# Patient Record
Sex: FEMALE | Race: WHITE
[De-identification: names, ages, dates, MRNs, and addresses within clinical notes are randomized per-mention and may not be internally consistent; named-entity substitution may affect disease eponyms.]

---

## 2017-02-14 ENCOUNTER — HOSPITAL ENCOUNTER (OUTPATIENT)
Dept: HOSPITAL 22 - LAB | Age: 72
End: 2017-02-14
Attending: INTERNAL MEDICINE
Payer: MEDICARE

## 2017-02-14 DIAGNOSIS — E78.5: ICD-10-CM

## 2017-02-14 DIAGNOSIS — I10: ICD-10-CM

## 2017-02-14 DIAGNOSIS — E87.1: Primary | ICD-10-CM

## 2017-02-14 LAB
BUN: 16 MG/DL (ref 7–18)
GFR SERPLBLD BASED ON 1.73 SQ M-ARVRAT: 71 ML/MIN (ref 59–?)

## 2017-10-21 ENCOUNTER — HOSPITAL ENCOUNTER (OUTPATIENT)
Dept: HOSPITAL 22 - LAB | Age: 72
End: 2017-10-21
Payer: MEDICARE

## 2017-10-21 DIAGNOSIS — E78.5: ICD-10-CM

## 2017-10-21 DIAGNOSIS — I10: Primary | ICD-10-CM

## 2017-10-21 DIAGNOSIS — E87.1: ICD-10-CM

## 2017-10-21 LAB
BUN: 20 MG/DL (ref 7–18)
GFR SERPLBLD BASED ON 1.73 SQ M-ARVRAT: 55 ML/MIN (ref 59–?)

## 2018-03-14 ENCOUNTER — HOSPITAL ENCOUNTER (OUTPATIENT)
Age: 73
End: 2018-03-14
Payer: MEDICARE

## 2018-03-14 DIAGNOSIS — R92.8: Primary | ICD-10-CM

## 2018-03-14 PROCEDURE — 77065 DX MAMMO INCL CAD UNI: CPT

## 2018-04-17 ENCOUNTER — HOSPITAL ENCOUNTER (OUTPATIENT)
Age: 73
End: 2018-04-17
Payer: MEDICARE

## 2018-04-17 DIAGNOSIS — M81.0: Primary | ICD-10-CM

## 2018-04-17 PROCEDURE — 77080 DXA BONE DENSITY AXIAL: CPT

## 2018-05-10 ENCOUNTER — HOSPITAL ENCOUNTER (OUTPATIENT)
Age: 73
End: 2018-05-10
Payer: MEDICARE

## 2018-05-10 DIAGNOSIS — K76.0: Primary | ICD-10-CM

## 2018-05-10 LAB
ANION GAP SERPL CALC-SCNC: 15.6 MEQ/L (ref 5–15)
BUN SERPL-MCNC: 29 MG/DL (ref 7–18)
CHLORIDE SPEC-SCNC: 103 MMOL/L (ref 98–107)
CHOLEST SPEC-SCNC: 165 MG/DL (ref 140–200)
CO2 SERPL-SCNC: 25 MMOL/L (ref 21–32)
CREAT BLD-SCNC: 1.42 MG/DL (ref 0.55–1.02)
ESTIMATED GLOMERULAR FILT RATE: 36 ML/MIN (ref 60–?)
GFR (AFRICAN AMERICAN): 44 ML/MIN (ref 60–?)
GLUCOSE: 96 MG/DL (ref 74–106)
HDLC SERPL-MCNC: 49 MG/DL (ref 29–89)
POTASSIUM: 4.6 MMOL/L (ref 3.5–5.1)
SODIUM SPEC-SCNC: 139 MMOL/L (ref 136–145)
TRIGLYCERIDES: 75 MG/DL (ref 30–200)

## 2018-05-10 PROCEDURE — 80048 BASIC METABOLIC PNL TOTAL CA: CPT

## 2018-05-10 PROCEDURE — 80061 LIPID PANEL: CPT

## 2018-05-10 PROCEDURE — 36415 COLL VENOUS BLD VENIPUNCTURE: CPT

## 2018-06-06 ENCOUNTER — HOSPITAL ENCOUNTER (OUTPATIENT)
Age: 73
End: 2018-06-06
Payer: MEDICARE

## 2018-06-06 DIAGNOSIS — R53.83: ICD-10-CM

## 2018-06-06 DIAGNOSIS — G47.33: Primary | ICD-10-CM

## 2018-06-06 DIAGNOSIS — R06.83: ICD-10-CM

## 2018-06-06 PROCEDURE — G0399 HOME SLEEP TEST/TYPE 3 PORTA: HCPCS

## 2018-07-21 ENCOUNTER — HOSPITAL ENCOUNTER (OUTPATIENT)
Dept: HOSPITAL 22 - LAB | Age: 73
End: 2018-07-21
Payer: MEDICARE

## 2018-07-21 DIAGNOSIS — R35.0: Primary | ICD-10-CM

## 2018-07-21 PROCEDURE — 87088 URINE BACTERIA CULTURE: CPT

## 2018-07-21 PROCEDURE — 87086 URINE CULTURE/COLONY COUNT: CPT

## 2018-07-21 PROCEDURE — 87186 SC STD MICRODIL/AGAR DIL: CPT

## 2018-08-24 ENCOUNTER — HOSPITAL ENCOUNTER (OUTPATIENT)
Age: 73
End: 2018-08-24
Payer: MEDICARE

## 2018-08-24 DIAGNOSIS — R92.8: Primary | ICD-10-CM

## 2018-08-24 PROCEDURE — 77066 DX MAMMO INCL CAD BI: CPT

## 2019-05-03 ENCOUNTER — HOSPITAL ENCOUNTER (OUTPATIENT)
Age: 74
End: 2019-05-03
Payer: MEDICARE

## 2019-05-03 DIAGNOSIS — Z13.820: Primary | ICD-10-CM

## 2019-05-03 DIAGNOSIS — M81.0: ICD-10-CM

## 2019-05-03 PROCEDURE — 77080 DXA BONE DENSITY AXIAL: CPT

## 2019-09-25 ENCOUNTER — HOSPITAL ENCOUNTER (OUTPATIENT)
Age: 74
End: 2019-09-25
Payer: MEDICARE

## 2019-09-25 DIAGNOSIS — Z12.31: Primary | ICD-10-CM

## 2019-09-25 PROCEDURE — 77067 SCR MAMMO BI INCL CAD: CPT

## 2019-11-08 ENCOUNTER — HOSPITAL ENCOUNTER (OUTPATIENT)
Age: 74
End: 2019-11-08
Payer: MEDICARE

## 2019-11-08 DIAGNOSIS — K74.3: Primary | ICD-10-CM

## 2019-11-08 PROCEDURE — 76705 ECHO EXAM OF ABDOMEN: CPT

## 2019-12-02 ENCOUNTER — HOSPITAL ENCOUNTER (OUTPATIENT)
Age: 74
End: 2019-12-02
Payer: MEDICARE

## 2019-12-02 DIAGNOSIS — D50.9: Primary | ICD-10-CM

## 2019-12-02 PROCEDURE — 74250 X-RAY XM SM INT 1CNTRST STD: CPT

## 2020-02-07 ENCOUNTER — HOSPITAL ENCOUNTER (OUTPATIENT)
Age: 75
End: 2020-02-07
Payer: MEDICARE

## 2020-02-07 DIAGNOSIS — D50.9: Primary | ICD-10-CM

## 2020-02-07 LAB
ALBUMIN LEVEL: 3.7 GM/DL (ref 3.4–5)
ALBUMIN/GLOB SERPL: 1 {RATIO} (ref 1.1–1.8)
ALP ISO SERPL-ACNC: 103 U/L (ref 46–116)
ALT SERPLBLD-CCNC: 31 U/L (ref 12–78)
ANION GAP SERPL CALC-SCNC: 11.1 MEQ/L (ref 5–15)
AST SERPL QL: 22 U/L (ref 15–37)
BILIRUBIN,TOTAL: 0.7 MG/DL (ref 0.2–1)
BUN SERPL-MCNC: 15 MG/DL (ref 7–18)
CALCIUM SPEC-MCNC: 9 MG/DL (ref 8.5–10.1)
CHLORIDE SPEC-SCNC: 99 MMOL/L (ref 98–107)
CO2 SERPL-SCNC: 29 MMOL/L (ref 21–32)
CREAT BLD-SCNC: 1.02 MG/DL (ref 0.55–1.02)
ESTIMATED GLOMERULAR FILT RATE: 53 ML/MIN (ref 60–?)
FERRITIN SERPL-MCNC: 16 NG/ML (ref 8–388)
GFR (AFRICAN AMERICAN): 64 ML/MIN (ref 60–?)
GLOBULIN SER CALC-MCNC: 3.8 GM/DL (ref 1.3–3.2)
GLUCOSE: 98 MG/DL (ref 74–106)
HCT VFR BLD CALC: 30.4 % (ref 37–47)
HGB BLD-MCNC: 9.4 G/DL (ref 12.2–16.2)
MCHC RBC-ENTMCNC: 30.9 G/DL (ref 31.8–35.4)
MCV RBC: 73.9 FL (ref 81–99)
MEAN CORPUSCULAR HEMOGLOBIN: 22.8 PG (ref 27–31.2)
PLATELET # BLD: 278 K/MM3 (ref 142–424)
POTASSIUM: 4.1 MMOL/L (ref 3.5–5.1)
PROT SERPL-MCNC: 7.5 GM/DL (ref 6.4–8.2)
RBC # BLD AUTO: 4.11 M/MM3 (ref 4.2–5.4)
RETICS/RBC NFR AUTO: 1.4 % (ref 0.9–3.2)
SODIUM SPEC-SCNC: 135 MMOL/L (ref 136–145)
WBC # BLD AUTO: 6.9 K/MM3 (ref 4.8–10.8)

## 2020-02-07 PROCEDURE — 80053 COMPREHEN METABOLIC PANEL: CPT

## 2020-02-07 PROCEDURE — 83540 ASSAY OF IRON: CPT

## 2020-02-07 PROCEDURE — 82272 OCCULT BLD FECES 1-3 TESTS: CPT

## 2020-02-07 PROCEDURE — 36415 COLL VENOUS BLD VENIPUNCTURE: CPT

## 2020-02-07 PROCEDURE — 82607 VITAMIN B-12: CPT

## 2020-02-07 PROCEDURE — 82746 ASSAY OF FOLIC ACID SERUM: CPT

## 2020-02-07 PROCEDURE — 82728 ASSAY OF FERRITIN: CPT

## 2020-02-07 PROCEDURE — 85044 MANUAL RETICULOCYTE COUNT: CPT

## 2020-02-07 PROCEDURE — G0328 FECAL BLOOD SCRN IMMUNOASSAY: HCPCS

## 2020-02-07 PROCEDURE — 85025 COMPLETE CBC W/AUTO DIFF WBC: CPT

## 2020-02-07 PROCEDURE — 83550 IRON BINDING TEST: CPT

## 2020-02-08 LAB
FOLATE: 15.5 NG/ML (ref 3–?)
IRON: 41 UG/DL (ref 27–139)
UIBC: 380 UG/DL (ref 118–369)
VITAMIN B12: 605 PG/ML (ref 232–1245)

## 2020-03-05 ENCOUNTER — HOSPITAL ENCOUNTER (OUTPATIENT)
Age: 75
End: 2020-03-05
Payer: MEDICARE

## 2020-03-05 DIAGNOSIS — D50.9: Primary | ICD-10-CM

## 2020-03-05 LAB
HCT VFR BLD CALC: 32.1 % (ref 37–47)
HGB BLD-MCNC: 10.6 G/DL (ref 12.2–16.2)
MCHC RBC-ENTMCNC: 33 G/DL (ref 31.8–35.4)
MCV RBC: 77.7 FL (ref 81–99)
MEAN CORPUSCULAR HEMOGLOBIN: 25.6 PG (ref 27–31.2)
PLATELET # BLD: 154 K/MM3 (ref 142–424)
RBC # BLD AUTO: 4.14 M/MM3 (ref 4.2–5.4)
WBC # BLD AUTO: 6 K/MM3 (ref 4.8–10.8)

## 2020-03-05 PROCEDURE — 83615 LACTATE (LD) (LDH) ENZYME: CPT

## 2020-03-05 PROCEDURE — 36415 COLL VENOUS BLD VENIPUNCTURE: CPT

## 2020-03-05 PROCEDURE — 85025 COMPLETE CBC W/AUTO DIFF WBC: CPT

## 2020-03-05 PROCEDURE — 83010 ASSAY OF HAPTOGLOBIN QUANT: CPT

## 2020-06-11 ENCOUNTER — HOSPITAL ENCOUNTER (OUTPATIENT)
Age: 75
End: 2020-06-11
Payer: MEDICARE

## 2020-06-11 DIAGNOSIS — D50.9: Primary | ICD-10-CM

## 2020-06-11 LAB
FERRITIN SERPL-MCNC: 50.4 NG/ML (ref 11.1–264)
HCT VFR BLD CALC: 35.7 % (ref 37–47)
HGB BLD-MCNC: 11.8 G/DL (ref 12.2–16.2)
IRON SERPL QL: 91 UG/DL (ref 37–170)
MCHC RBC-ENTMCNC: 33 G/DL (ref 31.8–35.4)
MCV RBC: 88 FL (ref 81–99)
MEAN CORPUSCULAR HEMOGLOBIN: 29.1 PG (ref 27–31.2)
PLATELET # BLD: 152 K/MM3 (ref 142–424)
RBC # BLD AUTO: 4.06 M/MM3 (ref 4.2–5.4)
TOTAL IRON BINDING CAPACITY: 339 UG/DL (ref 265–497)
WBC # BLD AUTO: 6.5 K/MM3 (ref 4.8–10.8)

## 2020-06-11 PROCEDURE — 83540 ASSAY OF IRON: CPT

## 2020-06-11 PROCEDURE — 82728 ASSAY OF FERRITIN: CPT

## 2020-06-11 PROCEDURE — 83550 IRON BINDING TEST: CPT

## 2020-06-11 PROCEDURE — 85025 COMPLETE CBC W/AUTO DIFF WBC: CPT

## 2020-06-11 PROCEDURE — 36415 COLL VENOUS BLD VENIPUNCTURE: CPT

## 2020-09-28 ENCOUNTER — HOSPITAL ENCOUNTER (OUTPATIENT)
Age: 75
End: 2020-09-28
Payer: MEDICARE

## 2020-09-28 DIAGNOSIS — Z12.31: Primary | ICD-10-CM

## 2020-09-28 PROCEDURE — 77067 SCR MAMMO BI INCL CAD: CPT

## 2020-09-28 PROCEDURE — 77063 BREAST TOMOSYNTHESIS BI: CPT

## 2021-01-15 ENCOUNTER — HOSPITAL ENCOUNTER (OUTPATIENT)
Age: 76
End: 2021-01-15
Payer: MEDICARE

## 2021-01-15 DIAGNOSIS — D50.9: Primary | ICD-10-CM

## 2021-01-15 LAB
FERRITIN SERPL-MCNC: 71.8 NG/ML (ref 11.1–264)
HCT VFR BLD CALC: 38.6 % (ref 37–47)
HGB BLD-MCNC: 13.2 G/DL (ref 12.2–16.2)
IRON SERPL QL: 107 UG/DL (ref 37–170)
MCHC RBC-ENTMCNC: 34.2 G/DL (ref 31.8–35.4)
MCV RBC: 90 FL (ref 81–99)
MEAN CORPUSCULAR HEMOGLOBIN: 30.8 PG (ref 27–31.2)
PLATELET # BLD: 153 K/MM3 (ref 142–424)
RBC # BLD AUTO: 4.28 M/MM3 (ref 4.2–5.4)
TOTAL IRON BINDING CAPACITY: 320 UG/DL (ref 265–497)
WBC # BLD AUTO: 7 K/MM3 (ref 4.8–10.8)

## 2021-01-15 PROCEDURE — 82728 ASSAY OF FERRITIN: CPT

## 2021-01-15 PROCEDURE — 83540 ASSAY OF IRON: CPT

## 2021-01-15 PROCEDURE — 85025 COMPLETE CBC W/AUTO DIFF WBC: CPT

## 2021-01-15 PROCEDURE — 83550 IRON BINDING TEST: CPT

## 2021-01-15 PROCEDURE — 36415 COLL VENOUS BLD VENIPUNCTURE: CPT

## 2021-03-02 ENCOUNTER — HOSPITAL ENCOUNTER (OUTPATIENT)
Age: 76
End: 2021-03-02
Payer: MEDICARE

## 2021-03-02 DIAGNOSIS — Z51.81: ICD-10-CM

## 2021-03-02 DIAGNOSIS — Z01.818: ICD-10-CM

## 2021-03-02 DIAGNOSIS — E78.5: ICD-10-CM

## 2021-03-02 DIAGNOSIS — M25.561: Primary | ICD-10-CM

## 2021-03-02 LAB
ALBUMIN LEVEL: 4.2 G/DL (ref 3.5–5)
ALBUMIN/GLOB SERPL: 1.2 {RATIO} (ref 1.1–1.8)
ALP ISO SERPL-ACNC: 64 U/L (ref 38–126)
ALT SERPLBLD-CCNC: 16 U/L (ref 12–78)
ANION GAP SERPL CALC-SCNC: 11.3 MEQ/L (ref 5–15)
AST SERPL QL: 27 U/L (ref 14–36)
BILIRUBIN,TOTAL: 0.8 MG/DL (ref 0.2–1.3)
BUN SERPL-MCNC: 19 MG/DL (ref 7–17)
CALCIUM SPEC-MCNC: 9.7 MG/DL (ref 8.4–10.2)
CHLORIDE SPEC-SCNC: 100 MMOL/L (ref 98–107)
CHOLEST SPEC-SCNC: 135 MG/DL (ref 140–200)
CO2 SERPL-SCNC: 28 MMOL/L (ref 22–30)
CREAT BLD-SCNC: 1 MG/DL (ref 0.52–1.04)
ESTIMATED GLOMERULAR FILT RATE: 54 ML/MIN (ref 60–?)
GFR (AFRICAN AMERICAN): 66 ML/MIN (ref 60–?)
GLOBULIN SER CALC-MCNC: 3.6 G/DL (ref 1.3–3.2)
GLUCOSE: 143 MG/DL (ref 74–100)
HDLC SERPL-MCNC: 49 MG/DL (ref 40–60)
INR PPP: 0.95 (ref 0.9–1.1)
POTASSIUM: 4.3 MMOL/L (ref 3.5–5.1)
PROT SERPL-MCNC: 7.8 G/DL (ref 6.3–8.2)
PT BLD: 11.3 SECONDS (ref 9.4–11.8)
SODIUM SPEC-SCNC: 135 MMOL/L (ref 136–145)
TRIGLYCERIDES: 90 MG/DL (ref 30–150)

## 2021-03-02 PROCEDURE — 80061 LIPID PANEL: CPT

## 2021-03-02 PROCEDURE — 36415 COLL VENOUS BLD VENIPUNCTURE: CPT

## 2021-03-02 PROCEDURE — 85610 PROTHROMBIN TIME: CPT

## 2021-03-02 PROCEDURE — 80053 COMPREHEN METABOLIC PANEL: CPT

## 2021-03-02 PROCEDURE — 73564 X-RAY EXAM KNEE 4 OR MORE: CPT

## 2021-07-08 ENCOUNTER — HOSPITAL ENCOUNTER (OUTPATIENT)
Age: 76
End: 2021-07-08
Payer: MEDICARE

## 2021-07-08 DIAGNOSIS — D50.9: Primary | ICD-10-CM

## 2021-07-08 LAB
FERRITIN SERPL-MCNC: 134 NG/ML (ref 11.1–264)
HCT VFR BLD CALC: 35.9 % (ref 37–47)
HGB BLD-MCNC: 12.4 G/DL (ref 12.2–16.2)
IRON SERPL QL: 68 UG/DL (ref 37–170)
MCHC RBC-ENTMCNC: 34.5 G/DL (ref 31.8–35.4)
MCV RBC: 86.2 FL (ref 81–99)
MEAN CORPUSCULAR HEMOGLOBIN: 29.8 PG (ref 27–31.2)
PLATELET # BLD: 199 K/MM3 (ref 142–424)
RBC # BLD AUTO: 4.17 M/MM3 (ref 4.2–5.4)
TOTAL IRON BINDING CAPACITY: 289 UG/DL (ref 265–497)
WBC # BLD AUTO: 7.6 K/MM3 (ref 4.8–10.8)

## 2021-07-08 PROCEDURE — 83550 IRON BINDING TEST: CPT

## 2021-07-08 PROCEDURE — 36415 COLL VENOUS BLD VENIPUNCTURE: CPT

## 2021-07-08 PROCEDURE — 82728 ASSAY OF FERRITIN: CPT

## 2021-07-08 PROCEDURE — 85025 COMPLETE CBC W/AUTO DIFF WBC: CPT

## 2021-07-08 PROCEDURE — 83540 ASSAY OF IRON: CPT

## 2021-09-29 ENCOUNTER — HOSPITAL ENCOUNTER (OUTPATIENT)
Age: 76
End: 2021-09-29
Payer: MEDICARE

## 2021-09-29 DIAGNOSIS — Z12.31: Primary | ICD-10-CM

## 2021-09-29 PROCEDURE — 77067 SCR MAMMO BI INCL CAD: CPT

## 2021-09-29 PROCEDURE — 77063 BREAST TOMOSYNTHESIS BI: CPT

## 2021-10-20 ENCOUNTER — HOSPITAL ENCOUNTER (OUTPATIENT)
Age: 76
End: 2021-10-20
Payer: MEDICARE

## 2021-10-20 DIAGNOSIS — R92.8: Primary | ICD-10-CM

## 2021-10-20 PROCEDURE — 77061 BREAST TOMOSYNTHESIS UNI: CPT

## 2021-10-20 PROCEDURE — 77065 DX MAMMO INCL CAD UNI: CPT

## 2021-10-20 PROCEDURE — 76641 ULTRASOUND BREAST COMPLETE: CPT

## 2021-10-20 PROCEDURE — G0279 TOMOSYNTHESIS, MAMMO: HCPCS

## 2021-10-28 ENCOUNTER — HOSPITAL ENCOUNTER (OUTPATIENT)
Age: 76
End: 2021-10-28
Payer: MEDICARE

## 2021-10-28 DIAGNOSIS — K74.3: Primary | ICD-10-CM

## 2021-10-28 LAB
ALBUMIN LEVEL: 4.2 G/DL (ref 3.5–5)
ALBUMIN/GLOB SERPL: 1.3 {RATIO} (ref 1.1–1.8)
ALP ISO SERPL-ACNC: 62 U/L (ref 38–126)
ALT SERPLBLD-CCNC: 21 U/L (ref 12–78)
ANION GAP SERPL CALC-SCNC: 13.4 MEQ/L (ref 5–15)
AST SERPL QL: 27 U/L (ref 14–36)
BILIRUBIN,TOTAL: 0.8 MG/DL (ref 0.2–1.3)
BUN SERPL-MCNC: 13 MG/DL (ref 7–17)
CALCIUM SPEC-MCNC: 9.2 MG/DL (ref 8.4–10.2)
CHLORIDE SPEC-SCNC: 97 MMOL/L (ref 98–107)
CO2 SERPL-SCNC: 27 MMOL/L (ref 22–30)
CREAT BLD-SCNC: 0.6 MG/DL (ref 0.52–1.04)
ESTIMATED GLOMERULAR FILT RATE: 97 ML/MIN (ref 60–?)
GFR (AFRICAN AMERICAN): 118 ML/MIN (ref 60–?)
GLOBULIN SER CALC-MCNC: 3.3 G/DL (ref 1.3–3.2)
GLUCOSE: 109 MG/DL (ref 74–100)
HCT VFR BLD CALC: 41 % (ref 37–47)
HGB BLD-MCNC: 13.8 G/DL (ref 12.2–16.2)
INR PPP: 0.95 (ref 0.9–1.1)
MCHC RBC-ENTMCNC: 33.6 G/DL (ref 31.8–35.4)
MCV RBC: 92.3 FL (ref 81–99)
MEAN CORPUSCULAR HEMOGLOBIN: 31 PG (ref 27–31.2)
PLATELET # BLD: 164 K/MM3 (ref 142–424)
POTASSIUM: 4.4 MMOL/L (ref 3.5–5.1)
PROT SERPL-MCNC: 7.5 G/DL (ref 6.3–8.2)
PT BLD: 10.8 SECONDS (ref 10.1–12.5)
RBC # BLD AUTO: 4.45 M/MM3 (ref 4.2–5.4)
SODIUM SPEC-SCNC: 133 MMOL/L (ref 136–145)
WBC # BLD AUTO: 6.6 K/MM3 (ref 4.8–10.8)

## 2021-10-28 PROCEDURE — 85025 COMPLETE CBC W/AUTO DIFF WBC: CPT

## 2021-10-28 PROCEDURE — 80053 COMPREHEN METABOLIC PANEL: CPT

## 2021-10-28 PROCEDURE — 36415 COLL VENOUS BLD VENIPUNCTURE: CPT

## 2021-10-28 PROCEDURE — 85610 PROTHROMBIN TIME: CPT

## 2021-11-18 ENCOUNTER — HOSPITAL ENCOUNTER (OUTPATIENT)
Age: 76
End: 2021-11-18
Payer: MEDICARE

## 2021-11-18 DIAGNOSIS — R92.8: Primary | ICD-10-CM

## 2021-11-18 PROCEDURE — 88305 TISSUE EXAM BY PATHOLOGIST: CPT

## 2021-11-18 PROCEDURE — 19083 BX BREAST 1ST LESION US IMAG: CPT

## 2021-11-18 PROCEDURE — C2618 PROBE/NEEDLE, CRYO: HCPCS

## 2021-11-18 PROCEDURE — 88360 TUMOR IMMUNOHISTOCHEM/MANUAL: CPT

## 2021-11-18 PROCEDURE — 77065 DX MAMMO INCL CAD UNI: CPT

## 2021-11-18 PROCEDURE — 10006 FNA BX W/US GDN EA ADDL: CPT

## 2021-12-20 ENCOUNTER — NURSE NAVIGATOR (OUTPATIENT)
Dept: OTHER | Facility: HOSPITAL | Age: 76
End: 2021-12-20

## 2021-12-20 DIAGNOSIS — C50.412 MALIGNANT NEOPLASM OF UPPER-OUTER QUADRANT OF LEFT BREAST IN FEMALE, ESTROGEN RECEPTOR POSITIVE (HCC): Primary | ICD-10-CM

## 2021-12-20 DIAGNOSIS — Z17.0 MALIGNANT NEOPLASM OF UPPER-OUTER QUADRANT OF LEFT BREAST IN FEMALE, ESTROGEN RECEPTOR POSITIVE (HCC): Primary | ICD-10-CM

## 2021-12-20 NOTE — PROGRESS NOTES
I saw patient with Dr Reed and patients . Dr Reed reviewed the pathology report and surgical/treatment options. The patient has personal history of ovarian cancer- a genetic referral has been made- Dr Reed will order MRI - the patient prefers BCT - Radiation may not be needed - A SLN will not be done- Dr Reed discussed with patient and she agreed- Educational and Supportive materials were given and reviewed - notes taken for patient

## 2021-12-23 ENCOUNTER — LAB REQUISITION (OUTPATIENT)
Dept: LAB | Facility: HOSPITAL | Age: 76
End: 2021-12-23

## 2021-12-23 DIAGNOSIS — C50.412 MALIGNANT NEOPLASM OF UPPER-OUTER QUADRANT OF LEFT FEMALE BREAST: ICD-10-CM

## 2021-12-23 LAB
CYTO UR: NORMAL
LAB AP CASE REPORT: NORMAL
LAB AP CLINICAL INFORMATION: NORMAL
LAB AP DIAGNOSIS COMMENT: NORMAL
PATH REPORT.FINAL DX SPEC: NORMAL
PATH REPORT.GROSS SPEC: NORMAL

## 2021-12-27 ENCOUNTER — TELEPHONE (OUTPATIENT)
Dept: GENETICS | Facility: HOSPITAL | Age: 76
End: 2021-12-27

## 2021-12-30 ENCOUNTER — HOSPITAL ENCOUNTER (OUTPATIENT)
Dept: MRI IMAGING | Facility: HOSPITAL | Age: 76
Discharge: HOME | End: 2021-12-30

## 2021-12-30 ENCOUNTER — CLINICAL SUPPORT (OUTPATIENT)
Dept: GENETICS | Facility: HOSPITAL | Age: 76
End: 2021-12-30

## 2021-12-30 ENCOUNTER — LAB (OUTPATIENT)
Dept: LAB | Facility: HOSPITAL | Age: 76
End: 2021-12-30

## 2021-12-30 DIAGNOSIS — Z17.0 MALIGNANT NEOPLASM OF BREAST IN FEMALE, ESTROGEN RECEPTOR POSITIVE, UNSPECIFIED LATERALITY, UNSPECIFIED SITE OF BREAST (HCC): Primary | ICD-10-CM

## 2021-12-30 DIAGNOSIS — C50.212 MALIGNANT NEOPLASM OF UPPER-INNER QUADRANT OF LEFT FEMALE BREAST (HCC): ICD-10-CM

## 2021-12-30 DIAGNOSIS — C50.919 MALIGNANT NEOPLASM OF BREAST IN FEMALE, ESTROGEN RECEPTOR POSITIVE, UNSPECIFIED LATERALITY, UNSPECIFIED SITE OF BREAST (HCC): Primary | ICD-10-CM

## 2021-12-30 LAB — CREAT BLDA-MCNC: 0.8 MG/DL (ref 0.6–1.3)

## 2021-12-30 PROCEDURE — 0 GADOBENATE DIMEGLUMINE 529 MG/ML SOLUTION: Performed by: SURGERY

## 2021-12-30 PROCEDURE — C8908 MRI W/O FOL W/CONT, BREAST,: HCPCS

## 2021-12-30 PROCEDURE — A9577 INJ MULTIHANCE: HCPCS | Performed by: SURGERY

## 2021-12-30 PROCEDURE — C8937 CAD BREAST MRI: HCPCS

## 2021-12-30 PROCEDURE — 77049 MRI BREAST C-+ W/CAD BI: CPT | Performed by: RADIOLOGY

## 2021-12-30 PROCEDURE — 82565 ASSAY OF CREATININE: CPT

## 2021-12-30 RX ADMIN — GADOBENATE DIMEGLUMINE 14 ML: 529 INJECTION, SOLUTION INTRAVENOUS at 10:13

## 2021-12-30 NOTE — PROGRESS NOTES
Kenisha Jama is a 76-year-old female who was seen for genetic counseling due to a personal and family history of breast cancer.   Ms. 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  Ms. Jama reports a personal history of ovarian cancer at age 66, which she reports was treated surgically with no further treatment.  Ms. Jama was interested in discussing her risk for a hereditary cancer syndrome, and decided to pursue genetic testing.   Ms. Jama opted to pursue comprehensive testing via the CancerNext panel ordered through [facility] which includes BRCA1/2 and 34 additional genes associated with an increased risk of breast cancer or other cancers (APC, JOBY, AXIN2, BARD1, BMPR1A, BRCA1, BRCA2, BRIP1, CDH1, CDK4, CDKN2A, CHEK2, DICER1, EPCAM, GREM1, HOXB13, MLH1, MRE11A, MSH2, MSH3, MSH6, MUTYH, NBN, NF1, NTHL1, PALB2, PMS2, POLD1, POLE, PTEN, RAD51C, RAD51D, RECQL, SMAD4, SMARCA4, STK11, and TP53).  Results from the high/moderate risk breast cancer genes are expected in 7-10 days, and results from the remainder of the panel are expected in 2-3 weeks.     PERTINENT FAMILY HISTORY:  Father:  Colon cancer, 56    RISK ASSESSMENT:  Ms. Jama’s personal history of breast cancer in combination with her reported personal history ovarian cancer raises the question of a hereditary cancer syndrome.   NCCN guidelines recommend BRCA1/2 testing for individuals diagnosed with ovarian cancer at any age.  Therefore, testing is appropriate for Ms. Jama.  We discussed that standard approach to BRCA1/2 testing is via a multigene panel that evaluates BRCA1/2 and multiple other genes known to impact cancer risk.  This risk assessment is based on the family history information provided at the time of the appointment.  The assessment could change in the future should new information be obtained.     GENETIC COUNSELING:  We reviewed the family history information  in detail.  Cases of breast cancer follow three general patterns: sporadic, familial, and hereditary.  While most cancer is sporadic, some cases appear to occur in family clusters.  These cases are said to be familial and account for 10-20% of breast cancer cases.  Familial cases may be due to a combination of shared genes and environmental factors among family members.  In even fewer cases (5-10%), the risk for cancer is inherited, and the genes responsible for the increased cancer risk are known.       Family histories typical of hereditary cancer syndromes usually include multiple first- and second-degree relatives diagnosed with cancer types that define a syndrome.  These cases tend to be diagnosed at younger-than-expected ages and can be bilateral or multifocal.  The cancer in these families follows an autosomal dominant inheritance pattern, which indicates the likely presence of a mutation in a cancer susceptibility gene.  Children and siblings of an individual believed to carry this mutation have a 50% chance of inheriting that mutation, thereby inheriting the increased risk to develop cancer.  These mutations can be passed down from the maternal or the paternal lineage.     Hereditary breast cancer accounts for 5-10% of all cases of breast cancer.  A significant proportion of hereditary breast cancer can be attributed to mutations in the BRCA1 and BRCA2 genes.  Mutations in these genes confer an increased risk for breast cancer, ovarian cancer, male breast cancer, prostate cancer and pancreatic cancer.  There are other genes that are known to be associated with an increased risk for breast cancer and other cancers. In order to get as much information as possible regarding Ms. Jama’s personal risks and potential risks for her family, testing was pursued through a multigene panel that would look at several other genes known to increase the risk for breast cancer and other cancers.     GENETIC TESTING:  The  risks, benefits and limitations of genetic testing and implications for clinical management following testing were reviewed.  DNA test results can influence decisions regarding screening, prevention and surgical management.  Genetic testing can have significant psychological implications for both individuals and families.  Also discussed was the possibility of insurance discrimination based on genetic test results and the laws ([place]) in place to prevent this.     We discussed panel testing, which would involve testing for BRCA1/2 as well as several other cancer susceptibility genes at the same time.  The benefits and limitations of genetic testing were discussed and Ms. Jama decided to pursue testing. The implications of a positive or negative test result were discussed. We discussed the possibility that, in some cases, genetic test results may be uninformative due to the identification of a genetic variant of uncertain significance. These variants may or may not be associated with an increased cancer risk.  VUSs are frequently reported through multigene panel testing, given the presence of genetic variation in the population and the number of genes being analyzed. Given her personal history, a negative test result would not eliminate all breast cancer risk to her relatives, although the risk would not be as high as it would with positive genetic testing.          PLAN:  The patient will be contacted by telephone once results are available.  Genetic counseling remains available to Ms. Jama, and she is welcome to contact us at [phone] with any questions she may have.        Cassandra Reyes MS, [facility] [place], [facility]  Licensed Certified Genetic Counselor

## 2022-01-06 ENCOUNTER — TRANSCRIBE ORDERS (OUTPATIENT)
Dept: ADMINISTRATIVE | Facility: HOSPITAL | Age: 77
End: 2022-01-06

## 2022-01-06 DIAGNOSIS — M89.8X8 MASS OF STERNUM: Primary | ICD-10-CM

## 2022-01-10 ENCOUNTER — HOSPITAL ENCOUNTER (OUTPATIENT)
Dept: CT IMAGING | Facility: HOSPITAL | Age: 77
Discharge: HOME | End: 2022-01-10
Admitting: SURGERY

## 2022-01-10 DIAGNOSIS — M89.8X8 MASS OF STERNUM: ICD-10-CM

## 2022-01-10 LAB — CREAT BLDA-MCNC: 1 MG/DL (ref 0.6–1.3)

## 2022-01-10 PROCEDURE — 82565 ASSAY OF CREATININE: CPT

## 2022-01-10 PROCEDURE — 25010000002 IOPAMIDOL 61 % SOLUTION: Performed by: SURGERY

## 2022-01-10 PROCEDURE — 71260 CT THORAX DX C+: CPT

## 2022-01-10 RX ADMIN — IOPAMIDOL 85 ML: 612 INJECTION, SOLUTION INTRAVENOUS at 15:11

## 2022-01-11 ENCOUNTER — DOCUMENTATION (OUTPATIENT)
Dept: GENETICS | Facility: HOSPITAL | Age: 77
End: 2022-01-11

## 2022-01-11 NOTE — PROGRESS NOTES
Kenisha Jama is a 76-year-old female who was seen for genetic counseling due to a personal and family history of breast cancer.   Ms. 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  Ms. Jama originally reported personal history of ovarian cancer at age 66, which she reports was treated surgically with no further treatment.  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  Ms. Jama was interested in discussing her risk for a hereditary cancer syndrome, and decided to pursue genetic testing.   Ms. Jama opted to pursue comprehensive testing via the CancerNext panel ordered through [facility] which includes BRCA1/2 and 34 additional genes associated with an increased risk of breast cancer or other cancers (APC, JOBY, AXIN2, BARD1, BMPR1A, BRCA1, BRCA2, BRIP1, CDH1, CDK4, CDKN2A, CHEK2, DICER1, EPCAM, GREM1, HOXB13, MLH1, MRE11A, MSH2, MSH3, MSH6, MUTYH, NBN, NF1, NTHL1, PALB2, PMS2, POLD1, POLE, PTEN, RAD51C, RAD51D, RECQL, SMAD4, SMARCA4, STK11, and TP53).  Genetic testing was negative for known deleterious/pathogenic mutations in the 36 genes included on this panel.  While no known deleterious mutations were identified, a variant of uncertain significance (VUS) was identified in the JOBY gene. VUSs are differences in DNA that may or may not affect the function of the gene.  VUSs are frequently reported through multigene panel testing, given the number of genes being evaluated and the presence of genetic variation in the population.  The majority (estimated to be >90%) of VUS’s are eventually reclassified as benign gene variation. It is not recommended that any unaffected relatives be tested for a VUS since this is not a clinically actionable finding, and this does not impact management for Ms. Jama in any way.  These results were discussed with Ms.  Wiley by telephone on 1/11/22.     PERTINENT FAMILY HISTORY:  Father:  Colon cancer, 56    RISK ASSESSMENT:  Ms. Jama’s personal history of breast cancer in combination with the originally reported personal history ovarian cancer (now clarified to be an endometrial cancer) raised the question of a hereditary cancer syndrome.    We discussed that standard approach to BRCA1/2 testing is via a multigene panel that evaluates BRCA1/2 and multiple other genes known to impact cancer risk.  This risk assessment is based on the family history information provided at the time of the appointment.  The assessment could change in the future should new information be obtained.     GENETIC COUNSELING:  We reviewed the family history information in detail.  Cases of breast cancer follow three general patterns: sporadic, familial, and hereditary.  While most cancer is sporadic, some cases appear to occur in family clusters.  These cases are said to be familial and account for 10-20% of breast cancer cases.  Familial cases may be due to a combination of shared genes and environmental factors among family members.  In even fewer cases (5-10%), the risk for cancer is inherited, and the genes responsible for the increased cancer risk are known.       Family histories typical of hereditary cancer syndromes usually include multiple first- and second-degree relatives diagnosed with cancer types that define a syndrome.  These cases tend to be diagnosed at younger-than-expected ages and can be bilateral or multifocal.  The cancer in these families follows an autosomal dominant inheritance pattern, which indicates the likely presence of a mutation in a cancer susceptibility gene.  Children and siblings of an individual believed to carry this mutation have a 50% chance of inheriting that mutation, thereby inheriting the increased risk to develop cancer.  These mutations can be passed down from the maternal or the paternal lineage.      Hereditary breast cancer accounts for 5-10% of all cases of breast cancer.  A significant proportion of hereditary breast cancer can be attributed to mutations in the BRCA1 and BRCA2 genes.  Mutations in these genes confer an increased risk for breast cancer, ovarian cancer, male breast cancer, prostate cancer and pancreatic cancer.  There are other genes that are known to be associated with an increased risk for breast cancer and other cancers. In order to get as much information as possible regarding Ms. Jama’s personal risks and potential risks for her family, testing was pursued through a multigene panel that would look at several other genes known to increase the risk for breast cancer and other cancers.     GENETIC TESTING:  The risks, benefits and limitations of genetic testing and implications for clinical management following testing were reviewed.  DNA test results can influence decisions regarding screening, prevention and surgical management.  Genetic testing can have significant psychological implications for both individuals and families.  Also discussed was the possibility of insurance discrimination based on genetic test results and the laws ([place]) in place to prevent this.     We discussed panel testing, which would involve testing for BRCA1/2 as well as several other cancer susceptibility genes at the same time.  The benefits and limitations of genetic testing were discussed and Ms. Jama decided to pursue testing. The implications of a positive or negative test result were discussed. We discussed the possibility that, in some cases, genetic test results may be uninformative due to the identification of a genetic variant of uncertain significance. These variants may or may not be associated with an increased cancer risk.  VUSs are frequently reported through multigene panel testing, given the presence of genetic variation in the population and the number of genes being analyzed. Given her  personal history, a negative test result would not eliminate all breast cancer risk to her relatives, although the risk would not be as high as it would with positive genetic testing.       TEST RESULTS:  Genetic testing was negative for known deleterious mutations by sequencing and rearrangement testing for the 36 genes on the CancerNext panel.    One VUS was identified in the JOBY gene. VUSs are not clinically actionable findings, and therefore this result does not impact management in any way.  The vast majority of VUSs are ultimately reclassified to benign variation.  The identification of a VUS is common in multigene panel testing, given the number of genes being evaluated and the presence of genetic variation in the population.  If this variant is ever reclassified, a new report will be issued by the laboratory and released directly to the ordering physician, and our office.  This assessment is based on the information provided at the time of the consultation.     PLAN: Genetic counseling remains available to Ms. Jama in the future. We encourage Ms. Jama to contact us at [phone] with any questions she may have.       Cassandra Reyes MS, [facility] [place], [facility]  Licensed Certified Genetic Counselor    Cc: Kenisha Reed MD

## 2022-01-18 ENCOUNTER — NURSE NAVIGATOR (OUTPATIENT)
Dept: OTHER | Facility: HOSPITAL | Age: 77
End: 2022-01-18

## 2022-01-18 NOTE — PROGRESS NOTES
Patient called to say that she was ready to schedule surgery - She had genetic testing, which was negative, MRI and CT of the chest - the patient wants to talk to Dr. Reed - I have notified Ammy Cuevas @ [facility].

## 2022-01-19 ENCOUNTER — TELEPHONE (OUTPATIENT)
Dept: MRI IMAGING | Facility: HOSPITAL | Age: 77
End: 2022-01-19

## 2022-01-19 NOTE — TELEPHONE ENCOUNTER
Called patient with date/time of recommended further workup from her MRI. Procedures are scheduled for Monday Jan. 24th at 8:15 at the [address]. Pt not on BT. Pt expressed understanding and was encouraged to call with any further questions or concerns.

## 2022-01-24 ENCOUNTER — HOSPITAL ENCOUNTER (OUTPATIENT)
Dept: MAMMOGRAPHY | Facility: HOSPITAL | Age: 77
Discharge: HOME | End: 2022-01-24

## 2022-01-24 ENCOUNTER — HOSPITAL ENCOUNTER (OUTPATIENT)
Dept: ULTRASOUND IMAGING | Facility: HOSPITAL | Age: 77
Discharge: HOME | End: 2022-01-24

## 2022-01-24 ENCOUNTER — TRANSCRIBE ORDERS (OUTPATIENT)
Dept: ADMINISTRATIVE | Facility: HOSPITAL | Age: 77
End: 2022-01-24

## 2022-01-24 DIAGNOSIS — C50.212 MALIGNANT NEOPLASM OF UPPER-INNER QUADRANT OF LEFT FEMALE BREAST: ICD-10-CM

## 2022-01-24 DIAGNOSIS — C50.412 MALIGNANT NEOPLASM OF UPPER-OUTER QUADRANT OF LEFT FEMALE BREAST, UNSPECIFIED ESTROGEN RECEPTOR STATUS: Primary | ICD-10-CM

## 2022-01-24 PROCEDURE — 77065 DX MAMMO INCL CAD UNI: CPT

## 2022-01-24 PROCEDURE — 77065 DX MAMMO INCL CAD UNI: CPT | Performed by: RADIOLOGY

## 2022-01-24 PROCEDURE — 0 LIDOCAINE 1 % SOLUTION: Performed by: RADIOLOGY

## 2022-01-24 PROCEDURE — A4648 IMPLANTABLE TISSUE MARKER: HCPCS

## 2022-01-24 PROCEDURE — 88305 TISSUE EXAM BY PATHOLOGIST: CPT | Performed by: SURGERY

## 2022-01-24 PROCEDURE — G0279 TOMOSYNTHESIS, MAMMO: HCPCS

## 2022-01-24 PROCEDURE — 88341 IMHCHEM/IMCYTCHM EA ADD ANTB: CPT | Performed by: SURGERY

## 2022-01-24 PROCEDURE — 88360 TUMOR IMMUNOHISTOCHEM/MANUAL: CPT | Performed by: SURGERY

## 2022-01-24 PROCEDURE — G0279 TOMOSYNTHESIS, MAMMO: HCPCS | Performed by: RADIOLOGY

## 2022-01-24 PROCEDURE — 19083 BX BREAST 1ST LESION US IMAG: CPT | Performed by: RADIOLOGY

## 2022-01-24 PROCEDURE — 88342 IMHCHEM/IMCYTCHM 1ST ANTB: CPT | Performed by: SURGERY

## 2022-01-24 RX ORDER — LIDOCAINE HYDROCHLORIDE AND EPINEPHRINE 10; 10 MG/ML; UG/ML
10 INJECTION, SOLUTION INFILTRATION; PERINEURAL ONCE
Status: COMPLETED | OUTPATIENT
Start: 2022-01-24 | End: 2022-01-24

## 2022-01-24 RX ORDER — LIDOCAINE HYDROCHLORIDE 10 MG/ML
5 INJECTION, SOLUTION INFILTRATION; PERINEURAL ONCE
Status: COMPLETED | OUTPATIENT
Start: 2022-01-24 | End: 2022-01-24

## 2022-01-24 RX ADMIN — Medication 5 ML: at 11:29

## 2022-01-24 RX ADMIN — LIDOCAINE HYDROCHLORIDE,EPINEPHRINE BITARTRATE 4 ML: 10; .01 INJECTION, SOLUTION INFILTRATION; PERINEURAL at 11:29

## 2022-01-24 NOTE — PROGRESS NOTES
Alert and orientated. Denies discomfort. No active bleeding. Steri-strips not visualized, gauze dressing intact. Ice packs given. Verbalizes and demonstrates understanding of post-care instructions, written copy given.

## 2022-01-26 ENCOUNTER — HOSPITAL ENCOUNTER (OUTPATIENT)
Dept: PET IMAGING | Facility: HOSPITAL | Age: 77
Discharge: HOME | End: 2022-01-26

## 2022-01-26 DIAGNOSIS — C50.412 MALIGNANT NEOPLASM OF UPPER-OUTER QUADRANT OF LEFT FEMALE BREAST, UNSPECIFIED ESTROGEN RECEPTOR STATUS: ICD-10-CM

## 2022-01-26 LAB — GLUCOSE BLDC GLUCOMTR-MCNC: 105 MG/DL (ref 70–130)

## 2022-01-26 PROCEDURE — 0 FLUDEOXYGLUCOSE F18 SOLUTION: Performed by: SURGERY

## 2022-01-26 PROCEDURE — 78815 PET IMAGE W/CT SKULL-THIGH: CPT

## 2022-01-26 PROCEDURE — 82962 GLUCOSE BLOOD TEST: CPT

## 2022-01-26 PROCEDURE — A9552 F18 FDG: HCPCS | Performed by: SURGERY

## 2022-01-26 RX ADMIN — FLUDEOXYGLUCOSE F18 1 DOSE: 300 INJECTION INTRAVENOUS at 13:27

## 2022-01-27 ENCOUNTER — NURSE NAVIGATOR (OUTPATIENT)
Dept: OTHER | Facility: HOSPITAL | Age: 77
End: 2022-01-27

## 2022-01-27 ENCOUNTER — CONSULT (OUTPATIENT)
Dept: ONCOLOGY | Facility: CLINIC | Age: 77
End: 2022-01-27

## 2022-01-27 ENCOUNTER — TELEPHONE (OUTPATIENT)
Dept: MAMMOGRAPHY | Facility: HOSPITAL | Age: 77
End: 2022-01-27

## 2022-01-27 VITALS
HEIGHT: 63 IN | DIASTOLIC BLOOD PRESSURE: 82 MMHG | WEIGHT: 159.7 LBS | HEART RATE: 60 BPM | TEMPERATURE: 97.7 F | BODY MASS INDEX: 28.3 KG/M2 | OXYGEN SATURATION: 96 % | RESPIRATION RATE: 16 BRPM | SYSTOLIC BLOOD PRESSURE: 139 MMHG

## 2022-01-27 DIAGNOSIS — Z17.0 MALIGNANT NEOPLASM OF UPPER-OUTER QUADRANT OF BOTH BREASTS IN FEMALE, ESTROGEN RECEPTOR POSITIVE: Primary | ICD-10-CM

## 2022-01-27 DIAGNOSIS — R91.8 MASS OF LEFT LUNG: Primary | ICD-10-CM

## 2022-01-27 DIAGNOSIS — C50.411 MALIGNANT NEOPLASM OF UPPER-OUTER QUADRANT OF BOTH BREASTS IN FEMALE, ESTROGEN RECEPTOR POSITIVE: Primary | ICD-10-CM

## 2022-01-27 DIAGNOSIS — C50.412 MALIGNANT NEOPLASM OF UPPER-OUTER QUADRANT OF BOTH BREASTS IN FEMALE, ESTROGEN RECEPTOR POSITIVE: Primary | ICD-10-CM

## 2022-01-27 PROCEDURE — 99205 OFFICE O/P NEW HI 60 MIN: CPT | Performed by: INTERNAL MEDICINE

## 2022-01-27 RX ORDER — LETROZOLE 2.5 MG/1
2.5 TABLET, FILM COATED ORAL DAILY
Qty: 90 TABLET | Refills: 3 | Status: SHIPPED | OUTPATIENT
Start: 2022-01-27 | End: 2022-04-27

## 2022-01-27 NOTE — PROGRESS NOTES
ID: 76 y.o. year old female from [place] [address]    PCP: Pepito Zayas MD    REFERRING PHYSICIAN: Saumya Reed MD    Reason for Consultation: Bilateral breast cancer    Dear Dr. Reed    It is a pleasure to meet Ms. Jama today.  She is a very pleasant 76-year-old lady who presents today for consultation due to bilateral breast cancer.  She has a history of endometrioid adenocarcinoma which was diagnosed in 2011 and underwent a hysterectomy with Dr. Melendrez.  She now presents with the left-sided breast mass which was biopsied showed it to be an ER positive WV negative HER-2 negative grade 2 ductal carcinoma.  Subsequently she underwent an MRI which revealed a mass on the right side also which was ER negative WV negative and HER-2 equivocal and FISH is pending on that.  She subsequently underwent a PET scan that showed a left lung mass concerning for metastatic disease.  She is fairly asymptomatic does not have any symptoms to suggest significant disease volume.  She otherwise seems to be in reasonable health for her age.  She does have some underlying coronary disease.  But otherwise no significant chronic issues.      Past Medical History:   Diagnosis Date   • Dyslipidemia    • Heart disease    • Hemorrhoids    • Hypertension    • Kidney disease    • Seasonal allergies        Past Surgical History:   Procedure Laterality Date   • BREAST BIOPSY Left 11/2021    o/s u/s   • BREAST CYST EXCISION      x3. Benign   • CORONARY ANGIOPLASTY WITH STENT PLACEMENT     • URETERAL STENT INSERTION         Social History     Socioeconomic History   • Marital status:    Tobacco Use   • Smoking status: Never Smoker   • Smokeless tobacco: Never Used   Substance and Sexual Activity   • Alcohol use: Yes     Comment: Rare   • Drug use: No   • Sexual activity: Yes     Partners: Male     Comment:         Family History   Problem Relation Age of Onset   • Heart disease Mother    • Diabetes Mother    • Skin  cancer Father    • Colon cancer Father    • Heart disease Father    • Breast cancer Neg Hx    • Ovarian cancer Neg Hx        Review of Systems:    16 point review of systems was performed and reviewed and scanned into the EMR    Review of Systems - Oncology      Current Outpatient Medications:   •  alendronate (FOSAMAX) 70 MG tablet, , Disp: , Rfl:   •  amLODIPine (NORVASC) 10 MG tablet, , Disp: , Rfl: 0  •  aspirin 81 MG EC tablet, Take 81 mg by mouth Daily., Disp: , Rfl:   •  clopidogrel (PLAVIX) 75 MG tablet, , Disp: , Rfl: 0  •  lisinopril-hydrochlorothiazide (PRINZIDE,ZESTORETIC) 20-12.5 MG per tablet, take 2 tablets by mouth twice a day, Disp: , Rfl: 0  •  Multiple Minerals-Vitamins (CALCIUM & VIT D3 BONE HEALTH PO), Take  by mouth., Disp: , Rfl:   •  pantoprazole (PROTONIX) 40 MG EC tablet, , Disp: , Rfl:   •  pravastatin (PRAVACHOL) 80 MG tablet, , Disp: , Rfl: 1  •  ursodiol (ACTIGALL) 500 MG tablet, , Disp: , Rfl:   •  vitamin A 91056 UNIT capsule, Take 10,000 Units by mouth Daily., Disp: , Rfl:   •  vitamin C (ASCORBIC ACID) 500 MG tablet, Take 500 mg by mouth Daily., Disp: , Rfl:   •  letrozole (Femara) 2.5 MG tablet, Take 1 tablet by mouth Daily for 90 days., Disp: 90 tablet, Rfl: 3    Pain Medications             aspirin 81 MG EC tablet Take 81 mg by mouth Daily.           No Known Allergies      ECOG score: 0           Objective     Vitals:    01/27/22 1308   BP: 139/82   Pulse: 60   Resp: 16   Temp: 97.7 °F (36.5 °C)   SpO2: 96%     Body mass index is 28.29 kg/m².  Body surface area is 1.76 meters squared.        01/27/22  1308   Weight: 72.4 kg (159 lb 11.2 oz)     Pain Score    01/27/22 1308   PainSc: 0-No pain          Physical Exam    General: well appearing, in no acute distress  HEENT: sclera anicteric, oropharynx clear, neck is supple  Lymphatics: no cervical, supraclavicular, or axillary adenopathy  Cardiovascular: regular rate and rhythm, no murmurs, rubs or gallops  Lungs: clear to  "auscultation bilaterally  Abdomen: soft, nontender, nondistended.  No palpable organomegaly  Extremities: no lower extremity edema  Skin: no rashes, lesions, bruising, or petechiae  Msk:  Shows no weakness of the large muscle groups  Psych: Mood is stable        Lab Results   Component Value Date    CREATININE 1.00 01/10/2022       No results found for: HGB, HCT, MCV, PLT, WBC, NEUTROABS, LYMPHSABS, MONOSABS, EOSABS, BASOSABS    CT Chest With Contrast Diagnostic    Result Date: 1/10/2022  CT demonstrates no specific osseous correlate to the sternal lesion noted on recent MRI. No corresponding lytic or sclerotic osseous lesion is present. There is no evidence of cortical breakthrough.  An irregular lobulated homogeneous 3.7 x 2.1 cm nodule is noted along the fissure the left lung base. Findings remain suspicious for metastatic involvement, however granulomatous process or possibly pulmonary AVM could have similar appearance. PET/CT would be useful to characterize but this finding and further evaluate for possible CT occult sternal osseous metastasis.  Redemonstration of bilateral soft tissue breast masses concerning for malignancy, better characterized on recent MRI.  This report was finalized on 1/10/2022 3:34 PM by Rodríguez Bacon.      Mammo Post Clip Placement Right    Result Date: 1/27/2022  Findings highly suggestive of malignancy at the 11:00 position of the right breast both mammographically and sonographically.   BI-RADS CATEGORY:  5, HIGHLY SUGGESTIVE OF MALIGNANCY   RECOMMENDATION:  Ultrasound-guided biopsy  CAD was utilized.     10G ELEVATION VACUUM-ASSISTED ULTRASOUND GUIDED CORE BIOPSY    History: Findings highly suggestive of malignancy at the right 11-11 30 position  Procedure: Written and verbal consent was obtained for ultrasound guided core biopsy of a 2 to 3 cm centimeter ill-defined spiculated area corresponding to MRI nonmass enhancement at the right 11-11 30 position \" Time out\" was observed " to verify the patient's identity and correct location of the breast abnormality. The presence of the lesion was confirmed ultrasound and a lateral approach was chosen. The breast was prepped and draped in the usual sterile fashion and 10 mL 1% lidocaine with and without epinephrine was utilized for local anesthesia. A small skin incision was made with a scalpel and a 10-gauge needle with an overlying sheath attached to the core biopsy device was introduced into the breast under direct sonographic guidance. The needle was placed within to the mass. A total of 6 core samples were obtained. The specimens were placed in formalin and forwarded to the pathology department. A post biopsy marking clip was placed. Post biopsy mammographic images were obtained. The clip was noted to be in excellent position in the anterior aspect of the 2 adjacent areas of nonmass enhancement seen by MRI  Upon completion of the procedure, compression was applied to the biopsy site until all appreciable bleeding subsided and a sterile dressing was applied. Post biopsy instructions were reviewed with the patient by our clinical breast imaging staff. A written copy of these instructions was also given to the patient. The patient tolerated the procedure well and no immediate complications occurred.   SUMMARY: 10-gauge ultrasound guided and vacuum assisted core biopsy of a 2-3 cm right breast mass located at the 11-11 30 position.  A post biopsy marking clip was placed.  PATHOLOGY: Invasive ductal carcinoma ER negative, ME negative, HER-2/mary equivocal. Findings are concordant. The patient will be referred to back to [facility]  This report was finalized on 1/27/2022 11:40 AM by Dr. Svetlana Juárez MD.      MRI Breast Bilateral Diagnostic With & Without Contrast    Result Date: 12/30/2021  1. Known biopsy-proven malignancy on the left 2. Findings highly suggestive of malignancy on the right as described 3. Indeterminate sternal lesion  "measuring almost 2 cm with questionable expansion/erosion of the anterior sternal cortex. Dedicated bone imaging is recommended.  RECOMMENDATIONS: 1. Diagnostic right mammography and sonography for evaluation for biopsy of the adjacent areas of concern in the right upper outer quadrant. 2. Dedicated bone evaluation of the sternal lesion.  BI-RADS CATEGORY: 5, HIGHLY SUGGESTIVE OF MALIGNANCY  FINAL MRI RESULTS AND RECOMMENDATIONS WILL BE CALLED TO THE PATIENT BY A BREAST CARE NURSE.  This report was finalized on 12/30/2021 11:48 AM by Dr. Svetlana Juárez MD.      NM PET/CT Skull Base to Mid Thigh    Result Date: 1/26/2022   Hypermetabolic soft tissue nodule in the left breast compatible with biopsy-proven invasive ductal carcinoma. There is diffuse low level FDG uptake in the region of recent right breast biopsy site. A hypermetabolic mass in the left lower lobe is suspicious for pulmonary metastasis. No additional sites of metastases are identified. There is no evidence of discrete/focal hypermetabolic lesion of the sternum to correspond with the indeterminant sternal lesion described on breast MRI exam.   This report was finalized on 1/26/2022 3:33 PM by Anil Saha MD.      Mammo Diagnostic Digital Tomosynthesis Right With CAD    Result Date: 1/27/2022  Findings highly suggestive of malignancy at the 11:00 position of the right breast both mammographically and sonographically.   BI-RADS CATEGORY:  5, HIGHLY SUGGESTIVE OF MALIGNANCY   RECOMMENDATION:  Ultrasound-guided biopsy  CAD was utilized.     10G ELEVATION VACUUM-ASSISTED ULTRASOUND GUIDED CORE BIOPSY    History: Findings highly suggestive of malignancy at the right 11-11 30 position  Procedure: Written and verbal consent was obtained for ultrasound guided core biopsy of a 2 to 3 cm centimeter ill-defined spiculated area corresponding to MRI nonmass enhancement at the right 11-11 30 position \" Time out\" was observed to verify the patient's identity and " correct location of the breast abnormality. The presence of the lesion was confirmed ultrasound and a lateral approach was chosen. The breast was prepped and draped in the usual sterile fashion and 10 mL 1% lidocaine with and without epinephrine was utilized for local anesthesia. A small skin incision was made with a scalpel and a 10-gauge needle with an overlying sheath attached to the core biopsy device was introduced into the breast under direct sonographic guidance. The needle was placed within to the mass. A total of 6 core samples were obtained. The specimens were placed in formalin and forwarded to the pathology department. A post biopsy marking clip was placed. Post biopsy mammographic images were obtained. The clip was noted to be in excellent position in the anterior aspect of the 2 adjacent areas of nonmass enhancement seen by MRI  Upon completion of the procedure, compression was applied to the biopsy site until all appreciable bleeding subsided and a sterile dressing was applied. Post biopsy instructions were reviewed with the patient by our clinical breast imaging staff. A written copy of these instructions was also given to the patient. The patient tolerated the procedure well and no immediate complications occurred.   SUMMARY: 10-gauge ultrasound guided and vacuum assisted core biopsy of a 2-3 cm right breast mass located at the 11-11 30 position.  A post biopsy marking clip was placed.  PATHOLOGY: Invasive ductal carcinoma ER negative, VA negative, HER-2/mary equivocal. Findings are concordant. The patient will be referred to back to [facility]  This report was finalized on 1/27/2022 11:40 AM by Dr. Svetlana Juárez MD.      US Guided Breast Biopsy With & Without Device initial    Result Date: 1/27/2022  Findings highly suggestive of malignancy at the 11:00 position of the right breast both mammographically and sonographically.   BI-RADS CATEGORY:  5, HIGHLY SUGGESTIVE OF MALIGNANCY    "RECOMMENDATION:  Ultrasound-guided biopsy  CAD was utilized.     10G ELEVATION VACUUM-ASSISTED ULTRASOUND GUIDED CORE BIOPSY    History: Findings highly suggestive of malignancy at the right 11-11 30 position  Procedure: Written and verbal consent was obtained for ultrasound guided core biopsy of a 2 to 3 cm centimeter ill-defined spiculated area corresponding to MRI nonmass enhancement at the right 11-11 30 position \" Time out\" was observed to verify the patient's identity and correct location of the breast abnormality. The presence of the lesion was confirmed ultrasound and a lateral approach was chosen. The breast was prepped and draped in the usual sterile fashion and 10 mL 1% lidocaine with and without epinephrine was utilized for local anesthesia. A small skin incision was made with a scalpel and a 10-gauge needle with an overlying sheath attached to the core biopsy device was introduced into the breast under direct sonographic guidance. The needle was placed within to the mass. A total of 6 core samples were obtained. The specimens were placed in formalin and forwarded to the pathology department. A post biopsy marking clip was placed. Post biopsy mammographic images were obtained. The clip was noted to be in excellent position in the anterior aspect of the 2 adjacent areas of nonmass enhancement seen by MRI  Upon completion of the procedure, compression was applied to the biopsy site until all appreciable bleeding subsided and a sterile dressing was applied. Post biopsy instructions were reviewed with the patient by our clinical breast imaging staff. A written copy of these instructions was also given to the patient. The patient tolerated the procedure well and no immediate complications occurred.   SUMMARY: 10-gauge ultrasound guided and vacuum assisted core biopsy of a 2-3 cm right breast mass located at the 11-11 30 position.  A post biopsy marking clip was placed.  PATHOLOGY: Invasive ductal carcinoma ER " negative, RI negative, HER-2/mary equivocal. Findings are concordant. The patient will be referred to back to [facility]  This report was finalized on 1/27/2022 11:40 AM by Dr. Svetlana Juárez MD.            Assessment/Plan      1.  Bilateral breast cancer with a triple negative breast cancer on the right and an ER positive disease on the left.  It is unclear what her lung lesion is.  This will affect how we approach her treatment.  So I have recommended a biopsy of the lung lesion to determine the type of malignancy that she has.  If it happens to be an ER positive disease then I plan to place her on hormone blockade with a CDK 4 6 inhibitor and consider resection of her triple negative breast cancer.  If it is triple negative breast cancer then my plan is to send it off for NexGen sequencing and also have surgery on the ER positive breast.  This is allow us to manage 1 disease and not have to worry about 2 diseases at the same time.  I discussed the concern that she has stage IV disease and the disease is not curable.  We discussed expectations of her disease and the plan going forward.  I am going to see her after she undergoes her biopsy.  In the meantime I do not think we are not hurting her by giving her hormone blockade at this time.  I am going to place her on Femara for now.  She is comfortable with this decision and I will see her in 3 weeks and hopefully the biopsies are completed by then.    Total time of patient care on day of service including time prior to, face to face with patient, and following visit spent in reviewing records, lab results, imaging studies, discussion with patient, and documentation/charting was > 65 minutes.        Thank you for allowing me to participate in the care of this patient.    Yours sincerely,    Ruby Trevino MD  [facility]  Hematology and Oncology    Return in (Approximately): 3 weeks    No orders of the defined types were placed in this  encounter.

## 2022-01-27 NOTE — PROGRESS NOTES
I saw patient with Dr Reed - Dr Reed discussed the MRI biopsy result from right breast and result from PET scan which showed a positive lung nodule that is suspicious for metastatic disease - the patient will see Dr Lieberman today at 12:45 - Dr Reed and I discussed port placement with the patient.

## 2022-01-31 LAB
CYTO UR: NORMAL
LAB AP CASE REPORT: NORMAL
LAB AP CLINICAL INFORMATION: NORMAL
LAB AP DIAGNOSIS COMMENT: NORMAL
LAB AP INTEGRATED ONCOLOGY, ADDENDUM: NORMAL
LAB AP SPECIAL STAINS: NORMAL
PATH REPORT.FINAL DX SPEC: NORMAL
PATH REPORT.GROSS SPEC: NORMAL

## 2022-01-31 RX ORDER — FERROUS SULFATE 325(65) MG
325 TABLET ORAL
COMMUNITY

## 2022-01-31 RX ORDER — CARVEDILOL 12.5 MG/1
12.5 TABLET ORAL 2 TIMES DAILY WITH MEALS
COMMUNITY

## 2022-01-31 RX ORDER — EZETIMIBE 10 MG/1
10 TABLET ORAL NIGHTLY
COMMUNITY

## 2022-01-31 RX ORDER — LISINOPRIL 40 MG/1
40 TABLET ORAL DAILY
COMMUNITY

## 2022-01-31 RX ORDER — NIFEDIPINE 30 MG/1
30 TABLET, EXTENDED RELEASE ORAL DAILY
COMMUNITY

## 2022-02-01 ENCOUNTER — HOSPITAL ENCOUNTER (OUTPATIENT)
Age: 77
End: 2022-02-01
Payer: MEDICARE

## 2022-02-01 DIAGNOSIS — Z20.822: Primary | ICD-10-CM

## 2022-02-01 PROCEDURE — U0004 COV-19 TEST NON-CDC HGH THRU: HCPCS

## 2022-02-01 PROCEDURE — U0005 INFEC AGEN DETEC AMPLI PROBE: HCPCS

## 2022-02-01 PROCEDURE — C9803 HOPD COVID-19 SPEC COLLECT: HCPCS

## 2022-02-02 LAB — COVID-19 NASAL PCR SENDOUT LEX: NOT DETECTED

## 2022-02-02 RX ORDER — SODIUM CHLORIDE 0.9 % (FLUSH) 0.9 %
10 SYRINGE (ML) INJECTION AS NEEDED
Status: CANCELLED | OUTPATIENT
Start: 2022-02-02

## 2022-02-02 RX ORDER — SODIUM CHLORIDE 0.9 % (FLUSH) 0.9 %
3 SYRINGE (ML) INJECTION EVERY 12 HOURS SCHEDULED
Status: CANCELLED | OUTPATIENT
Start: 2022-02-02

## 2022-02-03 ENCOUNTER — HOSPITAL ENCOUNTER (OUTPATIENT)
Dept: CT IMAGING | Facility: HOSPITAL | Age: 77
Discharge: HOME | End: 2022-02-03

## 2022-02-11 ENCOUNTER — HOSPITAL ENCOUNTER (OUTPATIENT)
Age: 77
End: 2022-02-11
Payer: MEDICARE

## 2022-02-11 DIAGNOSIS — Z20.822: Primary | ICD-10-CM

## 2022-02-11 PROCEDURE — U0004 COV-19 TEST NON-CDC HGH THRU: HCPCS

## 2022-02-11 PROCEDURE — C9803 HOPD COVID-19 SPEC COLLECT: HCPCS

## 2022-02-11 PROCEDURE — U0005 INFEC AGEN DETEC AMPLI PROBE: HCPCS

## 2022-02-12 LAB — COVID-19 NASAL PCR SENDOUT LEX: NOT DETECTED

## 2022-02-14 ENCOUNTER — HOSPITAL ENCOUNTER (OUTPATIENT)
Dept: GENERAL RADIOLOGY | Facility: HOSPITAL | Age: 77
Discharge: HOME | End: 2022-02-14

## 2022-02-14 ENCOUNTER — HOSPITAL ENCOUNTER (OUTPATIENT)
Dept: CT IMAGING | Facility: HOSPITAL | Age: 77
Discharge: HOME | End: 2022-02-14

## 2022-02-14 VITALS
SYSTOLIC BLOOD PRESSURE: 119 MMHG | HEART RATE: 52 BPM | OXYGEN SATURATION: 99 % | RESPIRATION RATE: 18 BRPM | DIASTOLIC BLOOD PRESSURE: 66 MMHG

## 2022-02-14 DIAGNOSIS — R91.8 MASS OF LEFT LUNG: ICD-10-CM

## 2022-02-14 LAB
ANION GAP SERPL CALCULATED.3IONS-SCNC: 11 MMOL/L (ref 5–15)
BASOPHILS # BLD AUTO: 0.04 10*3/MM3 (ref 0–0.2)
BASOPHILS NFR BLD AUTO: 0.5 % (ref 0–1.5)
BUN SERPL-MCNC: 17 MG/DL (ref 8–23)
BUN/CREAT SERPL: 20.7 (ref 7–25)
CALCIUM SPEC-SCNC: 9.6 MG/DL (ref 8.6–10.5)
CHLORIDE SERPL-SCNC: 99 MMOL/L (ref 98–107)
CO2 SERPL-SCNC: 23 MMOL/L (ref 22–29)
CREAT SERPL-MCNC: 0.82 MG/DL (ref 0.57–1)
DEPRECATED RDW RBC AUTO: 41.2 FL (ref 37–54)
EOSINOPHIL # BLD AUTO: 0.09 10*3/MM3 (ref 0–0.4)
EOSINOPHIL NFR BLD AUTO: 1.2 % (ref 0.3–6.2)
ERYTHROCYTE [DISTWIDTH] IN BLOOD BY AUTOMATED COUNT: 12.1 % (ref 12.3–15.4)
GFR SERPL CREATININE-BSD FRML MDRD: 68 ML/MIN/1.73
GLUCOSE SERPL-MCNC: 128 MG/DL (ref 65–99)
HCT VFR BLD AUTO: 38.5 % (ref 34–46.6)
HGB BLD-MCNC: 12.8 G/DL (ref 12–15.9)
IMM GRANULOCYTES # BLD AUTO: 0.03 10*3/MM3 (ref 0–0.05)
IMM GRANULOCYTES NFR BLD AUTO: 0.4 % (ref 0–0.5)
INR PPP: 0.94 (ref 0.85–1.16)
LYMPHOCYTES # BLD AUTO: 1.37 10*3/MM3 (ref 0.7–3.1)
LYMPHOCYTES NFR BLD AUTO: 17.8 % (ref 19.6–45.3)
MCH RBC QN AUTO: 30.7 PG (ref 26.6–33)
MCHC RBC AUTO-ENTMCNC: 33.2 G/DL (ref 31.5–35.7)
MCV RBC AUTO: 92.3 FL (ref 79–97)
MONOCYTES # BLD AUTO: 0.79 10*3/MM3 (ref 0.1–0.9)
MONOCYTES NFR BLD AUTO: 10.3 % (ref 5–12)
NEUTROPHILS NFR BLD AUTO: 5.38 10*3/MM3 (ref 1.7–7)
NEUTROPHILS NFR BLD AUTO: 69.8 % (ref 42.7–76)
NRBC BLD AUTO-RTO: 0 /100 WBC (ref 0–0.2)
PLATELET # BLD AUTO: 142 10*3/MM3 (ref 140–450)
PMV BLD AUTO: 8.8 FL (ref 6–12)
POTASSIUM SERPL-SCNC: 4.6 MMOL/L (ref 3.5–5.2)
PROTHROMBIN TIME: 12.3 SECONDS (ref 11.4–14.4)
RBC # BLD AUTO: 4.17 10*6/MM3 (ref 3.77–5.28)
SODIUM SERPL-SCNC: 133 MMOL/L (ref 136–145)
WBC NRBC COR # BLD: 7.7 10*3/MM3 (ref 3.4–10.8)

## 2022-02-14 PROCEDURE — 25010000002 FENTANYL CITRATE (PF) 50 MCG/ML SOLUTION: Performed by: RADIOLOGY

## 2022-02-14 PROCEDURE — 99152 MOD SED SAME PHYS/QHP 5/>YRS: CPT

## 2022-02-14 PROCEDURE — 85610 PROTHROMBIN TIME: CPT | Performed by: RADIOLOGY

## 2022-02-14 PROCEDURE — 71045 X-RAY EXAM CHEST 1 VIEW: CPT

## 2022-02-14 PROCEDURE — 88341 IMHCHEM/IMCYTCHM EA ADD ANTB: CPT | Performed by: INTERNAL MEDICINE

## 2022-02-14 PROCEDURE — 80048 BASIC METABOLIC PNL TOTAL CA: CPT | Performed by: RADIOLOGY

## 2022-02-14 PROCEDURE — 0 LIDOCAINE 1 % SOLUTION: Performed by: RADIOLOGY

## 2022-02-14 PROCEDURE — 25010000002 MIDAZOLAM PER 1 MG: Performed by: RADIOLOGY

## 2022-02-14 PROCEDURE — 88307 TISSUE EXAM BY PATHOLOGIST: CPT | Performed by: INTERNAL MEDICINE

## 2022-02-14 PROCEDURE — 85025 COMPLETE CBC W/AUTO DIFF WBC: CPT | Performed by: RADIOLOGY

## 2022-02-14 PROCEDURE — 88342 IMHCHEM/IMCYTCHM 1ST ANTB: CPT | Performed by: INTERNAL MEDICINE

## 2022-02-14 RX ORDER — MIDAZOLAM HYDROCHLORIDE 1 MG/ML
INJECTION INTRAMUSCULAR; INTRAVENOUS
Status: COMPLETED | OUTPATIENT
Start: 2022-02-14 | End: 2022-02-14

## 2022-02-14 RX ORDER — HYDROCODONE BITARTRATE AND ACETAMINOPHEN 5; 325 MG/1; MG/1
1 TABLET ORAL EVERY 4 HOURS PRN
Status: DISCONTINUED | OUTPATIENT
Start: 2022-02-14 | End: 2022-02-15 | Stop reason: HOSPADM

## 2022-02-14 RX ORDER — FENTANYL CITRATE 50 UG/ML
INJECTION, SOLUTION INTRAMUSCULAR; INTRAVENOUS
Status: COMPLETED | OUTPATIENT
Start: 2022-02-14 | End: 2022-02-14

## 2022-02-14 RX ORDER — SODIUM CHLORIDE 0.9 % (FLUSH) 0.9 %
10 SYRINGE (ML) INJECTION AS NEEDED
Status: DISCONTINUED | OUTPATIENT
Start: 2022-02-14 | End: 2022-02-15 | Stop reason: HOSPADM

## 2022-02-14 RX ORDER — SODIUM BICARBONATE 42 MG/ML
INJECTION, SOLUTION INTRAVENOUS
Status: DISPENSED
Start: 2022-02-14 | End: 2022-02-14

## 2022-02-14 RX ORDER — SODIUM CHLORIDE 0.9 % (FLUSH) 0.9 %
3 SYRINGE (ML) INJECTION EVERY 12 HOURS SCHEDULED
Status: DISCONTINUED | OUTPATIENT
Start: 2022-02-14 | End: 2022-02-15 | Stop reason: HOSPADM

## 2022-02-14 RX ORDER — MIDAZOLAM HYDROCHLORIDE 1 MG/ML
INJECTION INTRAMUSCULAR; INTRAVENOUS
Status: DISPENSED
Start: 2022-02-14 | End: 2022-02-14

## 2022-02-14 RX ORDER — FENTANYL CITRATE 50 UG/ML
INJECTION, SOLUTION INTRAMUSCULAR; INTRAVENOUS
Status: DISPENSED
Start: 2022-02-14 | End: 2022-02-14

## 2022-02-14 RX ORDER — MORPHINE SULFATE 2 MG/ML
2 INJECTION, SOLUTION INTRAMUSCULAR; INTRAVENOUS
Status: DISCONTINUED | OUTPATIENT
Start: 2022-02-14 | End: 2022-02-15 | Stop reason: HOSPADM

## 2022-02-14 RX ORDER — LIDOCAINE HYDROCHLORIDE 10 MG/ML
10 INJECTION, SOLUTION INFILTRATION; PERINEURAL ONCE
Status: COMPLETED | OUTPATIENT
Start: 2022-02-14 | End: 2022-02-14

## 2022-02-14 RX ADMIN — FENTANYL CITRATE 50 MCG: 50 INJECTION, SOLUTION INTRAMUSCULAR; INTRAVENOUS at 09:58

## 2022-02-14 RX ADMIN — MIDAZOLAM HYDROCHLORIDE 1 MG: 1 INJECTION, SOLUTION INTRAMUSCULAR; INTRAVENOUS at 09:59

## 2022-02-14 RX ADMIN — LIDOCAINE HYDROCHLORIDE 10 ML: 10 INJECTION, SOLUTION INFILTRATION; PERINEURAL at 10:34

## 2022-02-14 RX ADMIN — FENTANYL CITRATE 50 MCG: 50 INJECTION, SOLUTION INTRAMUSCULAR; INTRAVENOUS at 10:08

## 2022-02-14 RX ADMIN — MIDAZOLAM HYDROCHLORIDE 1 MG: 1 INJECTION, SOLUTION INTRAMUSCULAR; INTRAVENOUS at 10:08

## 2022-02-14 NOTE — NURSING NOTE
Spoke with Dr Wilkins and updated him on patient condition. Stable at this time. Denies pain and SOB

## 2022-02-14 NOTE — DISCHARGE INSTRUCTIONS
If you develop shortness of breath  or chest pain, Go to ER and let them know you have had a needle biopsy of lung.  Light activity x 48hrs  Results will be discussed with you by Dr Lieberman

## 2022-02-14 NOTE — PRE-PROCEDURE NOTE
[facility]   Vascular Interventional Radiology  History & Physicial    Patient Name:Kenisha Jama    : 1945    MRN: [id number]    Primary Care Physician: Pepito Zayas MD    Referring Physician: Ruby Trevino MD     Date of admission: 2022    Subjective     Reason for Consult: L lung biopsy    History of Present Illness     Kenisha Jama is a 76 y.o. female referred to IR as noted above.      Active Hospital Problems:  There are no active hospital problems to display for this patient.      Personal History     Past Medical History:   Diagnosis Date   • Dyslipidemia    • Heart disease    • Hemorrhoids    • Hypertension    • Kidney disease    • Seasonal allergies        Past Surgical History:   Procedure Laterality Date   • BREAST BIOPSY Left 2021    o/s u/s   • BREAST CYST EXCISION      x3. Benign   • CORONARY ANGIOPLASTY WITH STENT PLACEMENT     • URETERAL STENT INSERTION         Family History: Her family history includes Colon cancer in her father; Diabetes in her mother; Heart disease in her father and mother; Skin cancer in her father.     Social History: She  reports that she has never smoked. She has never used smokeless tobacco. She reports current alcohol use. She reports that she does not use drugs.    Home Medications:  NIFEdipine XL, carvedilol, ezetimibe, ferrous sulfate, letrozole, lisinopril, pantoprazole, pravastatin, and ursodiol    Current Medications:  •  fentaNYL citrate (PF)  •  midazolam  •  sodium chloride  •  sodium chloride     Allergies:  She has No Known Allergies.    Review of Systems    Objective     There were no vitals taken for this visit.     Physical Exam    A&Ox3. Able to communicate  No Apparent Distress  Average physique      Result Review      I have personally reviewed the results from the time of this admission to 2022 09:01 EST and agree with these findings.  [x]  Laboratory  []  Microbiology  [x]  Radiology  []  EKG/Telemetry    []  Cardiology/Vascular   []  Pathology  []  Old records  []  Other:    Most notable findings include: As noted:    Results from last 7 days   Lab Units 02/14/22  0758 02/14/22  0747   INR  0.94  --    HEMOGLOBIN g/dL  --  12.8   HEMATOCRIT %  --  38.5   PLATELETS 10*3/mm3  --  142       Estimated Creatinine Clearance: 56.5 mL/min (by C-G formula based on SCr of 0.82 mg/dL).   Creatinine   Date Value Ref Range Status   02/14/2022 0.82 0.57 - 1.00 mg/dL Final       No results found for: COVID19       Assessment / Plan     Kenisha Jama is a 76 y.o. female referred to the IR service with above problem.    Plan:   As above.    Geovani Wilkins MD   Vascular Interventional Radiology  02/14/22   9:01 AM EST

## 2022-02-14 NOTE — NURSING NOTE
CXR completed and reviewed by Dr Wilkins. Dr Wilkins at bedside and discussing procedure with patient and her .

## 2022-02-14 NOTE — NURSING NOTE
CT guided left lung biopsy performed by Dr Wilkins. Several samples obtained, and taken to lab by CT tech. Patient positioned on left side with bedrest orders. Patient was sedated with 2 mg Versed and 100 mcg Fentanyl; with a sedation time of 19 minutes. Patient tolerated well. Report called to nurse.  .6

## 2022-02-15 ENCOUNTER — TELEPHONE (OUTPATIENT)
Dept: INFUSION THERAPY | Facility: HOSPITAL | Age: 77
End: 2022-02-15

## 2022-02-17 ENCOUNTER — OFFICE VISIT (OUTPATIENT)
Dept: ONCOLOGY | Facility: CLINIC | Age: 77
End: 2022-02-17

## 2022-02-17 VITALS
WEIGHT: 161 LBS | HEART RATE: 67 BPM | BODY MASS INDEX: 28.53 KG/M2 | TEMPERATURE: 96.9 F | SYSTOLIC BLOOD PRESSURE: 138 MMHG | OXYGEN SATURATION: 96 % | HEIGHT: 63 IN | RESPIRATION RATE: 16 BRPM | DIASTOLIC BLOOD PRESSURE: 74 MMHG

## 2022-02-17 DIAGNOSIS — C50.412 MALIGNANT NEOPLASM OF UPPER-OUTER QUADRANT OF BOTH BREASTS IN FEMALE, ESTROGEN RECEPTOR POSITIVE: Primary | ICD-10-CM

## 2022-02-17 DIAGNOSIS — Z17.0 MALIGNANT NEOPLASM OF UPPER-OUTER QUADRANT OF BOTH BREASTS IN FEMALE, ESTROGEN RECEPTOR POSITIVE: Primary | ICD-10-CM

## 2022-02-17 DIAGNOSIS — C50.411 MALIGNANT NEOPLASM OF UPPER-OUTER QUADRANT OF BOTH BREASTS IN FEMALE, ESTROGEN RECEPTOR POSITIVE: Primary | ICD-10-CM

## 2022-02-17 PROCEDURE — 99214 OFFICE O/P EST MOD 30 MIN: CPT | Performed by: INTERNAL MEDICINE

## 2022-02-17 NOTE — PROGRESS NOTES
PROBLEM LIST:  Oncology/Hematology History   Endometrial cancer (HCC)   9/30/2011 Imaging    TVUS and CT scan for palpable left pelvic mass upon annual exam and new abdominal symptoms.      10/6/2011 Surgery    ADY/BSO with pelvic lymph node dissection. 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  Right breast cancer-invasive ductal carcinoma grade 2-ER negative GA negative HER-2 negative    2.  Left breast cancer-invasive ductal carcinoma grade 2-ER positive GA negative HER-2 negative     1/26/2022 Imaging    EXAMINATION: NM PET/CT SKULL BASE TO MID THIGH      FINDINGS:      Today's 3-D images demonstrate focal hypermetabolism in the soft tissues  of the left breast as well as focal hypermetabolism within the left  midlung.     Multiplanar images of the head and neck demonstrate symmetric FDG uptake  within the brain. There is no evidence of hypermetabolic neck mass or  lymph node.     In the upper outer quadrant of the left breast there is redemonstration  of a irregular 2.1 cm soft tissue nodule with FDG hypermetabolism of SUV  max 4.2, compatible with biopsy-proven invasive ductal carcinoma. There  is an ill-defined diffuse region of low-level hypermetabolism in the  right breast with adjacent biopsy clip and single locule of soft tissue  air, compatible with recent right breast biopsy. A discrete  hypermetabolic nodule or mass in the right breast is not confidently  identified. There is no hypermetabolic or enlarged axillary lymph nodes.     Within the chest there is redemonstration of a lobulated soft tissue  mass in the superior aspect of the left lower lobe which appears  hypermetabolic with SUV max 6.5. There is no evidence  of hypermetabolic  mediastinal or hilar adenopathy.     Below the diaphragm there is expected physiologic uptake within the   and GI tracts. No evidence of abdominal pelvic malignancy or metastasis.  No hypermetabolic abdominopelvic lymph nodes. Photopenic left renal cyst  is noted.     There is no hypermetabolic pathologic marrow process. Specifically,  there is no evidence of hypermetabolic lesion of the sternum to  correspond with the indeterminant sternal lesion detailed on prior  breast MRI exam. FDG uptake at the left antecubital fossa reflects site  of IV administration.     IMPRESSION:     Hypermetabolic soft tissue nodule in the left breast compatible with  biopsy-proven invasive ductal carcinoma. There is diffuse low level FDG  uptake in the region of recent right breast biopsy site. A  hypermetabolic mass in the left lower lobe is suspicious for pulmonary  metastasis. No additional sites of metastases are identified. There is  no evidence of discrete/focal hypermetabolic lesion of the sternum to  correspond with the indeterminant sternal lesion described on breast MRI  exam.               REASON FOR VISIT: Bilateral breast cancer    HISTORY OF PRESENT ILLNESS:   76 y.o.  female presents today for follow-up of her bilateral breast cancer after undergoing biopsy of the lung mass.  The biopsy of the lung mass is malignant.  Unfortunately we are still working on additional staining to compare them to the 2 breast cancers.  She is currently on letrozole.  Seems to be tolerating it reasonably well.  No major issues with pain.  No cough.    Past medical history, social history and family history was reviewed 02/17/22 and unchanged from prior visit.    Review of Systems:    Review of Systems - Oncology         Medications:        Current Outpatient Medications:   •  carvedilol (COREG) 12.5 MG tablet, Take 12.5 mg by mouth 2 (Two) Times a Day With Meals., Disp: , Rfl:   •  ezetimibe (ZETIA) 10 MG tablet, Take 10  "mg by mouth Every Night., Disp: , Rfl:   •  ferrous sulfate 325 (65 FE) MG tablet, Take 325 mg by mouth Daily With Breakfast., Disp: , Rfl:   •  letrozole (Femara) 2.5 MG tablet, Take 1 tablet by mouth Daily for 90 days., Disp: 90 tablet, Rfl: 3  •  lisinopril (PRINIVIL,ZESTRIL) 40 MG tablet, Take 40 mg by mouth Daily., Disp: , Rfl:   •  NIFEdipine XL (PROCARDIA XL) 30 MG 24 hr tablet, Take 30 mg by mouth Daily., Disp: , Rfl:   •  pantoprazole (PROTONIX) 40 MG EC tablet, , Disp: , Rfl:   •  pravastatin (PRAVACHOL) 80 MG tablet, , Disp: , Rfl: 1  •  ursodiol (ACTIGALL) 500 MG tablet, , Disp: , Rfl:            ALLERGIES:  No Known Allergies      Physical Exam    VITAL SIGNS:  /74   Pulse 67   Temp 96.9 °F (36.1 °C) (Temporal)   Resp 16   Ht 160 cm (63\")   Wt 73 kg (161 lb)   SpO2 96%   BMI 28.52 kg/m²                 Wt Readings from Last 3 Encounters:   02/17/22 73 kg (161 lb)   01/27/22 72.4 kg (159 lb 11.2 oz)   12/30/21 71.7 kg (158 lb)       Body mass index is 28.52 kg/m². Body surface area is 1.76 meters squared.    Pain Score    02/17/22 0905   PainSc: 0-No pain           Performance Status: 0    General: well appearing, in no acute distress  HEENT: sclera anicteric, neck is supple  Lymphatics: no cervical, supraclavicular, or axillary adenopathy  Cardiovascular: regular rate and rhythm, no murmurs, rubs or gallops  Lungs: clear to auscultation bilaterally  Abdomen: soft, nontender, nondistended.  No palpable organomegaly  Extremities: no lower extremity edema  Skin: no rashes, lesions, bruising, or petechiae  Msk:  Shows no weakness of the large muscle groups  Psych: Mood is stable        RECENT LABS:    Lab Results   Component Value Date    HGB 12.8 02/14/2022    HCT 38.5 02/14/2022    MCV 92.3 02/14/2022     02/14/2022    WBC 7.70 02/14/2022    NEUTROABS 5.38 02/14/2022    LYMPHSABS 1.37 02/14/2022    MONOSABS 0.79 02/14/2022    EOSABS 0.09 02/14/2022    BASOSABS 0.04 02/14/2022       Lab " Results   Component Value Date    GLUCOSE 128 (H) 02/14/2022    BUN 17 02/14/2022    CREATININE 0.82 02/14/2022     (L) 02/14/2022    K 4.6 02/14/2022    CL 99 02/14/2022    CO2 23.0 02/14/2022    CALCIUM 9.6 02/14/2022             Assessment/Plan    1.  Bilateral breast cancer still awaiting pathology to confirm which malignancy it is.  If it happens to be a triple negative breast cancer I have recommended removal of the mass on the left which is ER positive.  Subsequently I will place her on Xeloda for her triple negative breast cancer.  If it happens to be an ER positive disease then I would suggest surgery on the right which is the triple negative disease and place her on letrozole with a CDK 4/6 inhibitor.  This will also help us with prognosis going forward.  I will likely send next generation sequencing on the metastatic focus.  I spoke with pathology today who are still doing additional staining since the preliminary data did not suggest a primary malignancy as of yet.    2.  History of endometrioid cancer          Ruby Trevino MD  [facility]ematology [facility]         Orders Placed This Encounter   Procedures   • SCANNED - LABS       2/17/2022

## 2022-02-23 ENCOUNTER — HOSPITAL ENCOUNTER (OUTPATIENT)
Age: 77
End: 2022-02-23
Payer: MEDICARE

## 2022-02-23 DIAGNOSIS — C50.412 MALIGNANT NEOPLASM OF UPPER-OUTER QUADRANT OF BOTH BREASTS IN FEMALE, ESTROGEN RECEPTOR POSITIVE: Primary | ICD-10-CM

## 2022-02-23 DIAGNOSIS — Z17.0 MALIGNANT NEOPLASM OF UPPER-OUTER QUADRANT OF BOTH BREASTS IN FEMALE, ESTROGEN RECEPTOR POSITIVE: Primary | ICD-10-CM

## 2022-02-23 DIAGNOSIS — D50.9: Primary | ICD-10-CM

## 2022-02-23 DIAGNOSIS — C54.1 ENDOMETRIAL CANCER: ICD-10-CM

## 2022-02-23 DIAGNOSIS — C50.411 MALIGNANT NEOPLASM OF UPPER-OUTER QUADRANT OF BOTH BREASTS IN FEMALE, ESTROGEN RECEPTOR POSITIVE: Primary | ICD-10-CM

## 2022-02-23 LAB
FERRITIN SERPL-MCNC: 95.8 NG/ML (ref 11.1–264)
HCT VFR BLD CALC: 40.3 % (ref 37–47)
HGB BLD-MCNC: 13.2 G/DL (ref 12.2–16.2)
IRON SERPL QL: 94 UG/DL (ref 37–170)
MCHC RBC-ENTMCNC: 32.8 G/DL (ref 31.8–35.4)
MCV RBC: 93.3 FL (ref 81–99)
MEAN CORPUSCULAR HEMOGLOBIN: 30.6 PG (ref 27–31.2)
PLATELET # BLD: 171 K/MM3 (ref 142–424)
RBC # BLD AUTO: 4.33 M/MM3 (ref 4.2–5.4)
TOTAL IRON BINDING CAPACITY: 306 UG/DL (ref 265–497)
WBC # BLD AUTO: 6.8 K/MM3 (ref 4.8–10.8)

## 2022-02-23 PROCEDURE — 83550 IRON BINDING TEST: CPT

## 2022-02-23 PROCEDURE — 83540 ASSAY OF IRON: CPT

## 2022-02-23 PROCEDURE — 85025 COMPLETE CBC W/AUTO DIFF WBC: CPT

## 2022-02-23 PROCEDURE — 36415 COLL VENOUS BLD VENIPUNCTURE: CPT

## 2022-02-23 PROCEDURE — 82728 ASSAY OF FERRITIN: CPT

## 2022-02-24 ENCOUNTER — TELEPHONE (OUTPATIENT)
Dept: GYNECOLOGIC ONCOLOGY | Facility: CLINIC | Age: 77
End: 2022-02-24

## 2022-02-24 ENCOUNTER — OFFICE VISIT (OUTPATIENT)
Dept: GYNECOLOGIC ONCOLOGY | Facility: CLINIC | Age: 77
End: 2022-02-24

## 2022-02-24 VITALS
DIASTOLIC BLOOD PRESSURE: 81 MMHG | BODY MASS INDEX: 28.86 KG/M2 | HEART RATE: 60 BPM | WEIGHT: 162.9 LBS | SYSTOLIC BLOOD PRESSURE: 172 MMHG | OXYGEN SATURATION: 98 % | HEIGHT: 63 IN | RESPIRATION RATE: 17 BRPM

## 2022-02-24 DIAGNOSIS — C50.412 MALIGNANT NEOPLASM OF UPPER-OUTER QUADRANT OF BOTH BREASTS IN FEMALE, ESTROGEN RECEPTOR POSITIVE: ICD-10-CM

## 2022-02-24 DIAGNOSIS — C54.1 ENDOMETRIAL CANCER: Primary | ICD-10-CM

## 2022-02-24 DIAGNOSIS — R91.8 MASS OF LEFT LUNG: ICD-10-CM

## 2022-02-24 DIAGNOSIS — Z17.0 MALIGNANT NEOPLASM OF UPPER-OUTER QUADRANT OF BOTH BREASTS IN FEMALE, ESTROGEN RECEPTOR POSITIVE: ICD-10-CM

## 2022-02-24 DIAGNOSIS — C50.411 MALIGNANT NEOPLASM OF UPPER-OUTER QUADRANT OF BOTH BREASTS IN FEMALE, ESTROGEN RECEPTOR POSITIVE: ICD-10-CM

## 2022-02-24 PROCEDURE — 99204 OFFICE O/P NEW MOD 45 MIN: CPT | Performed by: OBSTETRICS & GYNECOLOGY

## 2022-02-24 RX ORDER — CELECOXIB 100 MG/1
200 CAPSULE ORAL ONCE
Status: CANCELLED | OUTPATIENT
Start: 2022-02-24 | End: 2022-02-24

## 2022-02-24 RX ORDER — HEPARIN SODIUM 5000 [USP'U]/ML
5000 INJECTION, SOLUTION INTRAVENOUS; SUBCUTANEOUS ONCE
Status: CANCELLED | OUTPATIENT
Start: 2022-02-24 | End: 2022-02-24

## 2022-02-24 RX ORDER — ACETAMINOPHEN 325 MG/1
650 TABLET ORAL ONCE
Status: CANCELLED | OUTPATIENT
Start: 2022-02-24 | End: 2022-02-24

## 2022-02-24 NOTE — PROGRESS NOTES
Kenisha aJma  [id number]  1945      Reason for visit:  Endometrial cancer, lung recurrence.    Consultation:  Patient is being seen at the request of Dr. Trevino    History of present illness:  The patient is a 76 y.o. year old female who presents today for treatment and evaluation of the above issues.    Patient's case was discussed with Dr. Trevino.  She was presented at breast cancer tumor board the day before being evaluated with me.  At the completion of the discussion, Dr. Nickolas Ortega, Dr. Trevino, Dr. Reed, and I all agreed that the PET/CT findings in the abdominal cavity were suspicious for recurrence and that further intra-abdominal evaluation was indicated.  She is asymptomatic from a pulmonary standpoint.    Oncologic History:  Oncology/Hematology History   Endometrial cancer (HCC)   9/30/2011 Imaging    TVUS and CT scan for palpable left pelvic mass upon annual exam and new abdominal symptoms.      10/6/2011 Surgery    ADY/BSO with pelvic lymph node dissection. Stage 1A grade 2 endometrial adenocarcinoma by final pathology     1/26/2022 Imaging    IMPRESSION:     Hypermetabolic soft tissue nodule in the left breast compatible with  biopsy-proven invasive ductal carcinoma. There is diffuse low level FDG  uptake in the region of recent right breast biopsy site. A  hypermetabolic mass in the left lower lobe is suspicious for pulmonary  metastasis. No additional sites of metastases are identified. There is  no evidence of discrete/focal hypermetabolic lesion of the sternum to  correspond with the indeterminant sternal lesion described on breast MRI  exam.     2/14/2022 Biopsy    Final Diagnosis   LEFT LUNG, BIOPSY:  Metastatic adenocarcinoma.  See comment.  [facility]   Electronically signed by Wolfgang Hair MD on 2/22/2022 at 1154   Comment    Sections show small fragments of tumor that are morphologically distinct from the patient's known breast cancers.  Immunoprofile suggests  mullerian origin.  Clinical correlation required.  This case was shown for intradepartmental consultation and the other pathologist concurs with the findings interpretation.  This case was discussed with Dr. Trevino on 02/21/22.  This case was discussed with Dr. Reed on 02/22/22.        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  Right breast cancer-invasive ductal carcinoma grade 2-ER negative DC negative HER-2 negative    2.  Left breast cancer-invasive ductal carcinoma grade 2-ER positive DC negative HER-2 negative     1/26/2022 Imaging    EXAMINATION: NM PET/CT SKULL BASE TO MID THIGH      FINDINGS:      Today's 3-D images demonstrate focal hypermetabolism in the soft tissues  of the left breast as well as focal hypermetabolism within the left  midlung.     Multiplanar images of the head and neck demonstrate symmetric FDG uptake  within the brain. There is no evidence of hypermetabolic neck mass or  lymph node.     In the upper outer quadrant of the left breast there is redemonstration  of a irregular 2.1 cm soft tissue nodule with FDG hypermetabolism of SUV  max 4.2, compatible with biopsy-proven invasive ductal carcinoma. There  is an ill-defined diffuse region of low-level hypermetabolism in the  right breast with adjacent biopsy clip and single locule of soft tissue  air, compatible with recent right breast biopsy. A discrete  hypermetabolic nodule or mass in the right breast is not confidently  identified. There is no hypermetabolic or enlarged axillary lymph nodes.     Within the chest there is redemonstration of a lobulated soft tissue  mass in the superior aspect of the left lower lobe which appears  hypermetabolic with SUV max 6.5. There is no evidence of  hypermetabolic  mediastinal or hilar adenopathy.     Below the diaphragm there is expected physiologic uptake within the   and GI tracts. No evidence of abdominal pelvic malignancy or metastasis.  No hypermetabolic abdominopelvic lymph nodes. Photopenic left renal cyst  is noted.     There is no hypermetabolic pathologic marrow process. Specifically,  there is no evidence of hypermetabolic lesion of the sternum to  correspond with the indeterminant sternal lesion detailed on prior  breast MRI exam. FDG uptake at the left antecubital fossa reflects site  of IV administration.     IMPRESSION:     Hypermetabolic soft tissue nodule in the left breast compatible with  biopsy-proven invasive ductal carcinoma. There is diffuse low level FDG  uptake in the region of recent right breast biopsy site. A  hypermetabolic mass in the left lower lobe is suspicious for pulmonary  metastasis. No additional sites of metastases are identified. There is  no evidence of discrete/focal hypermetabolic lesion of the sternum to  correspond with the indeterminant sternal lesion described on breast MRI  exam.                 Past Medical History:   Diagnosis Date   • Dyslipidemia    • Heart disease    • Hemorrhoids    • Hypertension    • Kidney disease    • Seasonal allergies        Past Surgical History:   Procedure Laterality Date   • BREAST BIOPSY Left 11/2021    o/s u/s   • BREAST CYST EXCISION      x3. Benign   • CORONARY ANGIOPLASTY WITH STENT PLACEMENT     • URETERAL STENT INSERTION         MEDICATIONS: The current medication list was reviewed with the patient and updated in the EMR this date per the Medical Assistant. Medication dosages and frequencies were confirmed to be accurate.      Allergies:  has No Known Allergies.    Social History:   Social History     Socioeconomic History   • Marital status:    Tobacco Use   • Smoking status: Never Smoker   • Smokeless tobacco: Never Used   Substance and Sexual Activity   •  "Alcohol use: Yes     Comment: Rare   • Drug use: No   • Sexual activity: Yes     Partners: Male     Comment:         Family History:    Family History   Problem Relation Age of Onset   • Heart disease Mother    • Diabetes Mother    • Skin cancer Father    • Colon cancer Father    • Heart disease Father    • Breast cancer Neg Hx    • Ovarian cancer Neg Hx        Health Maintenance:    Health Maintenance   Topic Date Due   • COVID-19 Vaccine (1) Never done   • Pneumococcal Vaccine 65+ (1 of 2 - PPSV23) Never done   • Hepatitis B (1 of 3 - Risk 3-dose series) Never done   • TDAP/TD VACCINES (2 - Tdap) 03/27/2007   • DXA SCAN  11/09/2017   • COLORECTAL CANCER SCREENING  11/09/2020   • HEPATITIS C SCREENING  Never done   • ANNUAL WELLNESS VISIT  Never done   • MAMMOGRAM  01/24/2023   • INFLUENZA VACCINE  Completed   • ZOSTER VACCINE  Completed     Review of Systems  Please refer to the above history of present illness  Physical Exam    Vitals:    02/24/22 1256   BP: 172/81   Pulse: 60   Resp: 17   SpO2: 98%   Weight: 73.9 kg (162 lb 14.4 oz)   Height: 160 cm (62.99\")   PainSc: 0-No pain       Body mass index is 28.86 kg/m².    Wt Readings from Last 3 Encounters:   02/24/22 73.9 kg (162 lb 14.4 oz)   02/17/22 73 kg (161 lb)   01/27/22 72.4 kg (159 lb 11.2 oz)     GENERAL: Alert, well-appearing female appearing her stated age who is in no apparent distress.   HEENT: Sclera anicteric. Head normocephalic, atraumatic. Mucus membranes moist.   NECK: Trachea midline, supple, without masses.  No thyromegaly.   BREASTS: Deferred  CARDIOVASCULAR: Normal rate, regular rhythm, no murmurs, rubs, or gallops.  No peripheral edema.  RESPIRATORY: Clear to auscultation bilaterally, normal respiratory effort  BACK:  No CVA tenderness, no vertebral tenderness on palpation  GASTROINTESTINAL:  Abdomen is soft, non-tender, non-distended, no rebound or guarding, no masses, or hernias. No HSM.    SKIN:  Warm, dry, well-perfused.  All " visible areas intact.  No rashes, lesions, ulcers.  PSYCHIATRIC: AO x3, with appropriate affect, normal thought processes.  NEUROLOGIC: No focal deficits.  Moves extremities well.  MUSCULOSKELETAL: Normal gait and station.   EXTREMITIES:   No cyanosis, clubbing, symmetric.  LYMPHATICS:  No cervical or inguinal adenopathy noted.     PELVIC exam:    Deferred    ECOG PS 0    PROCEDURES:  none    Diagnostic Data:    See my review of PET/CT images above.     CT Chest With Contrast Diagnostic    Result Date: 1/10/2022  CT demonstrates no specific osseous correlate to the sternal lesion noted on recent MRI. No corresponding lytic or sclerotic osseous lesion is present. There is no evidence of cortical breakthrough.  An irregular lobulated homogeneous 3.7 x 2.1 cm nodule is noted along the fissure the left lung base. Findings remain suspicious for metastatic involvement, however granulomatous process or possibly pulmonary AVM could have similar appearance. PET/CT would be useful to characterize but this finding and further evaluate for possible CT occult sternal osseous metastasis.  Redemonstration of bilateral soft tissue breast masses concerning for malignancy, better characterized on recent MRI.  This report was finalized on 1/10/2022 3:34 PM by Rodríguez Bacon.      XR Chest 1 View    Result Date: 2/14/2022  No pneumothorax.  This report was finalized on 2/14/2022 2:29 PM by Rodríguez Bacon.      XR Chest 1 View    Result Date: 2/14/2022  Left lower lobe pulmonary nodule faintly seen. There is no pneumothorax or other evident immediate complication. Unchanged heart and mediastinal contours.  This report was finalized on 2/14/2022 1:06 PM by Rodríguez Bacon.      CT Needle Biopsy Lung    Result Date: 2/16/2022  Impression:                                                              Successful CT guided core biopsy of a mass in the left lobe of the lung as described above.  Thank you for the opportunity to assist in  "the care of your patient.  This report was finalized on 2/16/2022 8:39 AM by Geovani Wilkins MD.      Mammo Post Clip Placement Right    Result Date: 1/27/2022  Findings highly suggestive of malignancy at the 11:00 position of the right breast both mammographically and sonographically.   BI-RADS CATEGORY:  5, HIGHLY SUGGESTIVE OF MALIGNANCY   RECOMMENDATION:  Ultrasound-guided biopsy  CAD was utilized.     10G ELEVATION VACUUM-ASSISTED ULTRASOUND GUIDED CORE BIOPSY    History: Findings highly suggestive of malignancy at the right 11-11 30 position  Procedure: Written and verbal consent was obtained for ultrasound guided core biopsy of a 2 to 3 cm centimeter ill-defined spiculated area corresponding to MRI nonmass enhancement at the right 11-11 30 position \" Time out\" was observed to verify the patient's identity and correct location of the breast abnormality. The presence of the lesion was confirmed ultrasound and a lateral approach was chosen. The breast was prepped and draped in the usual sterile fashion and 10 mL 1% lidocaine with and without epinephrine was utilized for local anesthesia. A small skin incision was made with a scalpel and a 10-gauge needle with an overlying sheath attached to the core biopsy device was introduced into the breast under direct sonographic guidance. The needle was placed within to the mass. A total of 6 core samples were obtained. The specimens were placed in formalin and forwarded to the pathology department. A post biopsy marking clip was placed. Post biopsy mammographic images were obtained. The clip was noted to be in excellent position in the anterior aspect of the 2 adjacent areas of nonmass enhancement seen by MRI  Upon completion of the procedure, compression was applied to the biopsy site until all appreciable bleeding subsided and a sterile dressing was applied. Post biopsy instructions were reviewed with the patient by our clinical breast imaging staff. A written copy of " these instructions was also given to the patient. The patient tolerated the procedure well and no immediate complications occurred.   SUMMARY: 10-gauge ultrasound guided and vacuum assisted core biopsy of a 2-3 cm right breast mass located at the 11-11 30 position.  A post biopsy marking clip was placed.  PATHOLOGY: Invasive ductal carcinoma ER negative, [place] negative, HER-2/mary equivocal. Findings are concordant. The patient will be referred to back to [facility]  This report was finalized on 1/27/2022 11:40 AM by Dr. Svetlana Juárez MD.      MRI Breast Bilateral Diagnostic With & Without Contrast    Result Date: 12/30/2021  1. Known biopsy-proven malignancy on the left 2. Findings highly suggestive of malignancy on the right as described 3. Indeterminate sternal lesion measuring almost 2 cm with questionable expansion/erosion of the anterior sternal cortex. Dedicated bone imaging is recommended.  RECOMMENDATIONS: 1. Diagnostic right mammography and sonography for evaluation for biopsy of the adjacent areas of concern in the right upper outer quadrant. 2. Dedicated bone evaluation of the sternal lesion.  BI-RADS CATEGORY: 5, HIGHLY SUGGESTIVE OF MALIGNANCY  FINAL MRI RESULTS AND RECOMMENDATIONS WILL BE CALLED TO THE PATIENT BY A BREAST CARE NURSE.  This report was finalized on 12/30/2021 11:48 AM by Dr. Svetlana Juárez MD.      NM PET/CT Skull Base to Mid Thigh    Result Date: 1/26/2022   Hypermetabolic soft tissue nodule in the left breast compatible with biopsy-proven invasive ductal carcinoma. There is diffuse low level FDG uptake in the region of recent right breast biopsy site. A hypermetabolic mass in the left lower lobe is suspicious for pulmonary metastasis. No additional sites of metastases are identified. There is no evidence of discrete/focal hypermetabolic lesion of the sternum to correspond with the indeterminant sternal lesion described on breast MRI exam.   This report was finalized on 1/26/2022  "3:33 PM by Anil Saha MD.      Mammo Diagnostic Digital Tomosynthesis Right With CAD    Result Date: 1/27/2022  Findings highly suggestive of malignancy at the 11:00 position of the right breast both mammographically and sonographically.   BI-RADS CATEGORY:  5, HIGHLY SUGGESTIVE OF MALIGNANCY   RECOMMENDATION:  Ultrasound-guided biopsy  CAD was utilized.     10G ELEVATION VACUUM-ASSISTED ULTRASOUND GUIDED CORE BIOPSY    History: Findings highly suggestive of malignancy at the right 11-11 30 position  Procedure: Written and verbal consent was obtained for ultrasound guided core biopsy of a 2 to 3 cm centimeter ill-defined spiculated area corresponding to MRI nonmass enhancement at the right 11-11 30 position \" Time out\" was observed to verify the patient's identity and correct location of the breast abnormality. The presence of the lesion was confirmed ultrasound and a lateral approach was chosen. The breast was prepped and draped in the usual sterile fashion and 10 mL 1% lidocaine with and without epinephrine was utilized for local anesthesia. A small skin incision was made with a scalpel and a 10-gauge needle with an overlying sheath attached to the core biopsy device was introduced into the breast under direct sonographic guidance. The needle was placed within to the mass. A total of 6 core samples were obtained. The specimens were placed in formalin and forwarded to the pathology department. A post biopsy marking clip was placed. Post biopsy mammographic images were obtained. The clip was noted to be in excellent position in the anterior aspect of the 2 adjacent areas of nonmass enhancement seen by MRI  Upon completion of the procedure, compression was applied to the biopsy site until all appreciable bleeding subsided and a sterile dressing was applied. Post biopsy instructions were reviewed with the patient by our clinical breast imaging staff. A written copy of these instructions was also given to the " "patient. The patient tolerated the procedure well and no immediate complications occurred.   SUMMARY: 10-gauge ultrasound guided and vacuum assisted core biopsy of a 2-3 cm right breast mass located at the 11-11 30 position.  A post biopsy marking clip was placed.  PATHOLOGY: Invasive ductal carcinoma ER negative, OR negative, HER-2/mary equivocal. Findings are concordant. The patient will be referred to back to [facility]  This report was finalized on 1/27/2022 11:40 AM by Dr. Svetlana Juárez MD.      US Guided Breast Biopsy With & Without Device initial    Result Date: 1/27/2022  Findings highly suggestive of malignancy at the 11:00 position of the right breast both mammographically and sonographically.   BI-RADS CATEGORY:  5, HIGHLY SUGGESTIVE OF MALIGNANCY   RECOMMENDATION:  Ultrasound-guided biopsy  CAD was utilized.     10G ELEVATION VACUUM-ASSISTED ULTRASOUND GUIDED CORE BIOPSY    History: Findings highly suggestive of malignancy at the right 11-11 30 position  Procedure: Written and verbal consent was obtained for ultrasound guided core biopsy of a 2 to 3 cm centimeter ill-defined spiculated area corresponding to MRI nonmass enhancement at the right 11-11 30 position \" Time out\" was observed to verify the patient's identity and correct location of the breast abnormality. The presence of the lesion was confirmed ultrasound and a lateral approach was chosen. The breast was prepped and draped in the usual sterile fashion and 10 mL 1% lidocaine with and without epinephrine was utilized for local anesthesia. A small skin incision was made with a scalpel and a 10-gauge needle with an overlying sheath attached to the core biopsy device was introduced into the breast under direct sonographic guidance. The needle was placed within to the mass. A total of 6 core samples were obtained. The specimens were placed in formalin and forwarded to the pathology department. A post biopsy marking clip was placed. Post " biopsy mammographic images were obtained. The clip was noted to be in excellent position in the anterior aspect of the 2 adjacent areas of nonmass enhancement seen by MRI  Upon completion of the procedure, compression was applied to the biopsy site until all appreciable bleeding subsided and a sterile dressing was applied. Post biopsy instructions were reviewed with the patient by our clinical breast imaging staff. A written copy of these instructions was also given to the patient. The patient tolerated the procedure well and no immediate complications occurred.   SUMMARY: 10-gauge ultrasound guided and vacuum assisted core biopsy of a 2-3 cm right breast mass located at the 11-11 30 position.  A post biopsy marking clip was placed.  PATHOLOGY: Invasive ductal carcinoma ER negative, [place] negative, HER-2/mary equivocal. Findings are concordant. The patient will be referred to back to [facility]  This report was finalized on 1/27/2022 11:40 AM by Dr. Svetlana Juárez MD.        Lab Results   Component Value Date    WBC 7.70 02/14/2022    HGB 12.8 02/14/2022    HCT 38.5 02/14/2022    MCV 92.3 02/14/2022     02/14/2022    NEUTROABS 5.38 02/14/2022    GLUCOSE 128 (H) 02/14/2022    BUN 17 02/14/2022    CREATININE 0.82 02/14/2022    EGFRIFNONA 68 02/14/2022     (L) 02/14/2022    K 4.6 02/14/2022    CL 99 02/14/2022    CO2 23.0 02/14/2022    CALCIUM 9.6 02/14/2022     No results found for:         Assessment/Plan   This is a 76 y.o. woman with recurrent endometrial cancer and what appears to be an isolated lung metastasis, findings concerning on imaging for intra-abdominal disease as well  Encounter Diagnoses   Name Primary?   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   If patient has negative evaluation, she may be a candidate for [facility].  Otherwise, we would consider management of the breast cancer along with systemic chemotherapy for recurrent endometrial cancer.  Patient understands the need for further evaluation which would impact her care.  She is anxious to pursue next steps.  Patient was consented for diagnostic laparoscopy, possible biopsies.      Risks and benefits of surgery were discussed.  This included, but was not limited to, infection and bleeding like when the skin is cut; damage to surrounding structures; and incisional complications.  Risk of DVT was addressed for major surgeries.  Standard of care efforts to minimize these risks were reviewed.  Typical hospital stay and recovery were discussed as well as post-procedure precautions.  Surgical implications of chronic illnesses on recovery and surgical outcome were reviewed.     Pain medication regimen for postoperative care was discussed.  Typical regimen and avoidance of narcotics was discussed.  Patient was educated that other factors, such as existing narcotic use, can impact postoperative pain management.      Patient verbalized understanding of the plan including the risks and benefits.  Appropriate perioperative testing including laboratory evaluation, EKG as clinically indicated, chest x-ray as clinically indicated, and preadmission evaluation were all ordered as a part of this patient's care.  Pain assessment was performed today as a part of patient’s care.  For patients with pain related to surgery, gynecologic malignancy or cancer treatment, the plan is as noted in the assessment/plan.  For patients with pain not related to these issues, they are to seek any further needed care from a more appropriate provider, such as PCP.      Orders Placed This Encounter   Procedures   • COVID PRE-OP / PRE-PROCEDURE SCREENING ORDER (NO ISOLATION) - Swab, Nasopharynx     Standing Status:   Future     Standing  Expiration Date:   2/24/2023     Order Specific Question:   Please select your location:     Answer:   [facility]     Order Specific Question:   COVID Screening Order:     Answer:   In-House: PRE-OP: SHANQIUA APTIMA PANTHER 24HR TAT [[id number]]     Order Specific Question:   Previously tested for COVID-19?     Answer:   Unknown     Order Specific Question:   Employed in healthcare setting?     Answer:   No     Order Specific Question:   Symptomatic for COVID-19 as defined by CDC?     Answer:   No     Order Specific Question:   Hospitalized for COVID-19?     Answer:   No     Order Specific Question:   Admitted to ICU for COVID-19?     Answer:   No     Order Specific Question:   Resident in a congregate (group) care setting?     Answer:   No     Order Specific Question:   Pregnant?     Answer:   No     Order Specific Question:   Release to patient     Answer:   Immediate   • Comprehensive Metabolic Panel     Standing Status:   Future     Standing Expiration Date:   2/24/2023     Order Specific Question:   Release to patient     Answer:   Immediate   • Verify NPO Status     Standing Status:   Future   • Obtain Informed Consent     Standing Status:   Future     Order Specific Question:   Informed Consent Given For     Answer:   DIAGNOSTIC LAPAROSCOPY, WITH POSSIBLE BIOPSIES   • Provide Instructions to Patient on NPO Status     Standing Status:   Future   • Chlorhexidine Skin Prep     Chlorhexidine Skin Prep and Instructions For All Patients Having A Procedure Requiring an Outward Incision if Not Allergic. If Allergic, Give Antibacterial Skin Wipes and Instructions. Do Not Use For Facial Cases or on Any Mucus Membranes.     Standing Status:   Future   • Instruct Patient on Coughing, Deep Breathing & Incentive Spirometry     Standing Status:   Future   • ECG 12 Lead     Standing Status:   Future     Standing Expiration Date:   2/24/2023     Order Specific Question:   Reason for Exam:     Answer:   PRE OP     Order Specific  Question:   Release to patient     Answer:   Immediate   • CBC & Differential     Standing Status:   Future     Standing Expiration Date:   2/24/2023     Order Specific Question:   Manual Differential     Answer:   No     Order Specific Question:   Release to patient     Answer:   Immediate       FOLLOW UP: Surgery    Electronically Signed by: Elizabeth Pedroza MD  Date: 2/25/2022

## 2022-02-24 NOTE — TELEPHONE ENCOUNTER
Call Dr. Marquez office. They will see patient on Monday 2/28/22 100 for cardiac clearance Fax #[phone]

## 2022-02-25 PROBLEM — R91.8 MASS OF LEFT LUNG: Status: ACTIVE | Noted: 2022-02-25

## 2022-03-02 ENCOUNTER — PATIENT EDUCATION (SURGERY INSTRUCTIONS) (OUTPATIENT)
Dept: GYNECOLOGIC ONCOLOGY | Facility: CLINIC | Age: 77
End: 2022-03-02

## 2022-03-02 ENCOUNTER — ANESTHESIA EVENT (OUTPATIENT)
Dept: PERIOP | Facility: HOSPITAL | Age: 77
End: 2022-03-02

## 2022-03-02 ENCOUNTER — OFFICE VISIT (OUTPATIENT)
Dept: ONCOLOGY | Facility: CLINIC | Age: 77
End: 2022-03-02

## 2022-03-02 ENCOUNTER — CLINICAL SUPPORT NO REQUIREMENTS (OUTPATIENT)
Dept: PREADMISSION TESTING | Facility: HOSPITAL | Age: 77
End: 2022-03-02

## 2022-03-02 VITALS
SYSTOLIC BLOOD PRESSURE: 149 MMHG | HEART RATE: 67 BPM | DIASTOLIC BLOOD PRESSURE: 80 MMHG | BODY MASS INDEX: 28.37 KG/M2 | WEIGHT: 160.1 LBS | TEMPERATURE: 97.1 F | OXYGEN SATURATION: 97 % | HEIGHT: 63 IN | RESPIRATION RATE: 16 BRPM

## 2022-03-02 DIAGNOSIS — C50.411 MALIGNANT NEOPLASM OF UPPER-OUTER QUADRANT OF BOTH BREASTS IN FEMALE, ESTROGEN RECEPTOR POSITIVE: ICD-10-CM

## 2022-03-02 DIAGNOSIS — Z01.812 ENCOUNTER FOR PRE-OPERATIVE LABORATORY TESTING: Primary | ICD-10-CM

## 2022-03-02 DIAGNOSIS — Z17.0 MALIGNANT NEOPLASM OF UPPER-OUTER QUADRANT OF BOTH BREASTS IN FEMALE, ESTROGEN RECEPTOR POSITIVE: Primary | ICD-10-CM

## 2022-03-02 DIAGNOSIS — C50.411 MALIGNANT NEOPLASM OF UPPER-OUTER QUADRANT OF BOTH BREASTS IN FEMALE, ESTROGEN RECEPTOR POSITIVE: Primary | ICD-10-CM

## 2022-03-02 DIAGNOSIS — Z17.0 MALIGNANT NEOPLASM OF UPPER-OUTER QUADRANT OF BOTH BREASTS IN FEMALE, ESTROGEN RECEPTOR POSITIVE: ICD-10-CM

## 2022-03-02 DIAGNOSIS — C54.1 ENDOMETRIAL CANCER: ICD-10-CM

## 2022-03-02 DIAGNOSIS — C50.412 MALIGNANT NEOPLASM OF UPPER-OUTER QUADRANT OF BOTH BREASTS IN FEMALE, ESTROGEN RECEPTOR POSITIVE: Primary | ICD-10-CM

## 2022-03-02 DIAGNOSIS — C50.412 MALIGNANT NEOPLASM OF UPPER-OUTER QUADRANT OF BOTH BREASTS IN FEMALE, ESTROGEN RECEPTOR POSITIVE: ICD-10-CM

## 2022-03-02 LAB — SARS-COV-2 RNA PNL SPEC NAA+PROBE: NOT DETECTED

## 2022-03-02 PROCEDURE — 99214 OFFICE O/P EST MOD 30 MIN: CPT | Performed by: INTERNAL MEDICINE

## 2022-03-02 PROCEDURE — U0004 COV-19 TEST NON-CDC HGH THRU: HCPCS

## 2022-03-02 PROCEDURE — C9803 HOPD COVID-19 SPEC COLLECT: HCPCS

## 2022-03-02 RX ORDER — FAMOTIDINE 10 MG/ML
20 INJECTION, SOLUTION INTRAVENOUS ONCE
Status: CANCELLED | OUTPATIENT
Start: 2022-03-02 | End: 2022-03-02

## 2022-03-02 NOTE — PATIENT INSTRUCTIONS
"  Laparoscopic Surgery Instructions            Kenisha Jama  [id number]  1945      SURGEON:  Elizabeth Pedroza MD    Surgery Coordinator: Kelsea   If you have any questions before or after surgery please call.  [phone]        Appointment      2. You have pre-admission testing (PAT) appointment on 03/02/2022  at 10:30 am   You will need to be at hospital registration 10 minutes before that time. The registration department is located in the long hallway between the [facility] and [address] [place] [address]. This is on the first floor of the [facility] hospital you can enter through the [address] [place] [address].      3.  Your surgery has been scheduled on 03/03/2022.  You will need to be at the [address] [place] [address] second floor surgery registration on that day at 08:00 am    The Day(s) Before Surgery     ·  Nothing by mouth after midnight on 03/02/2022    · Plan to have someone take you home from the hospital.    · Do not use any products that contain nicotine or tobacco, such as cigarettes and e-cigarettes. These can delay healing after surgery. If you need help quitting, ask your health care provider.    ·  Do not take vitamins or aspirin one week before surgery ( if applicable). Do not take Ibuprofen or NSAIDs 5 days prior to Surgery. Do not take ACE or ARB medications for blood pressure the morning of surgery. If you are taking insulin, take 1/2 dose insulin the night prior to surgery.   Bring them with you to the hospital (Diabetic patient should bring insulin if instructed by the managing physician). If you are taking a blood thinner ( Eliquis, Coumadin, Xarelto, Lovenox, Asprin, Heparin, etc.) please have the provider that manages this instruct you on when to stop taking prior to surgery.     · Continue antidepressants, Beta Blockers \"olol\", anti-seizure medication, GERD medication (heartburn), Opioids and Parkinson's medication.     · Suboxone and Phentermine needs to stopped 7 days prior to surgery.      · If you are feeling sick, have a " fever or cough and have seen your PCP let our office know 48 hours prior to surgery. It may be subject to rescheduling.         The Day of Surgery:    Do not chew gum or tobacco, smoke, or eat mints or hard candy. Shower and wash your hair. You may brush your teeth but  do not swallow water. Use any wipes that Pre-admission testing has given you.     Please arrive for surgery as instructed by the pre-op nurse, often one to two hours before your surgery.    • Remove all jewelry, including rings and piercings. Do not bring valuables to the hospital.    • Wear loose-fitting clothing.    • Avoid wearing eye makeup or contact lenses    • Please remember to bring:  ? Photo ID and medical insurance card  ? Advanced directives, living will or power of  (if applicable)  ? Current list of all medications, including over-the- counter and herbal supplements  ? List of allergies  ? CPAP device if you have sleep apnea  ? Any assistive devices or equipment needed after surgery  ? Books/magazines to pass the time    • When you arrive, check in at the surgery registration desk on the second floor of the [address] [place] [address].     • Once you are called to go to your pre-op room, no one will be allowed in the pre op room.     Please note no one under age 12 is permitted to stay in the waiting area without supervision.    We make every effort to begin surgery at your scheduled start time but delays do occur. We will keep you and your family  updated about any delays.    While You are In the Pre-Op Room:  • The nurse will review your health history and will place an IV (into the vein) in your hand or arm for fluids and  medicines.  • An anesthesia provider will talk with you about anesthesia and pain control during and after surgery.  • A member of the surgical team can answer your questions.             What can I expect after the procedure?    After Surgery and/or Discharge:    After surgery you will be moved to the recovery area.  Recovery and discharge times will vary depending on the procedure.    The recovery room nurse will provide your family/visitors with updates. Visitors are not permitted in the immediate postoperative recovery area, but your visitors will be notified when they can come to see you after surgery.    If you will not be admitted to the hospital, your nurse will assess your readiness to go home. This includes your:    • Ability to walk, use crutches, etc.  • Ability to urinate adequate amounts  • Nausea and pain control    Before discharge, you will receive written instructions about how to care for yourself at home along with any prescriptions  for medications.     For your safety, you will need a responsible adult age 18 or older to accompany you home.     Please have a ride arranged and available when you are ready to leave. You cannot leave alone and it is recommended someone stay with you for the first 24 hours after anesthesia.       After the procedure, it is common to have:    · Pain.  · Soreness and numbness in your incision areas.  · Constipation.  · Temporary change in bladder function.  · Feelings of sadness or other emotions.  · Small amounts of clear drainage from incisions  · If you are discharged with an abdominal binder, this is to be used as needed for incisional comfort. You may choose to discontinue use at any time.           Follow these instructions at home:  Medicines    · Please take any medications that have been prescribed after your surgery, you may take over the counter Tylenol and Ibuprofen, unless told otherwise by your provider.   · Please take your stool softener as prescribed. If you do not have a bowel movement within 24 hours following surgery try a laxative (milk of magnesia) or you may take Inez-Lax.    Activity    · Return to your normal activities as told by your health care provider.   · You may be told to take short walks every day and go farther each time.  · Do not lift anything  that is heavier than 20 lbs.      General instructions    · Do not have sex until your health care provider says you can.  · Do not take baths, swim, or use a hot tub until your health care provider approves.  · Drink enough fluid to keep your urine clear or pale yellow.  · Do not drive for 24 hours if you were given a sedative.  · Do not drive while taking Narcotic Pain medication ( oxycodone, hydrocodone, etc.).   · Keep all follow-up visits as told by your health care provider. This is important. You will have a post op appointment typically 3 weeks after surgery.  · Make sure you are urinating on a scheduled basis, for example every 2 hours. This will help retrain your bladder after surgery and prevent urinary tract infections.     Contact a health care provider if:    · Your pain medicine is not helping.  · You have a fever over 101.0  · You have redness, swelling, or pain at your incision site.  · You continue to have difficulty urinating.  · You have not had a bowel movement 2-3 days after surgery, experience nausea and vomiting, are unable to pass gas.   · Large volumes of drainage or blood from incisions, requiring multiple guaze changes.     Get help right away if:    · You have severe abdominal or back pain that is not controlled with medication.      If you experience a medical emergency call 911 or have someone drive you to your nearest emergency department.         Parking Information:    Free parking is available for patients and guests. There is  parking at the [address] in front of the hospital. Parking is also available in the [facility] and [facility].             Financial Assistance:    If you have any questions or need assistance, contact your [facility] financial counseling office from 8:30 a.m.-4:30  p.m. Monday through Friday. Closed weekends.    •  [place]: [phone] or, or visit at [address] [facility], near the entrance.        Patient Payments and  Correspondence    Customer service representatives are available to assist you from 8:00 a.m. to 6:00 p.m. Eastern Standard Time by calling [phone] Monday through Friday. You can also contact us through [employer].    [facility]  [address]  [place] [address] [phone]

## 2022-03-02 NOTE — PATIENT INSTRUCTIONS
"              Laparoscopic Surgery Instructions            Kenisha Jama  [id number]  1945      SURGEON:  Elizabeth Pedroza MD    Surgery Coordinator: Kelsea   If you have any questions before or after surgery please call.  [phone]        Appointment      2. You have pre-admission testing (PAT) appointment on 03/02/2022  at 10:30 am   You will need to be at hospital registration 10 minutes before that time. The registration department is located in the long hallway between the [facility] and [address] [place] [address]. This is on the first floor of the [facility] hospital you can enter through the [address] [place] [address].      3.  Your surgery has been scheduled on 03/03/2022.  You will need to be at the [address] [place] [address] second floor surgery registration on that day at 08:00 am    The Day(s) Before Surgery     ·  Nothing by mouth after midnight on 03/02/2022    · Plan to have someone take you home from the hospital.    · Do not use any products that contain nicotine or tobacco, such as cigarettes and e-cigarettes. These can delay healing after surgery. If you need help quitting, ask your health care provider.    ·  Do not take vitamins or aspirin one week before surgery ( if applicable). Do not take Ibuprofen or NSAIDs 5 days prior to Surgery. Do not take ACE or ARB medications for blood pressure the morning of surgery. If you are taking insulin, take 1/2 dose insulin the night prior to surgery.   Bring them with you to the hospital (Diabetic patient should bring insulin if instructed by the managing physician). If you are taking a blood thinner ( Eliquis, Coumadin, Xarelto, Lovenox, Asprin, Heparin, etc.) please have the provider that manages this instruct you on when to stop taking prior to surgery.     · Continue antidepressants, Beta Blockers \"olol\", anti-seizure medication, GERD medication (heartburn), Opioids and Parkinson's medication.     · Suboxone and Phentermine needs to stopped 7 days prior to surgery.      · If you are feeling " sick, have a fever or cough and have seen your PCP let our office know 48 hours prior to surgery. It may be subject to rescheduling.         The Day of Surgery:    Do not chew gum or tobacco, smoke, or eat mints or hard candy. Shower and wash your hair. You may brush your teeth but  do not swallow water. Use any wipes that Pre-admission testing has given you.     Please arrive for surgery as instructed by the pre-op nurse, often one to two hours before your surgery.    • Remove all jewelry, including rings and piercings. Do not bring valuables to the hospital.    • Wear loose-fitting clothing.    • Avoid wearing eye makeup or contact lenses    • Please remember to bring:  ? Photo ID and medical insurance card  ? Advanced directives, living will or power of  (if applicable)  ? Current list of all medications, including over-the- counter and herbal supplements  ? List of allergies  ? CPAP device if you have sleep apnea  ? Any assistive devices or equipment needed after surgery  ? Books/magazines to pass the time    • When you arrive, check in at the surgery registration desk on the second floor of the [address] [place] [address].     • Once you are called to go to your pre-op room, no one will be allowed in the pre op room.     Please note no one under age 12 is permitted to stay in the waiting area without supervision.    We make every effort to begin surgery at your scheduled start time but delays do occur. We will keep you and your family  updated about any delays.    While You are In the Pre-Op Room:  • The nurse will review your health history and will place an IV (into the vein) in your hand or arm for fluids and  medicines.  • An anesthesia provider will talk with you about anesthesia and pain control during and after surgery.  • A member of the surgical team can answer your questions.             What can I expect after the procedure?    After Surgery and/or Discharge:    After surgery you will be moved to the  recovery area. Recovery and discharge times will vary depending on the procedure.    The recovery room nurse will provide your family/visitors with updates. Visitors are not permitted in the immediate postoperative recovery area, but your visitors will be notified when they can come to see you after surgery.    If you will not be admitted to the hospital, your nurse will assess your readiness to go home. This includes your:    • Ability to walk, use crutches, etc.  • Ability to urinate adequate amounts  • Nausea and pain control    Before discharge, you will receive written instructions about how to care for yourself at home along with any prescriptions  for medications.     For your safety, you will need a responsible adult age 18 or older to accompany you home.     Please have a ride arranged and available when you are ready to leave. You cannot leave alone and it is recommended someone stay with you for the first 24 hours after anesthesia.       After the procedure, it is common to have:    · Pain.  · Soreness and numbness in your incision areas.  · Constipation.  · Temporary change in bladder function.  · Feelings of sadness or other emotions.  · Small amounts of clear drainage from incisions  · If you are discharged with an abdominal binder, this is to be used as needed for incisional comfort. You may choose to discontinue use at any time.           Follow these instructions at home:  Medicines    · Please take any medications that have been prescribed after your surgery, you may take over the counter Tylenol and Ibuprofen, unless told otherwise by your provider.   · Please take your stool softener as prescribed. If you do not have a bowel movement within 24 hours following surgery try a laxative (milk of magnesia) or you may take Inez-Lax.    Activity    · Return to your normal activities as told by your health care provider.   · You may be told to take short walks every day and go farther each time.  · Do not  lift anything that is heavier than 20 lbs.      General instructions    · Do not have sex until your health care provider says you can.  · Do not take baths, swim, or use a hot tub until your health care provider approves.  · Drink enough fluid to keep your urine clear or pale yellow.  · Do not drive for 24 hours if you were given a sedative.  · Do not drive while taking Narcotic Pain medication ( oxycodone, hydrocodone, etc.).   · Keep all follow-up visits as told by your health care provider. This is important. You will have a post op appointment typically 3 weeks after surgery.  · Make sure you are urinating on a scheduled basis, for example every 2 hours. This will help retrain your bladder after surgery and prevent urinary tract infections.     Contact a health care provider if:    · Your pain medicine is not helping.  · You have a fever over 101.0  · You have redness, swelling, or pain at your incision site.  · You continue to have difficulty urinating.  · You have not had a bowel movement 2-3 days after surgery, experience nausea and vomiting, are unable to pass gas.   · Large volumes of drainage or blood from incisions, requiring multiple guaze changes.     Get help right away if:    · You have severe abdominal or back pain that is not controlled with medication.      If you experience a medical emergency call 911 or have someone drive you to your nearest emergency department.         Parking Information:    Free parking is available for patients and guests. There is  parking at the [address] in front of the hospital. Parking [facility] also available [facility] the [facility] and [facility].             Financial Assistance:    If you have any questions or need assistance, contact your [facility] financial counseling office from 8:30 a.m.-4:30  p.m. Monday through Friday. Closed weekends.    •  [place]: [phone] or, or visit at [address] [facility], near the entrance.        Patient  Payments and Correspondence    Customer service representatives are available to assist you from 8:00 a.m. to 6:00 p.m. Eastern Standard Time by calling [phone] Monday through Friday. You can also contact us through [employer].    [facility]  [id number]  [place] [address] [phone]

## 2022-03-02 NOTE — PROGRESS NOTES
PROBLEM LIST:  Oncology/Hematology History   Endometrial cancer (HCC)   9/30/2011 Imaging    TVUS and CT scan for palpable left pelvic mass upon annual exam and new abdominal symptoms.      10/6/2011 Surgery    ADY/BSO with pelvic lymph node dissection. Stage 1A grade 2 endometrial adenocarcinoma by final pathology     1/26/2022 Imaging    IMPRESSION:     Hypermetabolic soft tissue nodule in the left breast compatible with  biopsy-proven invasive ductal carcinoma. There is diffuse low level FDG  uptake in the region of recent right breast biopsy site. A  hypermetabolic mass in the left lower lobe is suspicious for pulmonary  metastasis. No additional sites of metastases are identified. There is  no evidence of discrete/focal hypermetabolic lesion of the sternum to  correspond with the indeterminant sternal lesion described on breast MRI  exam.     2/14/2022 Biopsy    Final Diagnosis   LEFT LUNG, BIOPSY:  Metastatic adenocarcinoma.  See comment.  GJK   Electronically signed by Wolfgang Hair MD on 2/22/2022 at 1154   Comment    Sections show small fragments of tumor that are morphologically distinct from the patient's known breast cancers.  Immunoprofile suggests mullerian origin.  Clinical correlation required.  This case was shown for intradepartmental consultation and the other pathologist concurs with the findings interpretation.  This case was discussed with Dr. Trevino on 02/21/22.  This case was discussed with Dr. Reed on 02/22/22.        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  Right breast cancer-invasive ductal carcinoma grade 2-ER negative KS negative HER-2 negative    2.  Left breast cancer-invasive ductal carcinoma grade 2-ER positive KS negative HER-2 negative     1/26/2022  Imaging    EXAMINATION: NM PET/CT SKULL BASE TO MID THIGH      FINDINGS:      Today's 3-D images demonstrate focal hypermetabolism in the soft tissues  of the left breast as well as focal hypermetabolism within the left  midlung.     Multiplanar images of the head and neck demonstrate symmetric FDG uptake  within the brain. There is no evidence of hypermetabolic neck mass or  lymph node.     In the upper outer quadrant of the left breast there is redemonstration  of a irregular 2.1 cm soft tissue nodule with FDG hypermetabolism of SUV  max 4.2, compatible with biopsy-proven invasive ductal carcinoma. There  is an ill-defined diffuse region of low-level hypermetabolism in the  right breast with adjacent biopsy clip and single locule of soft tissue  air, compatible with recent right breast biopsy. A discrete  hypermetabolic nodule or mass in the right breast is not confidently  identified. There is no hypermetabolic or enlarged axillary lymph nodes.     Within the chest there is redemonstration of a lobulated soft tissue  mass in the superior aspect of the left lower lobe which appears  hypermetabolic with SUV max 6.5. There is no evidence of hypermetabolic  mediastinal or hilar adenopathy.     Below the diaphragm there is expected physiologic uptake within the   and GI tracts. No evidence of abdominal pelvic malignancy or metastasis.  No hypermetabolic abdominopelvic lymph nodes. Photopenic left renal cyst  is noted.     There is no hypermetabolic pathologic marrow process. Specifically,  there is no evidence of hypermetabolic lesion of the sternum to  correspond with the indeterminant sternal lesion detailed on prior  breast MRI exam. FDG uptake at the left antecubital fossa reflects site  of IV administration.     IMPRESSION:     Hypermetabolic soft tissue nodule in the left breast compatible with  biopsy-proven invasive ductal carcinoma. There is diffuse low level FDG  uptake in the region of recent right breast  biopsy site. A  hypermetabolic mass in the left lower lobe is suspicious for pulmonary  metastasis. No additional sites of metastases are identified. There is  no evidence of discrete/focal hypermetabolic lesion of the sternum to  correspond with the indeterminant sternal lesion described on breast MRI  exam.               REASON FOR VISIT: Management of my malignancies    HISTORY OF PRESENT ILLNESS:   76 y.o.  female presents today for follow-up.  She underwent a biopsy of the lung mass which showed an endometrial cancer.  She is currently scheduled to undergo a laparoscopy tomorrow with Dr. Pedroza.  If that is negative then plan for bilateral lumpectomies to take care of the breast cancer and subsequent CyberKnife to the lesion in her lung.  I will also treat her with chemotherapy after surgery with likely 6 cycles of carboplatin and Taxol.  Due to her age I would likely have to dose reduce her treatment.  I discussed this with her today.    Past medical history, social history and family history was reviewed 03/02/22 and unchanged from prior visit.    Review of Systems:    Review of Systems - Oncology         Medications:        Current Outpatient Medications:   •  carvedilol (COREG) 12.5 MG tablet, Take 12.5 mg by mouth 2 (Two) Times a Day With Meals., Disp: , Rfl:   •  ezetimibe (ZETIA) 10 MG tablet, Take 10 mg by mouth Every Night., Disp: , Rfl:   •  ferrous sulfate 325 (65 FE) MG tablet, Take 325 mg by mouth Daily With Breakfast., Disp: , Rfl:   •  letrozole (Femara) 2.5 MG tablet, Take 1 tablet by mouth Daily for 90 days., Disp: 90 tablet, Rfl: 3  •  lisinopril (PRINIVIL,ZESTRIL) 40 MG tablet, Take 40 mg by mouth Daily., Disp: , Rfl:   •  NIFEdipine XL (PROCARDIA XL) 30 MG 24 hr tablet, Take 30 mg by mouth Daily., Disp: , Rfl:   •  pantoprazole (PROTONIX) 40 MG EC tablet, , Disp: , Rfl:   •  pravastatin (PRAVACHOL) 80 MG tablet, , Disp: , Rfl: 1  •  ursodiol (ACTIGALL) 500 MG tablet, , Disp: , Rfl:       "      ALLERGIES:  No Known Allergies      Physical Exam    VITAL SIGNS:  /80   Pulse 67   Temp 97.1 °F (36.2 °C) (Temporal)   Resp 16   Ht 160 cm (63\")   Wt 72.6 kg (160 lb 1.6 oz)   SpO2 97%   BMI 28.36 kg/m²     ECOG score: 0           Wt Readings from Last 3 Encounters:   03/02/22 72.6 kg (160 lb 1.6 oz)   02/24/22 73.9 kg (162 lb 14.4 oz)   02/17/22 73 kg (161 lb)       Body mass index is 28.36 kg/m². Body surface area is 1.76 meters squared.    Pain Score    03/02/22 0931   PainSc: 0-No pain           Performance Status: 0    General: well appearing, in no acute distress  HEENT: sclera anicteric, neck is supple  Lymphatics: no cervical, supraclavicular, or axillary adenopathy  Cardiovascular: regular rate and rhythm, no murmurs, rubs or gallops  Lungs: clear to auscultation bilaterally  Abdomen: soft, nontender, nondistended.  No palpable organomegaly  Extremities: no lower extremity edema  Skin: no rashes, lesions, bruising, or petechiae  Msk:  Shows no weakness of the large muscle groups  Psych: Mood is stable        RECENT LABS:    Lab Results   Component Value Date    HGB 12.8 02/14/2022    HCT 38.5 02/14/2022    MCV 92.3 02/14/2022     02/14/2022    WBC 7.70 02/14/2022    NEUTROABS 5.38 02/14/2022    LYMPHSABS 1.37 02/14/2022    MONOSABS 0.79 02/14/2022    EOSABS 0.09 02/14/2022    BASOSABS 0.04 02/14/2022       Lab Results   Component Value Date    GLUCOSE 128 (H) 02/14/2022    BUN 17 02/14/2022    CREATININE 0.82 02/14/2022     (L) 02/14/2022    K 4.6 02/14/2022    CL 99 02/14/2022    CO2 23.0 02/14/2022    CALCIUM 9.6 02/14/2022       CT Chest With Contrast Diagnostic    Result Date: 1/10/2022  CT demonstrates no specific osseous correlate to the sternal lesion noted on recent MRI. No corresponding lytic or sclerotic osseous lesion is present. There is no evidence of cortical breakthrough.  An irregular lobulated homogeneous 3.7 x 2.1 cm nodule is noted along the fissure the " "left lung base. Findings remain suspicious for metastatic involvement, however granulomatous process or possibly pulmonary AVM could have similar appearance. PET/CT would be useful to characterize but this finding and further evaluate for possible CT occult sternal osseous metastasis.  Redemonstration of bilateral soft tissue breast masses concerning for malignancy, better characterized on recent MRI.  This report was finalized on 1/10/2022 3:34 PM by Rodríguez Bacon.      XR Chest 1 View    Result Date: 2/14/2022  No pneumothorax.  This report was finalized on 2/14/2022 2:29 PM by Rodríguez Bacon.      XR Chest 1 View    Result Date: 2/14/2022  Left lower lobe pulmonary nodule faintly seen. There is no pneumothorax or other evident immediate complication. Unchanged heart and mediastinal contours.  This report was finalized on 2/14/2022 1:06 PM by Rodríguez Bacon.      CT Needle Biopsy Lung    Result Date: 2/16/2022  Impression:                                                              Successful CT guided core biopsy of a mass in the left lobe of the lung as described above.  Thank you for the opportunity to assist in the care of your patient.  This report was finalized on 2/16/2022 8:39 AM by Geovani Wilkins MD.      Mammo Post Clip Placement Right    Result Date: 1/27/2022  Findings highly suggestive of malignancy at the 11:00 position of the right breast both mammographically and sonographically.   BI-RADS CATEGORY:  5, HIGHLY SUGGESTIVE OF MALIGNANCY   RECOMMENDATION:  Ultrasound-guided biopsy  CAD was utilized.     10G ELEVATION VACUUM-ASSISTED ULTRASOUND GUIDED CORE BIOPSY    History: Findings highly suggestive of malignancy at the right 11-11 30 position  Procedure: Written and verbal consent was obtained for ultrasound guided core biopsy of a 2 to 3 cm centimeter ill-defined spiculated area corresponding to MRI nonmass enhancement at the right 11-11 30 position \" Time out\" was observed to verify the " patient's identity and correct location of the breast abnormality. The presence of the lesion was confirmed ultrasound and a lateral approach was chosen. The breast was prepped and draped in the usual sterile fashion and 10 mL 1% lidocaine with and without epinephrine was utilized for local anesthesia. A small skin incision was made with a scalpel and a 10-gauge needle with an overlying sheath attached to the core biopsy device was introduced into the breast under direct sonographic guidance. The needle was placed within to the mass. A total of 6 core samples were obtained. The specimens were placed in formalin and forwarded to the pathology department. A post biopsy marking clip was placed. Post biopsy mammographic images were obtained. The clip was noted to be in excellent position in the anterior aspect of the 2 adjacent areas of nonmass enhancement seen by MRI  Upon completion of the procedure, compression was applied to the biopsy site until all appreciable bleeding subsided and a sterile dressing was applied. Post biopsy instructions were reviewed with the patient by our clinical breast imaging staff. A written copy of these instructions was also given to the patient. The patient tolerated the procedure well and no immediate complications occurred.   SUMMARY: 10-gauge ultrasound guided and vacuum assisted core biopsy of a 2-3 cm right breast mass located at the 11-11 30 position.  A post biopsy marking clip was placed.  PATHOLOGY: Invasive ductal carcinoma ER negative, [place] negative, HER-2/mary equivocal. Findings are concordant. The patient will be referred to back to [facility]  This report was finalized on 1/27/2022 11:40 AM by Dr. Svetlana Juárez MD.      MRI Breast Bilateral Diagnostic With & Without Contrast    Result Date: 12/30/2021  1. Known biopsy-proven malignancy on the left 2. Findings highly suggestive of malignancy on the right as described 3. Indeterminate sternal lesion measuring almost 2  "cm with questionable expansion/erosion of the anterior sternal cortex. Dedicated bone imaging is recommended.  RECOMMENDATIONS: 1. Diagnostic right mammography and sonography for evaluation for biopsy of the adjacent areas of concern in the right upper outer quadrant. 2. Dedicated bone evaluation of the sternal lesion.  BI-RADS CATEGORY: 5, HIGHLY SUGGESTIVE OF MALIGNANCY  FINAL MRI RESULTS AND RECOMMENDATIONS WILL BE CALLED TO THE PATIENT BY A BREAST CARE NURSE.  This report was finalized on 12/30/2021 11:48 AM by Dr. Svetlana Juárez MD.      NM PET/CT Skull Base to Mid Thigh    Result Date: 1/26/2022   Hypermetabolic soft tissue nodule in the left breast compatible with biopsy-proven invasive ductal carcinoma. There is diffuse low level FDG uptake in the region of recent right breast biopsy site. A hypermetabolic mass in the left lower lobe is suspicious for pulmonary metastasis. No additional sites of metastases are identified. There is no evidence of discrete/focal hypermetabolic lesion of the sternum to correspond with the indeterminant sternal lesion described on breast MRI exam.   This report was finalized on 1/26/2022 3:33 PM by Anil Saha MD.      Mammo Diagnostic Digital Tomosynthesis Right With CAD    Result Date: 1/27/2022  Findings highly suggestive of malignancy at the 11:00 position of the right breast both mammographically and sonographically.   BI-RADS CATEGORY:  5, HIGHLY SUGGESTIVE OF MALIGNANCY   RECOMMENDATION:  Ultrasound-guided biopsy  CAD was utilized.     10G ELEVATION VACUUM-ASSISTED ULTRASOUND GUIDED CORE BIOPSY    History: Findings highly suggestive of malignancy at the right 11-11 30 position  Procedure: Written and verbal consent was obtained for ultrasound guided core biopsy of a 2 to 3 cm centimeter ill-defined spiculated area corresponding to MRI nonmass enhancement at the right 11-11 30 position \" Time out\" was observed to verify the patient's identity and correct location of " the breast abnormality. The presence of the lesion was confirmed ultrasound and a lateral approach was chosen. The breast was prepped and draped in the usual sterile fashion and 10 mL 1% lidocaine with and without epinephrine was utilized for local anesthesia. A small skin incision was made with a scalpel and a 10-gauge needle with an overlying sheath attached to the core biopsy device was introduced into the breast under direct sonographic guidance. The needle was placed within to the mass. A total of 6 core samples were obtained. The specimens were placed in formalin and forwarded to the pathology department. A post biopsy marking clip was placed. Post biopsy mammographic images were obtained. The clip was noted to be in excellent position in the anterior aspect of the 2 adjacent areas of nonmass enhancement seen by MRI  Upon completion of the procedure, compression was applied to the biopsy site until all appreciable bleeding subsided and a sterile dressing was applied. Post biopsy instructions were reviewed with the patient by our clinical breast imaging staff. A written copy of these instructions was also given to the patient. The patient tolerated the procedure well and no immediate complications occurred.   SUMMARY: 10-gauge ultrasound guided and vacuum assisted core biopsy of a 2-3 cm right breast mass located at the 11-11 30 position.  A post biopsy marking clip was placed.  PATHOLOGY: Invasive ductal carcinoma ER negative, [place] negative, HER-2/mary equivocal. Findings are concordant. The patient will be referred to back to [facility]  This report was finalized on 1/27/2022 11:40 AM by Dr. Svetlana Juárez MD.      US Guided Breast Biopsy With & Without Device initial    Result Date: 1/27/2022  Findings highly suggestive of malignancy at the 11:00 position of the right breast both mammographically and sonographically.   BI-RADS CATEGORY:  5, HIGHLY SUGGESTIVE OF MALIGNANCY   RECOMMENDATION:   "Ultrasound-guided biopsy  CAD was utilized.     10G ELEVATION VACUUM-ASSISTED ULTRASOUND GUIDED CORE BIOPSY    History: Findings highly suggestive of malignancy at the right 11-11 30 position  Procedure: Written and verbal consent was obtained for ultrasound guided core biopsy of a 2 to 3 cm centimeter ill-defined spiculated area corresponding to MRI nonmass enhancement at the right 11-11 30 position \" Time out\" was observed to verify the patient's identity and correct location of the breast abnormality. The presence of the lesion was confirmed ultrasound and a lateral approach was chosen. The breast was prepped and draped in the usual sterile fashion and 10 mL 1% lidocaine with and without epinephrine was utilized for local anesthesia. A small skin incision was made with a scalpel and a 10-gauge needle with an overlying sheath attached to the core biopsy device was introduced into the breast under direct sonographic guidance. The needle was placed within to the mass. A total of 6 core samples were obtained. The specimens were placed in formalin and forwarded to the pathology department. A post biopsy marking clip was placed. Post biopsy mammographic images were obtained. The clip was noted to be in excellent position in the anterior aspect of the 2 adjacent areas of nonmass enhancement seen by MRI  Upon completion of the procedure, compression was applied to the biopsy site until all appreciable bleeding subsided and a sterile dressing was applied. Post biopsy instructions were reviewed with the patient by our clinical breast imaging staff. A written copy of these instructions was also given to the patient. The patient tolerated the procedure well and no immediate complications occurred.   SUMMARY: 10-gauge ultrasound guided and vacuum assisted core biopsy of a 2-3 cm right breast mass located at the 11-11 30 position.  A post biopsy marking clip was placed.  PATHOLOGY: Invasive ductal carcinoma ER negative, [place] " negative, HER-2/mary equivocal. Findings are concordant. The patient will be referred to back to [facility]  This report was finalized on 1/27/2022 11:40 AM by Dr. Svetlana Juárez MD.            Assessment/Plan    1.  Metastatic endometrial cancer with a solitary met in the lung.  Plan for CyberKnife to the lung met if her laparoscopy is negative for carcinomatosis.  She is undergoing that procedure tomorrow.  I will likely treat her with carboplatin and Taxol after she completes surgeries for her breasts.    2.  Bilateral breast cancer with a triple negative breast cancer on the right and a ER positive breast cancer on the left.  Plan for bilateral lumpectomies with Dr. Reed after she completes her laparoscopy.          Ruby Trevino MD  [facility]ematology and Oncology         No orders of the defined types were placed in this encounter.      3/2/2022

## 2022-03-03 ENCOUNTER — ANESTHESIA (OUTPATIENT)
Dept: PERIOP | Facility: HOSPITAL | Age: 77
End: 2022-03-03

## 2022-03-03 ENCOUNTER — HOSPITAL ENCOUNTER (OUTPATIENT)
Facility: HOSPITAL | Age: 77
Discharge: HOME | End: 2022-03-03
Attending: OBSTETRICS & GYNECOLOGY | Admitting: OBSTETRICS & GYNECOLOGY

## 2022-03-03 VITALS
DIASTOLIC BLOOD PRESSURE: 115 MMHG | HEART RATE: 90 BPM | RESPIRATION RATE: 16 BRPM | OXYGEN SATURATION: 97 % | SYSTOLIC BLOOD PRESSURE: 174 MMHG | TEMPERATURE: 98 F

## 2022-03-03 PROCEDURE — 25010000002 DEXAMETHASONE PER 1 MG: Performed by: NURSE ANESTHETIST, CERTIFIED REGISTERED

## 2022-03-03 PROCEDURE — 25010000002 PROPOFOL 10 MG/ML EMULSION: Performed by: NURSE ANESTHETIST, CERTIFIED REGISTERED

## 2022-03-03 PROCEDURE — 49320 DIAG LAPARO SEPARATE PROC: CPT | Performed by: OBSTETRICS & GYNECOLOGY

## 2022-03-03 PROCEDURE — 25010000002 ONDANSETRON PER 1 MG: Performed by: NURSE ANESTHETIST, CERTIFIED REGISTERED

## 2022-03-03 PROCEDURE — 25010000002 HEPARIN (PORCINE) PER 1000 UNITS: Performed by: OBSTETRICS & GYNECOLOGY

## 2022-03-03 PROCEDURE — 25010000002 FENTANYL CITRATE (PF) 50 MCG/ML SOLUTION: Performed by: NURSE ANESTHETIST, CERTIFIED REGISTERED

## 2022-03-03 RX ORDER — SODIUM CHLORIDE 9 MG/ML
INJECTION, SOLUTION INTRAVENOUS AS NEEDED
Status: DISCONTINUED | OUTPATIENT
Start: 2022-03-03 | End: 2022-03-03 | Stop reason: HOSPADM

## 2022-03-03 RX ORDER — FENTANYL CITRATE 50 UG/ML
INJECTION, SOLUTION INTRAMUSCULAR; INTRAVENOUS AS NEEDED
Status: DISCONTINUED | OUTPATIENT
Start: 2022-03-03 | End: 2022-03-03 | Stop reason: SURG

## 2022-03-03 RX ORDER — SODIUM CHLORIDE, SODIUM LACTATE, POTASSIUM CHLORIDE, CALCIUM CHLORIDE 600; 310; 30; 20 MG/100ML; MG/100ML; MG/100ML; MG/100ML
9 INJECTION, SOLUTION INTRAVENOUS CONTINUOUS
Status: DISCONTINUED | OUTPATIENT
Start: 2022-03-03 | End: 2022-03-03 | Stop reason: HOSPADM

## 2022-03-03 RX ORDER — HEPARIN SODIUM 5000 [USP'U]/ML
5000 INJECTION, SOLUTION INTRAVENOUS; SUBCUTANEOUS ONCE
Status: DISCONTINUED | OUTPATIENT
Start: 2022-03-03 | End: 2022-03-03 | Stop reason: HOSPADM

## 2022-03-03 RX ORDER — DROPERIDOL 2.5 MG/ML
0.62 INJECTION, SOLUTION INTRAMUSCULAR; INTRAVENOUS ONCE AS NEEDED
Status: DISCONTINUED | OUTPATIENT
Start: 2022-03-03 | End: 2022-03-03 | Stop reason: HOSPADM

## 2022-03-03 RX ORDER — HEPARIN SODIUM 5000 [USP'U]/ML
INJECTION, SOLUTION INTRAVENOUS; SUBCUTANEOUS AS NEEDED
Status: DISCONTINUED | OUTPATIENT
Start: 2022-03-03 | End: 2022-03-03 | Stop reason: HOSPADM

## 2022-03-03 RX ORDER — LIDOCAINE HYDROCHLORIDE 10 MG/ML
0.5 INJECTION, SOLUTION EPIDURAL; INFILTRATION; INTRACAUDAL; PERINEURAL ONCE AS NEEDED
Status: COMPLETED | OUTPATIENT
Start: 2022-03-03 | End: 2022-03-03

## 2022-03-03 RX ORDER — ACETAMINOPHEN 325 MG/1
650 TABLET ORAL ONCE
Status: COMPLETED | OUTPATIENT
Start: 2022-03-03 | End: 2022-03-03

## 2022-03-03 RX ORDER — HYDROMORPHONE HYDROCHLORIDE 1 MG/ML
0.5 INJECTION, SOLUTION INTRAMUSCULAR; INTRAVENOUS; SUBCUTANEOUS
Status: DISCONTINUED | OUTPATIENT
Start: 2022-03-03 | End: 2022-03-03 | Stop reason: HOSPADM

## 2022-03-03 RX ORDER — SODIUM CHLORIDE 0.9 % (FLUSH) 0.9 %
10 SYRINGE (ML) INJECTION AS NEEDED
Status: DISCONTINUED | OUTPATIENT
Start: 2022-03-03 | End: 2022-03-03 | Stop reason: HOSPADM

## 2022-03-03 RX ORDER — BUPIVACAINE HYDROCHLORIDE AND EPINEPHRINE 2.5; 5 MG/ML; UG/ML
INJECTION, SOLUTION EPIDURAL; INFILTRATION; INTRACAUDAL; PERINEURAL AS NEEDED
Status: DISCONTINUED | OUTPATIENT
Start: 2022-03-03 | End: 2022-03-03 | Stop reason: HOSPADM

## 2022-03-03 RX ORDER — MIDAZOLAM HYDROCHLORIDE 1 MG/ML
0.5 INJECTION INTRAMUSCULAR; INTRAVENOUS
Status: DISCONTINUED | OUTPATIENT
Start: 2022-03-03 | End: 2022-03-03 | Stop reason: HOSPADM

## 2022-03-03 RX ORDER — GLYCOPYRROLATE 0.2 MG/ML
INJECTION INTRAMUSCULAR; INTRAVENOUS AS NEEDED
Status: DISCONTINUED | OUTPATIENT
Start: 2022-03-03 | End: 2022-03-03 | Stop reason: SURG

## 2022-03-03 RX ORDER — LABETALOL HYDROCHLORIDE 5 MG/ML
10 INJECTION, SOLUTION INTRAVENOUS
Status: DISCONTINUED | OUTPATIENT
Start: 2022-03-03 | End: 2022-03-03 | Stop reason: HOSPADM

## 2022-03-03 RX ORDER — SODIUM CHLORIDE 0.9 % (FLUSH) 0.9 %
3-10 SYRINGE (ML) INJECTION AS NEEDED
Status: DISCONTINUED | OUTPATIENT
Start: 2022-03-03 | End: 2022-03-03 | Stop reason: HOSPADM

## 2022-03-03 RX ORDER — NALOXONE HCL 0.4 MG/ML
0.4 VIAL (ML) INJECTION AS NEEDED
Status: DISCONTINUED | OUTPATIENT
Start: 2022-03-03 | End: 2022-03-03 | Stop reason: HOSPADM

## 2022-03-03 RX ORDER — ROCURONIUM BROMIDE 10 MG/ML
INJECTION, SOLUTION INTRAVENOUS AS NEEDED
Status: DISCONTINUED | OUTPATIENT
Start: 2022-03-03 | End: 2022-03-03 | Stop reason: SURG

## 2022-03-03 RX ORDER — IPRATROPIUM BROMIDE AND ALBUTEROL SULFATE 2.5; .5 MG/3ML; MG/3ML
3 SOLUTION RESPIRATORY (INHALATION) ONCE AS NEEDED
Status: DISCONTINUED | OUTPATIENT
Start: 2022-03-03 | End: 2022-03-03 | Stop reason: HOSPADM

## 2022-03-03 RX ORDER — IBUPROFEN 600 MG/1
600 TABLET ORAL EVERY 6 HOURS PRN
Qty: 15 TABLET | Refills: 0 | Status: SHIPPED | OUTPATIENT
Start: 2022-03-03

## 2022-03-03 RX ORDER — LIDOCAINE HYDROCHLORIDE 10 MG/ML
INJECTION, SOLUTION EPIDURAL; INFILTRATION; INTRACAUDAL; PERINEURAL AS NEEDED
Status: DISCONTINUED | OUTPATIENT
Start: 2022-03-03 | End: 2022-03-03 | Stop reason: SURG

## 2022-03-03 RX ORDER — ONDANSETRON 2 MG/ML
4 INJECTION INTRAMUSCULAR; INTRAVENOUS ONCE AS NEEDED
Status: DISCONTINUED | OUTPATIENT
Start: 2022-03-03 | End: 2022-03-03 | Stop reason: HOSPADM

## 2022-03-03 RX ORDER — PROMETHAZINE HYDROCHLORIDE 25 MG/1
25 SUPPOSITORY RECTAL ONCE AS NEEDED
Status: DISCONTINUED | OUTPATIENT
Start: 2022-03-03 | End: 2022-03-03 | Stop reason: HOSPADM

## 2022-03-03 RX ORDER — DROPERIDOL 2.5 MG/ML
0.62 INJECTION, SOLUTION INTRAMUSCULAR; INTRAVENOUS AS NEEDED
Status: DISCONTINUED | OUTPATIENT
Start: 2022-03-03 | End: 2022-03-03 | Stop reason: HOSPADM

## 2022-03-03 RX ORDER — CELECOXIB 200 MG/1
200 CAPSULE ORAL ONCE
Status: COMPLETED | OUTPATIENT
Start: 2022-03-03 | End: 2022-03-03

## 2022-03-03 RX ORDER — SODIUM CHLORIDE 0.9 % (FLUSH) 0.9 %
3 SYRINGE (ML) INJECTION EVERY 12 HOURS SCHEDULED
Status: DISCONTINUED | OUTPATIENT
Start: 2022-03-03 | End: 2022-03-03 | Stop reason: HOSPADM

## 2022-03-03 RX ORDER — SENNOSIDES 8.6 MG
650 CAPSULE ORAL EVERY 8 HOURS PRN
Qty: 40 TABLET | Refills: 0 | Status: SHIPPED | OUTPATIENT
Start: 2022-03-03

## 2022-03-03 RX ORDER — MAGNESIUM HYDROXIDE 1200 MG/15ML
LIQUID ORAL AS NEEDED
Status: DISCONTINUED | OUTPATIENT
Start: 2022-03-03 | End: 2022-03-03 | Stop reason: HOSPADM

## 2022-03-03 RX ORDER — EPHEDRINE SULFATE 50 MG/ML
INJECTION, SOLUTION INTRAVENOUS AS NEEDED
Status: DISCONTINUED | OUTPATIENT
Start: 2022-03-03 | End: 2022-03-03 | Stop reason: SURG

## 2022-03-03 RX ORDER — PROMETHAZINE HYDROCHLORIDE 25 MG/1
25 TABLET ORAL ONCE AS NEEDED
Status: DISCONTINUED | OUTPATIENT
Start: 2022-03-03 | End: 2022-03-03 | Stop reason: HOSPADM

## 2022-03-03 RX ORDER — HYDROCODONE BITARTRATE AND ACETAMINOPHEN 5; 325 MG/1; MG/1
1 TABLET ORAL ONCE AS NEEDED
Status: DISCONTINUED | OUTPATIENT
Start: 2022-03-03 | End: 2022-03-03 | Stop reason: HOSPADM

## 2022-03-03 RX ORDER — SODIUM CHLORIDE 0.9 % (FLUSH) 0.9 %
10 SYRINGE (ML) INJECTION EVERY 12 HOURS SCHEDULED
Status: DISCONTINUED | OUTPATIENT
Start: 2022-03-03 | End: 2022-03-03 | Stop reason: HOSPADM

## 2022-03-03 RX ORDER — FAMOTIDINE 20 MG/1
20 TABLET, FILM COATED ORAL ONCE
Status: COMPLETED | OUTPATIENT
Start: 2022-03-03 | End: 2022-03-03

## 2022-03-03 RX ORDER — DEXAMETHASONE SODIUM PHOSPHATE 4 MG/ML
INJECTION, SOLUTION INTRA-ARTICULAR; INTRALESIONAL; INTRAMUSCULAR; INTRAVENOUS; SOFT TISSUE AS NEEDED
Status: DISCONTINUED | OUTPATIENT
Start: 2022-03-03 | End: 2022-03-03 | Stop reason: SURG

## 2022-03-03 RX ORDER — ONDANSETRON 2 MG/ML
INJECTION INTRAMUSCULAR; INTRAVENOUS AS NEEDED
Status: DISCONTINUED | OUTPATIENT
Start: 2022-03-03 | End: 2022-03-03 | Stop reason: SURG

## 2022-03-03 RX ORDER — PROPOFOL 10 MG/ML
VIAL (ML) INTRAVENOUS AS NEEDED
Status: DISCONTINUED | OUTPATIENT
Start: 2022-03-03 | End: 2022-03-03 | Stop reason: SURG

## 2022-03-03 RX ORDER — LABETALOL HYDROCHLORIDE 5 MG/ML
INJECTION, SOLUTION INTRAVENOUS
Status: COMPLETED
Start: 2022-03-03 | End: 2022-03-03

## 2022-03-03 RX ORDER — FENTANYL CITRATE 50 UG/ML
50 INJECTION, SOLUTION INTRAMUSCULAR; INTRAVENOUS
Status: DISCONTINUED | OUTPATIENT
Start: 2022-03-03 | End: 2022-03-03 | Stop reason: HOSPADM

## 2022-03-03 RX ADMIN — LIDOCAINE HYDROCHLORIDE 50 MG: 10 INJECTION, SOLUTION EPIDURAL; INFILTRATION; INTRACAUDAL; PERINEURAL at 09:57

## 2022-03-03 RX ADMIN — DEXAMETHASONE SODIUM PHOSPHATE 8 MG: 4 INJECTION, SOLUTION INTRA-ARTICULAR; INTRALESIONAL; INTRAMUSCULAR; INTRAVENOUS; SOFT TISSUE at 10:05

## 2022-03-03 RX ADMIN — EPHEDRINE SULFATE 10 MG: 50 INJECTION INTRAVENOUS at 10:06

## 2022-03-03 RX ADMIN — CELECOXIB 200 MG: 200 CAPSULE ORAL at 08:07

## 2022-03-03 RX ADMIN — LABETALOL HYDROCHLORIDE 10 MG: 5 INJECTION, SOLUTION INTRAVENOUS at 11:00

## 2022-03-03 RX ADMIN — FENTANYL CITRATE 50 MCG: 50 INJECTION, SOLUTION INTRAMUSCULAR; INTRAVENOUS at 09:57

## 2022-03-03 RX ADMIN — SUGAMMADEX 200 MG: 100 INJECTION, SOLUTION INTRAVENOUS at 10:37

## 2022-03-03 RX ADMIN — ONDANSETRON 4 MG: 2 INJECTION INTRAMUSCULAR; INTRAVENOUS at 10:36

## 2022-03-03 RX ADMIN — ROCURONIUM BROMIDE 50 MG: 10 INJECTION, SOLUTION INTRAVENOUS at 09:57

## 2022-03-03 RX ADMIN — PROPOFOL 150 MG: 10 INJECTION, EMULSION INTRAVENOUS at 09:57

## 2022-03-03 RX ADMIN — LABETALOL 20 MG/4 ML (5 MG/ML) INTRAVENOUS SYRINGE 10 MG: at 11:00

## 2022-03-03 RX ADMIN — EPHEDRINE SULFATE 5 MG: 50 INJECTION INTRAVENOUS at 10:08

## 2022-03-03 RX ADMIN — SODIUM CHLORIDE, POTASSIUM CHLORIDE, SODIUM LACTATE AND CALCIUM CHLORIDE 9 ML/HR: 600; 310; 30; 20 INJECTION, SOLUTION INTRAVENOUS at 07:55

## 2022-03-03 RX ADMIN — GLYCOPYRROLATE 0.2 MG: 0.2 INJECTION INTRAMUSCULAR; INTRAVENOUS at 10:09

## 2022-03-03 RX ADMIN — FAMOTIDINE 20 MG: 20 TABLET, FILM COATED ORAL at 08:07

## 2022-03-03 RX ADMIN — FENTANYL CITRATE 50 MCG: 50 INJECTION, SOLUTION INTRAMUSCULAR; INTRAVENOUS at 10:22

## 2022-03-03 RX ADMIN — GLYCOPYRROLATE 0.2 MG: 0.2 INJECTION INTRAMUSCULAR; INTRAVENOUS at 10:07

## 2022-03-03 RX ADMIN — ACETAMINOPHEN 650 MG: 325 TABLET ORAL at 08:07

## 2022-03-03 RX ADMIN — LIDOCAINE HYDROCHLORIDE 0.2 ML: 10 INJECTION, SOLUTION EPIDURAL; INFILTRATION; INTRACAUDAL; PERINEURAL at 07:55

## 2022-03-03 NOTE — ANESTHESIA PREPROCEDURE EVALUATION
Anesthesia Evaluation     Patient summary reviewed and Nursing notes reviewed   no history of anesthetic complications:  NPO Solid Status: > 8 hours  NPO Liquid Status: > 8 hours           Airway   Mallampati: II  TM distance: >3 FB  No difficulty expected  Dental    (+) partials    Pulmonary - normal exam     ROS comment: Left lung mass  Cardiovascular - normal exam    ECG reviewed    (+) hypertension, CAD, cardiac stents more than 12 months ago   (-) dysrhythmias, angina      Neuro/Psych- negative ROS  GI/Hepatic/Renal/Endo    (+)   renal disease,   (-) GERD, liver disease, diabetes, no thyroid disorder    Musculoskeletal     Abdominal    Substance History      OB/GYN          Other      history of cancer (breast/endometrial/ovarian)                  Anesthesia Plan    ASA 3     general     intravenous induction     Anesthetic plan, all risks, benefits, and alternatives have been provided, discussed and informed consent has been obtained with: patient.    Plan discussed with CRNA.        CODE STATUS:

## 2022-03-03 NOTE — OP NOTE
Subjective     Date of Service:  03/03/22  Time of Service:  10:45 EST    Surgical Staff: Surgeon(s) and Role:     * Elizabeth Pedroza MD - Primary   Additional Staff: MD Ada  resident   Pre-operative diagnosis(es): Pre-Op Diagnosis Codes:     * Endometrial cancer (HCC) [C54.1]     * Malignant neoplasm of upper-outer quadrant of both breasts in female, estrogen receptor positive (HCC) [C50.411, Z17.0, C50.412]     Post-operative diagnosis(es): Post-Op Diagnosis Codes:     * Endometrial cancer (HCC) [C54.1]     * Malignant neoplasm of upper-outer quadrant of both breasts in female, estrogen receptor positive (HCC) [C50.411, Z17.0, C50.412]   Procedure(s): Procedure(s):  DIAGNOSTIC LAPAROSCOPY     Antibiotics: No antibiotics ordered on call to OR     Anesthesia: Type: General  ASA:  III     Objective      Operative findings:  At the time of laparoscopy, no cancer or carcinomatosis was identified.  There was diverticular disease noted, particularly in the pelvis.   Specimens removed: None   Fluid Intake and Output: I/O this shift:  In: -   Out: 255 [Urine:250; Blood:5]   Blood products used: No   Drains: Urethral Catheter Silicone 16 Fr. (Active)      Implant Information: Nothing was implanted during the procedure   Complications:  No immediate   Intraoperative consult(s):    Condition: stable   Disposition: to PACU and then discharge home       Indications:  Patient's pleasant 76-year-old woman who was diagnosed with breast cancer and has biopsy-proven lung recurrence of endometrial cancer.  There is concern of occult intra-abdominal disease and therefore plan was for diagnostic laparoscopy was made.  Risks and benefits were discussed.  Consent was signed and on chart.    Procedure: After obtaining informed consent, patient was taken to the operating room and underwent general tracheal anesthesia after patient site verification.  Feet were placed in Chuck stirrups and arms were tucked to side.  Patient was  in the supine lithotomy position through the procedure.  Abdomen, perineum, and vagina were prepped and draped in usual sterile fashion.  Dick catheter was anchored.  Prior to each incision, core percent Marcaine with epinephrine was used for local anesthesia.  Varies needle was inserted in the supraumbilical position without difficulty and abdomen was insufflated to pressure 15 mmHg with CO2 gas.  Incision was extended and 5 mm trocar was inserted utilizing Optiview technique.  The above findings were noted.  Bilateral 5 mm trochars were placed under direct visualization.  Careful inspection of the pelvis and upper abdomen was performed.  No carcinomatosis or tumor implants were visualized.  Diverticular disease was noted.  CO2 gas was allowed to escape from the abdominal cavity and trochars were removed under direct visualization.  3-0 Monocryl was used to close the skin in a subcuticular stitch and glue was placed at the skin.  Dick catheter was removed.  All counts were correct.  There are no immediate complications.  Patient was taken in stable condition to the recovery room.    Elizabeth Pedroza MD  03/03/22  10:45 EST

## 2022-03-03 NOTE — INTERVAL H&P NOTE
[facility] Pre-op    Full history and physical note from office is attached.    +cardiac clearance    VS: /99  HR 68  RR 16  T 97.8  Sat 98%RA    Immunizations:  Influenza:  2021  Pneumococcal:  UTD  Tetanus:  UTD  Covid x3: 2021    LAB Results:  Lab Results   Component Value Date    WBC 7.70 02/14/2022    HGB 12.8 02/14/2022    HCT 38.5 02/14/2022    MCV 92.3 02/14/2022     02/14/2022    NEUTROABS 5.38 02/14/2022    GLUCOSE 128 (H) 02/14/2022    BUN 17 02/14/2022    CREATININE 0.82 02/14/2022    EGFRIFNONA 68 02/14/2022     (L) 02/14/2022    K 4.6 02/14/2022    CL 99 02/14/2022    CO2 23.0 02/14/2022    CALCIUM 9.6 02/14/2022    INR 0.94 02/14/2022       Cancer Staging (if applicable)  Cancer Patient: __ yes __no __unknown__N/A; If yes, clinical stage T:__ N:__M:__, stage group or __N/A      Impression: Endometrial cancer; Malignant neoplasm of upper-outer quadrant of both breasts in female, estrogen receptor positive; mass of left lung      Plan: DIAGNOSTIC LAPAROSCOPY, WITH POSSIBLE BIOPSIES      ANTONIO Pereyra   3/3/2022   07:31 EST     I saw and evaluated the patient. I agree with the findings and the plan of care as documented in the note.    Elizabeth Pedroza MD  03/03/22  09:19 EST

## 2022-03-03 NOTE — ANESTHESIA POSTPROCEDURE EVALUATION
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, estrogen receptor positive (HCC) [C50.411, Z17.0, C50.412])    Surgeons: Elizabeth Pedroza MD Provider: Cindy Colin MD    Anesthesia Type: general ASA Status: 3          Anesthesia Type: general    Vitals  Vitals Value Taken Time   /114 03/03/22 1056   Temp 98.2 °F (36.8 °C) 03/03/22 1056   Pulse 90 03/03/22 1056   Resp 16 03/03/22 1056   SpO2 98 % 03/03/22 1056           Post Anesthesia Care and Evaluation    Patient location during evaluation: PACU  Patient participation: complete - patient participated  Level of consciousness: awake and alert  Pain management: adequate  Airway patency: patent  Anesthetic complications: No anesthetic complications  PONV Status: none  Cardiovascular status: hemodynamically stable and acceptable  Respiratory status: nonlabored ventilation, acceptable and nasal cannula  Hydration status: acceptable

## 2022-03-03 NOTE — ANESTHESIA PROCEDURE NOTES
Airway  Urgency: elective    Date/Time: 3/3/2022 9:58 AM  Airway not difficult    General Information and Staff    Patient location during procedure: OR  CRNA: Barbi Wu CRNA    Indications and Patient Condition  Indications for airway management: airway protection    Preoxygenated: yes  MILS not maintained throughout  Mask difficulty assessment: 1 - vent by mask    Final Airway Details  Final airway type: endotracheal airway      Successful airway: ETT  Cuffed: yes   Successful intubation technique: direct laryngoscopy  Endotracheal tube insertion site: oral  Blade: Mireya  Blade size: 3  ETT size (mm): 7.0  Cormack-Lehane Classification: grade I - full view of glottis  Placement verified by: chest auscultation and capnometry   Measured from: lips  ETT/EBT  to lips (cm): 20  Number of attempts at approach: 1  Assessment: lips, teeth, and gum same as pre-op and atraumatic intubation    Additional Comments  Negative epigastric sounds, Breath sound equal bilaterally with symmetric chest rise and fall

## 2022-03-08 ENCOUNTER — TRANSCRIBE ORDERS (OUTPATIENT)
Dept: MAMMOGRAPHY | Facility: HOSPITAL | Age: 77
End: 2022-03-08

## 2022-03-08 DIAGNOSIS — C50.412 MALIGNANT NEOPLASM OF UPPER-OUTER QUADRANT OF LEFT FEMALE BREAST, UNSPECIFIED ESTROGEN RECEPTOR STATUS: ICD-10-CM

## 2022-03-08 DIAGNOSIS — C50.411 MALIGNANT NEOPLASM OF UPPER-OUTER QUADRANT OF RIGHT FEMALE BREAST, UNSPECIFIED ESTROGEN RECEPTOR STATUS: Primary | ICD-10-CM

## 2022-03-15 ENCOUNTER — PRE-ADMISSION TESTING (OUTPATIENT)
Dept: PREADMISSION TESTING | Facility: HOSPITAL | Age: 77
End: 2022-03-15

## 2022-03-15 LAB
ALBUMIN SERPL-MCNC: 4.2 G/DL (ref 3.5–5.2)
ALBUMIN/GLOB SERPL: 1.4 G/DL
ALP SERPL-CCNC: 63 U/L (ref 39–117)
ALT SERPL W P-5'-P-CCNC: 15 U/L (ref 1–33)
ANION GAP SERPL CALCULATED.3IONS-SCNC: 9 MMOL/L (ref 5–15)
AST SERPL-CCNC: 15 U/L (ref 1–32)
BILIRUB SERPL-MCNC: 0.6 MG/DL (ref 0–1.2)
BUN SERPL-MCNC: 18 MG/DL (ref 8–23)
BUN/CREAT SERPL: 21.2 (ref 7–25)
CALCIUM SPEC-SCNC: 9.4 MG/DL (ref 8.6–10.5)
CHLORIDE SERPL-SCNC: 98 MMOL/L (ref 98–107)
CO2 SERPL-SCNC: 26 MMOL/L (ref 22–29)
CREAT SERPL-MCNC: 0.85 MG/DL (ref 0.57–1)
DEPRECATED RDW RBC AUTO: 40.1 FL (ref 37–54)
EGFRCR SERPLBLD CKD-EPI 2021: 71.5 ML/MIN/1.73
ERYTHROCYTE [DISTWIDTH] IN BLOOD BY AUTOMATED COUNT: 12.3 % (ref 12.3–15.4)
GLOBULIN UR ELPH-MCNC: 3 GM/DL
GLUCOSE SERPL-MCNC: 103 MG/DL (ref 65–99)
HCT VFR BLD AUTO: 36.6 % (ref 34–46.6)
HGB BLD-MCNC: 12.6 G/DL (ref 12–15.9)
MCH RBC QN AUTO: 30.5 PG (ref 26.6–33)
MCHC RBC AUTO-ENTMCNC: 34.4 G/DL (ref 31.5–35.7)
MCV RBC AUTO: 88.6 FL (ref 79–97)
PLATELET # BLD AUTO: 146 10*3/MM3 (ref 140–450)
PMV BLD AUTO: 8.6 FL (ref 6–12)
POTASSIUM SERPL-SCNC: 4.6 MMOL/L (ref 3.5–5.2)
PROT SERPL-MCNC: 7.2 G/DL (ref 6–8.5)
RBC # BLD AUTO: 4.13 10*6/MM3 (ref 3.77–5.28)
SARS-COV-2 RNA PNL SPEC NAA+PROBE: NOT DETECTED
SODIUM SERPL-SCNC: 133 MMOL/L (ref 136–145)
WBC NRBC COR # BLD: 9.05 10*3/MM3 (ref 3.4–10.8)

## 2022-03-15 PROCEDURE — 80053 COMPREHEN METABOLIC PANEL: CPT

## 2022-03-15 PROCEDURE — U0004 COV-19 TEST NON-CDC HGH THRU: HCPCS

## 2022-03-15 PROCEDURE — 85027 COMPLETE CBC AUTOMATED: CPT

## 2022-03-15 PROCEDURE — C9803 HOPD COVID-19 SPEC COLLECT: HCPCS

## 2022-03-15 PROCEDURE — 36415 COLL VENOUS BLD VENIPUNCTURE: CPT

## 2022-03-15 NOTE — PAT
Per Anesthesia Request, patient instructed not to take their ACE/ARB medications on the AM of surgery.    An arrival time for procedure WAS given during PAT visit per Alisa at Dr. Reed's office. If patient had any questions or concerns about their arrival time, they were instructed to contact their surgeon/physician.     Cardiac clearance letter and EKG obtained from [facility] from 2/28/22, requiring multiple calls to cardiology before finally receiving all documentation requested.

## 2022-03-16 ENCOUNTER — TRANSCRIBE ORDERS (OUTPATIENT)
Dept: ADMINISTRATIVE | Facility: HOSPITAL | Age: 77
End: 2022-03-16

## 2022-03-16 ENCOUNTER — HOSPITAL ENCOUNTER (OUTPATIENT)
Dept: GENERAL RADIOLOGY | Facility: HOSPITAL | Age: 77
Discharge: HOME | End: 2022-03-16

## 2022-03-16 ENCOUNTER — HOSPITAL ENCOUNTER (OUTPATIENT)
Dept: MAMMOGRAPHY | Facility: HOSPITAL | Age: 77
Discharge: HOME | End: 2022-03-16

## 2022-03-16 ENCOUNTER — LAB REQUISITION (OUTPATIENT)
Dept: LAB | Facility: HOSPITAL | Age: 77
End: 2022-03-16

## 2022-03-16 DIAGNOSIS — C50.919 BREAST CANCER, FEMALE: ICD-10-CM

## 2022-03-16 DIAGNOSIS — C50.411 MALIGNANT NEOPLASM OF UPPER-OUTER QUADRANT OF RIGHT FEMALE BREAST, UNSPECIFIED ESTROGEN RECEPTOR STATUS: ICD-10-CM

## 2022-03-16 DIAGNOSIS — C50.411 MALIGNANT NEOPLASM OF UPPER-OUTER QUADRANT OF RIGHT FEMALE BREAST: ICD-10-CM

## 2022-03-16 DIAGNOSIS — C50.411 MALIGNANT NEOPLASM OF UPPER-OUTER QUADRANT OF RIGHT FEMALE BREAST, UNSPECIFIED ESTROGEN RECEPTOR STATUS: Primary | ICD-10-CM

## 2022-03-16 DIAGNOSIS — C50.412 MALIGNANT NEOPLASM OF UPPER-OUTER QUADRANT OF LEFT FEMALE BREAST, UNSPECIFIED ESTROGEN RECEPTOR STATUS: ICD-10-CM

## 2022-03-16 PROCEDURE — 71045 X-RAY EXAM CHEST 1 VIEW: CPT

## 2022-03-16 PROCEDURE — 76098 X-RAY EXAM SURGICAL SPECIMEN: CPT | Performed by: RADIOLOGY

## 2022-03-16 PROCEDURE — 76098 X-RAY EXAM SURGICAL SPECIMEN: CPT

## 2022-03-16 PROCEDURE — 77001 FLUOROGUIDE FOR VEIN DEVICE: CPT

## 2022-03-16 PROCEDURE — 19282 PERQ DEVICE BREAST EA IMAG: CPT | Performed by: RADIOLOGY

## 2022-03-16 PROCEDURE — 19281 PERQ DEVICE BREAST 1ST IMAG: CPT | Performed by: RADIOLOGY

## 2022-03-16 PROCEDURE — C1819 TISSUE LOCALIZATION-EXCISION: HCPCS

## 2022-03-16 PROCEDURE — 88307 TISSUE EXAM BY PATHOLOGIST: CPT | Performed by: SURGERY

## 2022-03-16 RX ORDER — LIDOCAINE HYDROCHLORIDE 10 MG/ML
5 INJECTION, SOLUTION INFILTRATION; PERINEURAL ONCE
Status: SHIPPED | OUTPATIENT
Start: 2022-03-16

## 2022-03-17 ENCOUNTER — TELEPHONE (OUTPATIENT)
Dept: GYNECOLOGIC ONCOLOGY | Facility: CLINIC | Age: 77
End: 2022-03-17

## 2022-03-17 NOTE — TELEPHONE ENCOUNTER
Informed pt that Dr Pedroza is in a meeting that morning. Patient asked to be moved to later to make it to another appt that same day.

## 2022-03-17 NOTE — TELEPHONE ENCOUNTER
Caller: MIRYAM    Relationship to patient: SELF    Best call back number: [phone]    Patient is needing:TO SEE IF SHE CAN GET 3- POST OP APPT SCHEDULED EARLIER IN THE DAY AROUND 11:30 AM OR 12 PM.

## 2022-03-18 LAB
CYTO UR: NORMAL
LAB AP CASE REPORT: NORMAL
LAB AP CLINICAL INFORMATION: NORMAL
PATH REPORT.FINAL DX SPEC: NORMAL
PATH REPORT.GROSS SPEC: NORMAL

## 2022-03-21 NOTE — PROGRESS NOTES
"Kenisha Jama  [id number]  1945      Reason for Visit: Postoperative evaluation, treatment planning     History of Present Illness:  Patient is a very pleasant 76 y.o. woman who presents for a post operative evaluation status post diagnostic laparoscopy performed on 3/3/22 for biopsy proven lung recurrence of endometrial adenocarcinoma. No significant carcinomatosis was noted intra-abdominally.     Surgery and hospital course were uncomplicated.  Today, patient notes normal bowel and bladder function.  Her pain is well controlled. She has questions about resuming normal activities.     Past Medical History, Past Surgical History, Social History, Family History have been reviewed and are without significant changes except as mentioned.    Review of Systems   All other systems were reviewed and are negative except as mentioned above.    Medications:  The current medication list was reviewed in the EMR    ALLERGIES:  No Known Allergies        BP (!) 191/84   Pulse 66   Temp 97.3 °F (36.3 °C) (Temporal)   Resp 15   Ht 160 cm (62.99\")   Wt 71.7 kg (158 lb)   SpO2 96%   BMI 28.00 kg/m²   ECOG score: 0            Physical Exam  Constitutional:  Patient is a pleasant woman in no acute distress.  Gastrointestinal: Abdomen is soft and appropriately tender.  There is no mass palpated.  There is no rebound or guarding. Incision(s) is clean, dry and intact.  Gynecologic: deferred     PATHOLOGY:  Final Diagnosis   1.  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  See template #1    2.  LEFT BREAST, NEEDLE LOCALIZED LUMPECTOMY:                 Invasive poorly " differentiated ductal carcinoma (Isidro score 8/9)                 Gross tumor size 28 mm                Background high-grade ductal carcinoma in situ with involvement of papillomatous lesion                 Invasive ductal carcinoma less than 1 mm from superior margin and medial margin                 Invasive ductal carcinoma 2 mm from inferior margin                 Ductal carcinoma in situ extends to posterior margin                 Biopsy cavity identified.  See template #2    3.  LEFT BREAST, EXTENDED MEDIAL, SUPERIOR, AND DEEP MARGIN:                 Focal residual carcinoma measuring 3 mm near anterior aspect                New anterior margin width is 9 mm         ASSESSMENT/PLAN:  Kenisha Jama returns for a post-operative evaluation today. Underwent diagnostic laparoscopy after biopsy proven lung metastasis was noted and findings on PET/CT were concerning for abdominal metastasis.  Patient informed that no cancer or carcinomatosis was visualized in the abdomen during surgery.   Lung mass is likely an isolated metastasis and will plan for treatment with CyberKnife.  In regards to her breast cancer, has since underwent bilateral lumpectomies with pathology showing invasive ductal carcinoma bilaterally. Treatment per heme-onc Dr. Trevino.  Overall, the patient is very pleased with her care.  I recommended continuation of post operative precautions as discussed.     She is to follow-up as needed     Chad Peralta M.D  Obstetrics & Gynecology, PGY3    Patient was seen and examined with Dr. Peralta,  resident, who performed portions of the examination and documentation for this patient's care under my direct supervision.  I agree with the above documentation and plan.    Elizabeth Pedroza MD  03/23/22  20:59 EDT

## 2022-03-23 ENCOUNTER — HOSPITAL ENCOUNTER (OUTPATIENT)
Dept: RADIATION ONCOLOGY | Facility: HOSPITAL | Age: 77
Setting detail: RADIATION/ONCOLOGY SERIES
Discharge: HOME | End: 2022-03-23

## 2022-03-23 ENCOUNTER — TRANSCRIBE ORDERS (OUTPATIENT)
Dept: ADMINISTRATIVE | Facility: HOSPITAL | Age: 77
End: 2022-03-23

## 2022-03-23 ENCOUNTER — OFFICE VISIT (OUTPATIENT)
Dept: RADIATION ONCOLOGY | Facility: HOSPITAL | Age: 77
End: 2022-03-23

## 2022-03-23 ENCOUNTER — OFFICE VISIT (OUTPATIENT)
Dept: GYNECOLOGIC ONCOLOGY | Facility: CLINIC | Age: 77
End: 2022-03-23

## 2022-03-23 VITALS
WEIGHT: 158.6 LBS | HEIGHT: 63 IN | DIASTOLIC BLOOD PRESSURE: 84 MMHG | TEMPERATURE: 97.3 F | OXYGEN SATURATION: 96 % | BODY MASS INDEX: 28.1 KG/M2 | HEART RATE: 66 BPM | SYSTOLIC BLOOD PRESSURE: 191 MMHG | RESPIRATION RATE: 18 BRPM

## 2022-03-23 VITALS
RESPIRATION RATE: 15 BRPM | DIASTOLIC BLOOD PRESSURE: 84 MMHG | OXYGEN SATURATION: 96 % | HEART RATE: 66 BPM | TEMPERATURE: 97.3 F | HEIGHT: 63 IN | WEIGHT: 158 LBS | SYSTOLIC BLOOD PRESSURE: 191 MMHG | BODY MASS INDEX: 28 KG/M2

## 2022-03-23 DIAGNOSIS — Z98.890 POST-OPERATIVE STATE: Primary | ICD-10-CM

## 2022-03-23 DIAGNOSIS — C78.02 MALIGNANT NEOPLASM METASTATIC TO LEFT LUNG: Primary | ICD-10-CM

## 2022-03-23 DIAGNOSIS — Z11.59 SPECIAL SCREENING EXAMINATION FOR VIRAL DISEASE: Primary | ICD-10-CM

## 2022-03-23 PROCEDURE — 99024 POSTOP FOLLOW-UP VISIT: CPT | Performed by: OBSTETRICS & GYNECOLOGY

## 2022-03-23 NOTE — PROGRESS NOTES
CONSULTATION NOTE      :                                                          1945  DATE OF CONSULTATION:                       3/23/2022   REQUESTING PHYSICIAN:                   Saumya Reed MD  REASON FOR CONSULTATION:           Secondary malignant neoplasm of lung from endometrial cancer         BRIEF HISTORY:  Ms. Jama is a very pleasant 76 y.o. 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  There was concern of occult intra-abdominal disease and she recently underwent diagnostic laparoscopy.  Per Dr. Pedroza's operative note there was no carcinomatosis of tumor or implants visualized.  CT of the chest on 2022 revealed an irregular lobulated homogenous 3.7 x 2.1 cm nodule located along the fissure at the left lung base.  Biopsy of the mass was positive for metastatic adenocarcinoma that was not consistent with her bilateral breast cancers.  I have been asked to see her regarding CyberKnife SBRT to the lung mass.  She is seeing Dr. Saumya Reed regarding bilateral breast cancer.  She has a dry cough and denies hemoptysis pain.  She is a retired .  She and her  like to travel.  They have been to every state except [place], [place], [place] and one [facility] state.  This is especially impressive since she does not fly.        Allergy: No Known Allergies    Social History:   Social History     Socioeconomic History   • Marital status:    Tobacco Use   • Smoking status: Never Smoker   • Smokeless tobacco: Never Used   Substance and Sexual Activity   • Alcohol use: Yes     Comment: Rare   • Drug use: No   • Sexual activity: Yes     Partners: Male     Comment:         Past Medical History:   Past Medical History:   Diagnosis Date   • Anemia    • Cancer (HCC)     endometrial   • Cancer of breast (HCC)     bilaterally   • Coronary artery disease     1 cardiac stent   • Dyslipidemia    • GERD (gastroesophageal reflux  "disease)    • Heart disease    • Hemorrhoids    • Hypertension    • Metastatic cancer (HCC)    • YEHUDA on CPAP    • Seasonal allergies    • Uterine cancer (HCC)        Family History: family history includes Colon cancer in her father; Diabetes in her mother; Heart disease in her father and mother; Skin cancer in her father.     Surgical History:   Past Surgical History:   Procedure Laterality Date   • BREAST BIOPSY Left 11/2021    o/s u/s   • BREAST CYST EXCISION      x3. Benign   • BREAST LUMPECTOMY  03/16/2022   • COLONOSCOPY     • CORONARY ANGIOPLASTY WITH STENT PLACEMENT     • DIAGNOSTIC LAPAROSCOPY N/A 03/03/2022    Procedure: DIAGNOSTIC LAPAROSCOPY;  Surgeon: Elizabeth Pedroza MD;  Location: [facility];  Service: Gynecology Oncology;  Laterality: N/A;   • ENDOSCOPY     • HYSTERECTOMY          Review of Systems:   Review of Systems   Respiratory: Positive for cough (dry).    All other systems reviewed and are negative.          Objective   VITAL SIGNS:   Vitals:    03/23/22 1448   BP: (!) 191/84   Pulse: 66   Resp: 18   Temp: 97.3 °F (36.3 °C)   TempSrc: Temporal   SpO2: 96%   Weight: 71.9 kg (158 lb 9.6 oz)   Height: 160 cm (63\")   PainSc: 0-No pain        Karnofsky score: 80       Physical Exam:   Physical Exam  Vitals reviewed.   Constitutional:       Appearance: Normal appearance.   Cardiovascular:      Rate and Rhythm: Normal rate and regular rhythm.      Heart sounds: Normal heart sounds.   Pulmonary:      Effort: Pulmonary effort is normal.      Breath sounds: Normal breath sounds.   Neurological:      General: No focal deficit present.      Mental Status: She is alert.      Cranial Nerves: No cranial nerve deficit.              The following portions of the patient's history were reviewed and updated as appropriate: allergies, current medications, past family history, past medical history, past social history, past surgical history and problem list.    Assessment:   Assessment     Ms. Jama is a 76 y.o. 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  There was concern of occult intra-abdominal disease and she recently underwent diagnostic laparoscopy that was negative for carcinomatosis of tumor or implants.  CT of the chest on 1/6/2022 revealed an irregular lobulated homogenous 3.7 x 2.1 cm nodule located along the fissure at the left lung base.  Biopsy of the mass was positive for metastatic adenocarcinoma that was not consistent with breast cancer.  I have been asked to see her regarding CyberKnife SBRT to the lung mass.              RECOMMENDATIONS: I spent time with Ms. Jama and her  today discussing CyberKnife SBRT for the lung nodule that is pathologically consistent with metastatic disease from the endometrium.  She is a very good candidate for stereotactic body radiotherapy using the CyberKnife treatment unit.  This will yield a high chance of controlling the involved site of disease and limit potential toxicities.  After full explanation of the risks, benefits and logistics of CyberKnife SBRT she was agreeable and signed form consent today.  She is scheduled to return to our clinic next week to undergo a treatment simulation planning session.  Anticipate we will be able to treat to a dose of approximately 25 Spear in 1 fraction or fractionate in 3-4 fractions to 45-54 Gray depending on our ability to limit the radiation dose to the uninvolved normal lung, heart, esophagus and chest wall.  We hope to start treatment soon after.  Thank you very much for asking me to see this very pleasant patient.          I spent a total of 45 minutes on todays visit, with more than 20 minutes in direct face to face communication, and the remainder of the time spent in reviewing the relevant history, records, available imaging, and for documentation.    Follow Up:   Return for Radiation Treatment/planning.  Diagnoses and all orders for this visit:    1. Malignant neoplasm metastatic to  left lung (HCC) (Primary)

## 2022-03-28 ENCOUNTER — LAB (OUTPATIENT)
Dept: PREADMISSION TESTING | Facility: HOSPITAL | Age: 77
End: 2022-03-28

## 2022-03-28 LAB — SARS-COV-2 RNA PNL SPEC NAA+PROBE: NOT DETECTED

## 2022-03-28 PROCEDURE — C9803 HOPD COVID-19 SPEC COLLECT: HCPCS | Performed by: SURGERY

## 2022-03-28 PROCEDURE — U0004 COV-19 TEST NON-CDC HGH THRU: HCPCS | Performed by: SURGERY

## 2022-03-30 ENCOUNTER — LAB REQUISITION (OUTPATIENT)
Dept: LAB | Facility: HOSPITAL | Age: 77
End: 2022-03-30

## 2022-03-30 DIAGNOSIS — C50.411 MALIGNANT NEOPLASM OF UPPER-OUTER QUADRANT OF RIGHT FEMALE BREAST: ICD-10-CM

## 2022-03-30 PROCEDURE — 88307 TISSUE EXAM BY PATHOLOGIST: CPT | Performed by: SURGERY

## 2022-03-31 ENCOUNTER — HOSPITAL ENCOUNTER (OUTPATIENT)
Dept: RADIATION ONCOLOGY | Facility: HOSPITAL | Age: 77
Discharge: HOME | End: 2022-03-31

## 2022-03-31 PROCEDURE — 77399 UNLISTED PX MED RADJ PHYSICS: CPT | Performed by: RADIOLOGY

## 2022-03-31 PROCEDURE — 77332 RADIATION TREATMENT AID(S): CPT | Performed by: RADIOLOGY

## 2022-03-31 PROCEDURE — 77290 THER RAD SIMULAJ FIELD CPLX: CPT | Performed by: RADIOLOGY

## 2022-04-01 ENCOUNTER — HOSPITAL ENCOUNTER (OUTPATIENT)
Dept: RADIATION ONCOLOGY | Facility: HOSPITAL | Age: 77
Setting detail: RADIATION/ONCOLOGY SERIES
Discharge: HOME | End: 2022-04-01

## 2022-04-06 PROCEDURE — 77295 3-D RADIOTHERAPY PLAN: CPT | Performed by: RADIOLOGY

## 2022-04-06 PROCEDURE — 77300 RADIATION THERAPY DOSE PLAN: CPT | Performed by: RADIOLOGY

## 2022-04-06 PROCEDURE — 77334 RADIATION TREATMENT AID(S): CPT | Performed by: RADIOLOGY

## 2022-04-19 LAB
CYTO UR: NORMAL
LAB AP CARIS, ADDENDUM: NORMAL
LAB AP CASE REPORT: NORMAL
LAB AP CLINICAL INFORMATION: NORMAL
LAB AP DIAGNOSIS COMMENT: NORMAL
LAB AP SPECIAL STAINS: NORMAL
PATH REPORT.FINAL DX SPEC: NORMAL
PATH REPORT.GROSS SPEC: NORMAL

## 2022-04-20 ENCOUNTER — CONSULT (OUTPATIENT)
Dept: ONCOLOGY | Facility: CLINIC | Age: 77
End: 2022-04-20

## 2022-04-20 VITALS
TEMPERATURE: 97.1 F | HEART RATE: 62 BPM | BODY MASS INDEX: 28.7 KG/M2 | WEIGHT: 162 LBS | OXYGEN SATURATION: 98 % | SYSTOLIC BLOOD PRESSURE: 159 MMHG | HEIGHT: 63 IN | DIASTOLIC BLOOD PRESSURE: 84 MMHG | RESPIRATION RATE: 16 BRPM

## 2022-04-20 DIAGNOSIS — C54.1 ENDOMETRIAL CANCER: Primary | ICD-10-CM

## 2022-04-20 PROBLEM — C78.02 MALIGNANT NEOPLASM METASTATIC TO LEFT LUNG: Status: RESOLVED | Noted: 2022-03-23 | Resolved: 2022-04-20

## 2022-04-20 PROCEDURE — 99215 OFFICE O/P EST HI 40 MIN: CPT | Performed by: INTERNAL MEDICINE

## 2022-04-20 RX ORDER — ONDANSETRON HYDROCHLORIDE 8 MG/1
8 TABLET, FILM COATED ORAL 3 TIMES DAILY PRN
Qty: 30 TABLET | Refills: 5 | Status: SHIPPED | OUTPATIENT
Start: 2022-04-20 | End: 2022-07-20

## 2022-04-20 NOTE — PROGRESS NOTES
PROBLEM LIST:  Oncology/Hematology History   Endometrial cancer (HCC)   9/30/2011 Imaging    TVUS and CT scan for palpable left pelvic mass upon annual exam and new abdominal symptoms.      10/6/2011 Surgery    ADY/BSO with pelvic lymph node dissection. Stage 1A grade 2 endometrial adenocarcinoma by final pathology     1/26/2022 Imaging    IMPRESSION:     Hypermetabolic soft tissue nodule in the left breast compatible with  biopsy-proven invasive ductal carcinoma. There is diffuse low level FDG  uptake in the region of recent right breast biopsy site. A  hypermetabolic mass in the left lower lobe is suspicious for pulmonary  metastasis. No additional sites of metastases are identified. There is  no evidence of discrete/focal hypermetabolic lesion of the sternum to  correspond with the indeterminant sternal lesion described on breast MRI  exam.     2/14/2022 Biopsy    Final Diagnosis   LEFT LUNG, BIOPSY:  Metastatic adenocarcinoma.  See comment.  GJK   Electronically signed by Wolfagng Hair MD on 2/22/2022 at 1154   Comment    Sections show small fragments of tumor that are morphologically distinct from the patient's known breast cancers.  Immunoprofile suggests mullerian origin.  Clinical correlation required.  This case was shown for intradepartmental consultation and the other pathologist concurs with the findings interpretation.  This case was discussed with Dr. Trevino on 02/21/22.  This case was discussed with Dr. Reed on 02/22/22.        5/4/2022 - 5/4/2022 Chemotherapy    OP BREAST CARBOplatin AUC=2 (weekly)     5/4/2022 -  Chemotherapy    OP GYN PACLitaxel / CARBOplatin AUC=2 (Weekly)     Malignant neoplasm of upper-outer quadrant of both breasts in female, estrogen receptor positive (HCC)   1/21/2022 Initial Diagnosis    Malignant neoplasm of upper-outer quadrant of both breasts in female, estrogen receptor positive (HCC)     1/24/2022 Biopsy    1.  Right breast cancer-invasive ductal carcinoma grade  2-ER negative FL negative HER-2 negative    2.  Left breast cancer-invasive ductal carcinoma grade 2-ER positive FL negative HER-2 negative     1/26/2022 Imaging    EXAMINATION: NM PET/CT SKULL BASE TO MID THIGH      FINDINGS:      Today's 3-D images demonstrate focal hypermetabolism in the soft tissues  of the left breast as well as focal hypermetabolism within the left  midlung.     Multiplanar images of the head and neck demonstrate symmetric FDG uptake  within the brain. There is no evidence of hypermetabolic neck mass or  lymph node.     In the upper outer quadrant of the left breast there is redemonstration  of a irregular 2.1 cm soft tissue nodule with FDG hypermetabolism of SUV  max 4.2, compatible with biopsy-proven invasive ductal carcinoma. There  is an ill-defined diffuse region of low-level hypermetabolism in the  right breast with adjacent biopsy clip and single locule of soft tissue  air, compatible with recent right breast biopsy. A discrete  hypermetabolic nodule or mass in the right breast is not confidently  identified. There is no hypermetabolic or enlarged axillary lymph nodes.     Within the chest there is redemonstration of a lobulated soft tissue  mass in the superior aspect of the left lower lobe which appears  hypermetabolic with SUV max 6.5. There is no evidence of hypermetabolic  mediastinal or hilar adenopathy.     Below the diaphragm there is expected physiologic uptake within the   and GI tracts. No evidence of abdominal pelvic malignancy or metastasis.  No hypermetabolic abdominopelvic lymph nodes. Photopenic left renal cyst  is noted.     There is no hypermetabolic pathologic marrow process. Specifically,  there is no evidence of hypermetabolic lesion of the sternum to  correspond with the indeterminant sternal lesion detailed on prior  breast MRI exam. FDG uptake at the left antecubital fossa reflects site  of IV administration.     IMPRESSION:     Hypermetabolic soft tissue  nodule in the left breast compatible with  biopsy-proven invasive ductal carcinoma. There is diffuse low level FDG  uptake in the region of recent right breast biopsy site. A  hypermetabolic mass in the left lower lobe is suspicious for pulmonary  metastasis. No additional sites of metastases are identified. There is  no evidence of discrete/focal hypermetabolic lesion of the sternum to  correspond with the indeterminant sternal lesion described on breast MRI  exam.               REASON FOR VISIT: Management of my cancers     HISTORY OF PRESENT ILLNESS:   76 y.o.  female presents today for follow-up after undergoing bilateral lumpectomies for her bilateral breast cancer.  She is also scheduled to undergo CyberKnife to a metastatic focus of endometrial cancer in her lung.  She is healed up from her surgeries reasonably well.  Denies any issues with infections.  No nausea vomiting diarrhea.  No issues with headaches.    Past medical history, social history and family history was reviewed 04/20/22 and unchanged from prior visit.    Review of Systems:    Review of Systems - Oncology         Medications:        Current Outpatient Medications:   •  acetaminophen (Tylenol 8 Hour) 650 MG 8 hr tablet, Take 1 tablet by mouth Every 8 (Eight) Hours As Needed for Mild Pain ., Disp: 40 tablet, Rfl: 0  •  carvedilol (COREG) 12.5 MG tablet, Take 12.5 mg by mouth 2 (Two) Times a Day With Meals., Disp: , Rfl:   •  ezetimibe (ZETIA) 10 MG tablet, Take 10 mg by mouth Every Night., Disp: , Rfl:   •  ferrous sulfate 325 (65 FE) MG tablet, Take 325 mg by mouth Daily With Breakfast., Disp: , Rfl:   •  ibuprofen (ADVIL,MOTRIN) 600 MG tablet, Take 1 tablet by mouth Every 6 (Six) Hours As Needed for Mild Pain ., Disp: 15 tablet, Rfl: 0  •  letrozole (Femara) 2.5 MG tablet, Take 1 tablet by mouth Daily for 90 days., Disp: 90 tablet, Rfl: 3  •  lisinopril (PRINIVIL,ZESTRIL) 40 MG tablet, Take 40 mg by mouth Daily., Disp: , Rfl:   •  NIFEdipine  "XL (PROCARDIA XL) 30 MG 24 hr tablet, Take 30 mg by mouth Daily., Disp: , Rfl:   •  pantoprazole (PROTONIX) 40 MG EC tablet, , Disp: , Rfl:   •  pravastatin (PRAVACHOL) 80 MG tablet, , Disp: , Rfl: 1  •  ursodiol (ACTIGALL) 500 MG tablet, , Disp: , Rfl:   •  ondansetron (ZOFRAN) 8 MG tablet, Take 1 tablet by mouth 3 (Three) Times a Day As Needed for Nausea or Vomiting., Disp: 30 tablet, Rfl: 5    Current Facility-Administered Medications:   •  lidocaine (XYLOCAINE) 1 % injection 5 mL, 5 mL, Infiltration, Once, Svetlana Juárez MD    Pain Medications             acetaminophen (Tylenol 8 Hour) 650 MG 8 hr tablet Take 1 tablet by mouth Every 8 (Eight) Hours As Needed for Mild Pain .    ibuprofen (ADVIL,MOTRIN) 600 MG tablet Take 1 tablet by mouth Every 6 (Six) Hours As Needed for Mild Pain .             ALLERGIES:  No Known Allergies      Physical Exam    VITAL SIGNS:  /84   Pulse 62   Temp 97.1 °F (36.2 °C) (Temporal)   Resp 16   Ht 160 cm (63\")   Wt 73.5 kg (162 lb)   SpO2 98%   BMI 28.70 kg/m²     ECOG score: 0           Wt Readings from Last 3 Encounters:   04/20/22 73.5 kg (162 lb)   03/23/22 71.7 kg (158 lb)   03/23/22 71.9 kg (158 lb 9.6 oz)       Body mass index is 28.7 kg/m². Body surface area is 1.77 meters squared.    Pain Score    04/20/22 1338   PainSc: 0-No pain           Performance Status: 0    General: well appearing, in no acute distress  HEENT: sclera anicteric, neck is supple  Lymphatics: no cervical, supraclavicular, or axillary adenopathy  Cardiovascular: regular rate and rhythm, no murmurs, rubs or gallops  Lungs: clear to auscultation bilaterally  Abdomen: soft, nontender, nondistended.  No palpable organomegaly  Extremities: no lower extremity edema  Skin: no rashes, lesions, bruising, or petechiae  Msk:  Shows no weakness of the large muscle groups  Psych: Mood is stable        RECENT LABS:    Lab Results   Component Value Date    HGB 12.6 03/15/2022    HCT 36.6 03/15/2022    MCV " 88.6 03/15/2022     03/15/2022    WBC 9.05 03/15/2022    NEUTROABS 5.38 02/14/2022    LYMPHSABS 1.37 02/14/2022    MONOSABS 0.79 02/14/2022    EOSABS 0.09 02/14/2022    BASOSABS 0.04 02/14/2022       Lab Results   Component Value Date    GLUCOSE 103 (H) 03/15/2022    BUN 18 03/15/2022    CREATININE 0.85 03/15/2022     (L) 03/15/2022    K 4.6 03/15/2022    CL 98 03/15/2022    CO2 26.0 03/15/2022    CALCIUM 9.4 03/15/2022    PROTEINTOT 7.2 03/15/2022    ALBUMIN 4.20 03/15/2022    BILITOT 0.6 03/15/2022    ALKPHOS 63 03/15/2022    AST 15 03/15/2022    ALT 15 03/15/2022       CT Chest With Contrast Diagnostic    Result Date: 1/10/2022  CT demonstrates no specific osseous correlate to the sternal lesion noted on recent MRI. No corresponding lytic or sclerotic osseous lesion is present. There is no evidence of cortical breakthrough.  An irregular lobulated homogeneous 3.7 x 2.1 cm nodule is noted along the fissure the left lung base. Findings remain suspicious for metastatic involvement, however granulomatous process or possibly pulmonary AVM could have similar appearance. PET/CT would be useful to characterize but this finding and further evaluate for possible CT occult sternal osseous metastasis.  Redemonstration of bilateral soft tissue breast masses concerning for malignancy, better characterized on recent MRI.  This report was finalized on 1/10/2022 3:34 PM by Rodríguez Bacon.      IR Fluoro Guidance Only for Central Line    Result Date: 3/16/2022  10 seconds intraoperative fluoroscopy time during port placement performed by Dr. Reed. Details the procedure can be found in Dr. Reed's note.  This report was finalized on 3/16/2022 2:10 PM by Shemar Ulrich MD.      XR Chest 1 View    Result Date: 3/16/2022  No pneumothorax status post port placement  Atelectasis in the lower left lung  This report was finalized on 3/16/2022 1:22 PM by Pepito Leblanc.      XR Chest 1 View    Result Date: 2/14/2022  No  "pneumothorax.  This report was finalized on 2/14/2022 2:29 PM by Rodríguez Bacon.      XR Chest 1 View    Result Date: 2/14/2022  Left lower lobe pulmonary nodule faintly seen. There is no pneumothorax or other evident immediate complication. Unchanged heart and mediastinal contours.  This report was finalized on 2/14/2022 1:06 PM by Rodríguez Bacon.      CT Needle Biopsy Lung    Result Date: 2/16/2022  Impression:                                                              Successful CT guided core biopsy of a mass in the left lobe of the lung as described above.  Thank you for the opportunity to assist in the care of your patient.  This report was finalized on 2/16/2022 8:39 AM by Geovani Wilkins MD.      Mammo Post Clip Placement Right    Result Date: 1/27/2022  Findings highly suggestive of malignancy at the 11:00 position of the right breast both mammographically and sonographically.   BI-RADS CATEGORY:  5, HIGHLY SUGGESTIVE OF MALIGNANCY   RECOMMENDATION:  Ultrasound-guided biopsy  CAD was utilized.     10G ELEVATION VACUUM-ASSISTED ULTRASOUND GUIDED CORE BIOPSY    History: Findings highly suggestive of malignancy at the right 11-11 30 position  Procedure: Written and verbal consent was obtained for ultrasound guided core biopsy of a 2 to 3 cm centimeter ill-defined spiculated area corresponding to MRI nonmass enhancement at the right 11-11 30 position \" Time out\" was observed to verify the patient's identity and correct location of the breast abnormality. The presence of the lesion was confirmed ultrasound and a lateral approach was chosen. The breast was prepped and draped in the usual sterile fashion and 10 mL 1% lidocaine with and without epinephrine was utilized for local anesthesia. A small skin incision was made with a scalpel and a 10-gauge needle with an overlying sheath attached to the core biopsy device was introduced into the breast under direct sonographic guidance. The needle was placed within " to the mass. A total of 6 core samples were obtained. The specimens were placed in formalin and forwarded to the pathology department. A post biopsy marking clip was placed. Post biopsy mammographic images were obtained. The clip was noted to be in excellent position in the anterior aspect of the 2 adjacent areas of nonmass enhancement seen by MRI  Upon completion of the procedure, compression was applied to the biopsy site until all appreciable bleeding subsided and a sterile dressing was applied. Post biopsy instructions were reviewed with the patient by our clinical breast imaging staff. A written copy of these instructions was also given to the patient. The patient tolerated the procedure well and no immediate complications occurred.   SUMMARY: 10-gauge ultrasound guided and vacuum assisted core biopsy of a 2-3 cm right breast mass located at the 11-11 30 position.  A post biopsy marking clip was placed.  PATHOLOGY: Invasive ductal carcinoma ER negative, AR negative, HER-2/mary equivocal. Findings are concordant. The patient will be referred to back to [facility]  This report was finalized on 1/27/2022 11:40 AM by Dr. Svetlana Juárez MD.      MRI Breast Bilateral Diagnostic With & Without Contrast    Result Date: 12/30/2021  1. Known biopsy-proven malignancy on the left 2. Findings highly suggestive of malignancy on the right as described 3. Indeterminate sternal lesion measuring almost 2 cm with questionable expansion/erosion of the anterior sternal cortex. Dedicated bone imaging is recommended.  RECOMMENDATIONS: 1. Diagnostic right mammography and sonography for evaluation for biopsy of the adjacent areas of concern in the right upper outer quadrant. 2. Dedicated bone evaluation of the sternal lesion.  BI-RADS CATEGORY: 5, HIGHLY SUGGESTIVE OF MALIGNANCY  FINAL MRI RESULTS AND RECOMMENDATIONS WILL BE CALLED TO THE PATIENT BY A BREAST CARE NURSE.  This report was finalized on 12/30/2021 11:48 AM by   Svetlana Juárez MD.      NM PET/CT Skull Base to Mid Thigh    Result Date: 1/26/2022   Hypermetabolic soft tissue nodule in the left breast compatible with biopsy-proven invasive ductal carcinoma. There is diffuse low level FDG uptake in the region of recent right breast biopsy site. A hypermetabolic mass in the left lower lobe is suspicious for pulmonary metastasis. No additional sites of metastases are identified. There is no evidence of discrete/focal hypermetabolic lesion of the sternum to correspond with the indeterminant sternal lesion described on breast MRI exam.   This report was finalized on 1/26/2022 3:33 PM by Anil Saha MD.      Mammo Breast Placement Device Initial Without Biopsy    Result Date: 3/19/2022  Successful needle localization of the known malignancy in the left upper outer quadrant.  Surgical excision will be performed by Dr. Reed.  FINAL PATHOLOGY: Invasive poorly differentiated ductal carcinoma. Gross tumor size 2.8 cm. 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 Positive margins for invasive carcinoma. Findings are concordant. The patient is being followed by [facility].  This report was finalized on 3/19/2022 11:26 AM by Dr. Svetlana Juárez MD.      Mammo Breast Placement Device Initial Without Biopsy    Result Date: 3/19/2022  Successful needle localization of the known right upper outer quadrant malignancy.  Surgical excision will be performed by Dr. Reed.  FINAL PATHOLOGY OF THE RIGHT BREAST: Invasive moderately differentiated ductal carcinoma. Gross tumor size 11 mm. Focal high-grade DCIS. Invasive carcinoma present at the deep/posterior surgical margin. The patient is being cared for by [facility].  This report was finalized on 3/19/2022 11:24 AM by Dr. Svetlana Juárez MD.      Mammo Diagnostic Digital Tomosynthesis Right With CAD    Result Date: 1/27/2022  Findings highly suggestive of malignancy at the 11:00 position of the right breast  "both mammographically and sonographically.   BI-RADS CATEGORY:  5, HIGHLY SUGGESTIVE OF MALIGNANCY   RECOMMENDATION:  Ultrasound-guided biopsy  CAD was utilized.     10G ELEVATION VACUUM-ASSISTED ULTRASOUND GUIDED CORE BIOPSY    History: Findings highly suggestive of malignancy at the right 11-11 30 position  Procedure: Written and verbal consent was obtained for ultrasound guided core biopsy of a 2 to 3 cm centimeter ill-defined spiculated area corresponding to MRI nonmass enhancement at the right 11-11 30 position \" Time out\" was observed to verify the patient's identity and correct location of the breast abnormality. The presence of the lesion was confirmed ultrasound and a lateral approach was chosen. The breast was prepped and draped in the usual sterile fashion and 10 mL 1% lidocaine with and without epinephrine was utilized for local anesthesia. A small skin incision was made with a scalpel and a 10-gauge needle with an overlying sheath attached to the core biopsy device was introduced into the breast under direct sonographic guidance. The needle was placed within to the mass. A total of 6 core samples were obtained. The specimens were placed in formalin and forwarded to the pathology department. A post biopsy marking clip was placed. Post biopsy mammographic images were obtained. The clip was noted to be in excellent position in the anterior aspect of the 2 adjacent areas of nonmass enhancement seen by MRI  Upon completion of the procedure, compression was applied to the biopsy site until all appreciable bleeding subsided and a sterile dressing was applied. Post biopsy instructions were reviewed with the patient by our clinical breast imaging staff. A written copy of these instructions was also given to the patient. The patient tolerated the procedure well and no immediate complications occurred.   SUMMARY: 10-gauge ultrasound guided and vacuum assisted core biopsy of a 2-3 cm right breast mass located at " "the 11-11 30 position.  A post biopsy marking clip was placed.  PATHOLOGY: Invasive ductal carcinoma ER negative, MI negative, HER-2/mary equivocal. Findings are concordant. The patient will be referred to back to [facility]  This report was finalized on 1/27/2022 11:40 AM by Dr. Svetlana Juárez MD.      US Guided Breast Biopsy With & Without Device initial    Result Date: 1/27/2022  Findings highly suggestive of malignancy at the 11:00 position of the right breast both mammographically and sonographically.   BI-RADS CATEGORY:  5, HIGHLY SUGGESTIVE OF MALIGNANCY   RECOMMENDATION:  Ultrasound-guided biopsy  CAD was utilized.     10G ELEVATION VACUUM-ASSISTED ULTRASOUND GUIDED CORE BIOPSY    History: Findings highly suggestive of malignancy at the right 11-11 30 position  Procedure: Written and verbal consent was obtained for ultrasound guided core biopsy of a 2 to 3 cm centimeter ill-defined spiculated area corresponding to MRI nonmass enhancement at the right 11-11 30 position \" Time out\" was observed to verify the patient's identity and correct location of the breast abnormality. The presence of the lesion was confirmed ultrasound and a lateral approach was chosen. The breast was prepped and draped in the usual sterile fashion and 10 mL 1% lidocaine with and without epinephrine was utilized for local anesthesia. A small skin incision was made with a scalpel and a 10-gauge needle with an overlying sheath attached to the core biopsy device was introduced into the breast under direct sonographic guidance. The needle was placed within to the mass. A total of 6 core samples were obtained. The specimens were placed in formalin and forwarded to the pathology department. A post biopsy marking clip was placed. Post biopsy mammographic images were obtained. The clip was noted to be in excellent position in the anterior aspect of the 2 adjacent areas of nonmass enhancement seen by MRI  Upon completion of the procedure, " compression was applied to the biopsy site until all appreciable bleeding subsided and a sterile dressing was applied. Post biopsy instructions were reviewed with the patient by our clinical breast imaging staff. A written copy of these instructions was also given to the patient. The patient tolerated the procedure well and no immediate complications occurred.   SUMMARY: 10-gauge ultrasound guided and vacuum assisted core biopsy of a 2-3 cm right breast mass located at the 11-11 30 position.  A post biopsy marking clip was placed.  PATHOLOGY: Invasive ductal carcinoma ER negative, TN negative, HER-2/mary equivocal. Findings are concordant. The patient will be referred to back to [facility]  This report was finalized on 1/27/2022 11:40 AM by Dr. Svetlana Juárez MD.      Final Diagnosis   1.  RIGHT BREAST, NEEDLE LOCALIZED LUMPECTOMY:                 Invasive moderately differentiated ductal carcinoma with apocrine morphology (Woodrow score 7/9)                 Gross tumor size equal 11 mm                 Biopsy site identified                 Focal high-grade ductal carcinoma in situ identified                 Invasive carcinoma present at the deep/posterior surgical margin.  See template #1    2.  LEFT BREAST, NEEDLE LOCALIZED LUMPECTOMY:                 Invasive poorly differentiated ductal carcinoma (Woodrow score 8/9)                 Gross tumor size 28 mm                Background high-grade ductal carcinoma in situ with involvement of papillomatous lesion                 Invasive ductal carcinoma less than 1 mm from superior margin and medial margin                 Invasive ductal carcinoma 2 mm from inferior margin                 Ductal carcinoma in situ extends to posterior margin                 Biopsy cavity identified.  See template #2    3.  LEFT BREAST, EXTENDED MEDIAL, SUPERIOR, AND DEEP MARGIN:                 Focal residual carcinoma measuring 3 mm near anterior aspect                New  anterior margin width is 9 mm    INVASIVE BREAST CANCER STAGING TEMPLATE #1    TYPE OF SPECIMEN/PROCEDURE: Needle localized lumpectomy  SPECIMEN LATERALITY: Right  TUMOR SITE: 11:00  TUMOR SIZE: 11 mm  TUMOR FOCALITY: Single focus  HISTOLOGIC TYPE OF INVASIVE CARCINOMA: Ductal with apocrine morphology  rdGrdRrdArdDrdErd:rd rd3rd Tubule formation: 3                Mitotic activity: 1                Pleomorphism: 3  OVERALL SCORE: 7/9  DUCTAL CARCINOMA IN SITU EXTENT: Focal  TUMOR EXTENSION: Structures not present                Skin: N/A                Skin satellite nodule: N/A                Nipple: N/a                Skeletal muscle: N/A  SURGICAL MARGINS: Invasive carcinoma present at margin                Invasive carcinoma margins: Tumor present at margin                Distance from closest margin: 0 mm                Specific closest margin: Posterior                   DCIS margins: Uninvolved by DCIS                Distance from closest margin: 6 mm                Specific closest margin: Posterior    LYMPH NODE STATUS: None submitted                Number of lymph nodes with macrometastasis: N/A                Number of lymph nodes with micrometastasis: N/A                Number of lymph nodes with isolated tumor cells: N/A  TOTAL NUMBER OF LYMPH NODES EXAMINED: 0  NUMBER OF SENTINEL NODES EXAMINED: 0  EXTRANODAL EXTENSION OF TUMOR: N/A  SIZE OF LARGEST ENOCH METASTATIC DEPOSIT: N/A  VASCULAR/LYMPHATIC INVASION: N/A  DISTANT SITES INVOLVED: Not applicable  ESTROGEN RECEPTOR STATUS BY IHC METHOD: Negative per previous biopsy  PROGESTERONE RECEPTOR STATUS BY IHC METHOD: Negative per previous biopsy  HER-2/LAURA ONCOPROTEIN STATUS BY IHC METHOD: Equivocal (2+) per previous biopsy  HER-2/LAURA ONCOGENE STATUS BY IN SITU HYBRIDIZATION ANALYSIS: Not amplified  BIOPSY UNDER WHICH BREAST BIOMARKERS PERFORMED: SX   TREATMENT EFFECT (BREAST): No known presurgical therapy  TREATMENT EFFECT (LYMPH NODE): No known  presurgical therapy  ADDITIONAL PATHOLOGIC FINDINGS: Fibrocystic change  OTHER STUDIES: Available upon request  AJCC PATHOLOGIC STAGE: (COMPLETED BY PATHOLOGIST, BASED ONLY ON TISSUE FINDINGS; MORE EXTENSIVE DISEASE MAY NOT BE KNOWN TO THE PATHOLOGIST)  pT = 1c  pN = x  pM = N/A      INVASIVE BREAST CANCER STAGING TEMPLATE #2    TYPE OF SPECIMEN/PROCEDURE: Needle localized lumpectomy  SPECIMEN LATERALITY: Left  TUMOR SITE: 2:00  TUMOR SIZE: 28 mm  TUMOR FOCALITY: Single focus  HISTOLOGIC TYPE OF INVASIVE CARCINOMA: Ductal  ndGndRndAndDndEnd:nd nd2nd Tubule formation: 3                Mitotic activity: 2                Pleomorphism: 3  OVERALL SCORE: 8/9  DUCTAL CARCINOMA IN SITU EXTENT: 12 mm  TUMOR EXTENSION: Structures not present                Skin: N/A                Skin satellite nodule: N/A                Nipple: N/A                Skeletal muscle: N/A  SURGICAL MARGINS: Final margins uninvolved by in situ or invasive neoplasm                Invasive carcinoma margins: Extended margin uninvolved                Distance from closest margin: Extended margin 9 mm                Specific closest margin: Anterior                   DCIS margins: Uninvolved                Distance from closest margin: 12 mm                Specific closest margin: Posterior    LYMPH NODE STATUS: No lymph nodes submitted                Number of lymph nodes with macrometastasis: N/A                Number of lymph nodes with micrometastasis: N/A                Number of lymph nodes with isolated tumor cells: N/A  TOTAL NUMBER OF LYMPH NODES EXAMINED: 0  NUMBER OF SENTINEL NODES EXAMINED: 0  EXTRANODAL EXTENSION OF TUMOR: N/A  SIZE OF LARGEST ENOCH METASTATIC DEPOSIT: N/A  VASCULAR/LYMPHATIC INVASION: N/A  DISTANT SITES INVOLVED: N/A  ESTROGEN RECEPTOR STATUS BY IHC METHOD: Positive per previous biopsy  PROGESTERONE RECEPTOR STATUS BY IHC METHOD: Negative per previous biopsy  HER-2/LAURA ONCOPROTEIN STATUS BY IHC METHOD: Negative (0+) per  previous biopsy   HER-2/LAURA ONCOGENE STATUS BY IN SITU HYBRIDIZATION ANALYSIS: Not performed  BIOPSY UNDER WHICH BREAST BIOMARKERS PERFORMED: [id number]  TREATMENT EFFECT (BREAST): No known presurgical therapy  TREATMENT EFFECT (LYMPH NODE): No known presurgical therapy  ADDITIONAL PATHOLOGIC FINDINGS: None significant  OTHER STUDIES: Available upon request  AJCC PATHOLOGIC STAGE: (COMPLETED BY PATHOLOGIST, BASED ONLY ON TISSUE FINDINGS; MORE EXTENSIVE DISEASE MAY NOT BE KNOWN TO THE PATHOLOGIST)  pT = 2  pN = x  pM = not applicable            Assessment/Plan    1.  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  Both sides had lumpectomies performed by Dr. Reed.  She presents today to discuss treatment options going forward.  Complicating the situation is a focus of metastatic endometrial cancer in the lung.  We have presented this case in conference multiple times.  The consensus has been to initially treat with surgery to the breast after which plan for CyberKnife to the metastatic focus in the lung.  I think she would benefit from adjuvant chemotherapy in this situation considering she has a fairly aggressive triple negative breast cancer in the right breast.  I would choose carboplatin and Taxol since it also covers endometrial cancer and would also be a reasonable regimen for her left breast cancer.  I plan to treat her on a weekly basis.  After she completes her chemotherapy then I would recommend radiotherapy to both breasts since they have a treated with lumpectomies.  And she will be placed on hormone blockade with an aromatase inhibitor indefinitely.  I discussed the side effects to be expected.  We had a long discussion about the rationale behind the treatment approach that I have suggested.  We will see how she tolerates the weekly regimen going forward.  She already has a port in place.  We should be able to get started on treatment in couple  weeks here.        Total time of patient care on day of service including time prior to, face to face with patient, and following visit spent in reviewing records, lab results, imaging studies, discussion with patient, and documentation/charting was > 47 minutes.     Ruby Trevino MD  [facility] Hematology and Oncology    Return on: 05/18/22  Return in (Approximately): Schedule with next infusion, 1 month    No orders of the defined types were placed in this encounter.      4/20/2022

## 2022-04-26 ENCOUNTER — HOSPITAL ENCOUNTER (OUTPATIENT)
Dept: RADIATION ONCOLOGY | Facility: HOSPITAL | Age: 77
Discharge: HOME | End: 2022-04-26

## 2022-04-26 PROCEDURE — 77332 RADIATION TREATMENT AID(S): CPT | Performed by: RADIOLOGY

## 2022-04-26 PROCEDURE — 77373 STRTCTC BDY RAD THER TX DLVR: CPT | Performed by: RADIOLOGY

## 2022-04-26 PROCEDURE — 77290 THER RAD SIMULAJ FIELD CPLX: CPT | Performed by: RADIOLOGY

## 2022-04-26 PROCEDURE — 77336 RADIATION PHYSICS CONSULT: CPT | Performed by: RADIOLOGY

## 2022-04-28 ENCOUNTER — HOSPITAL ENCOUNTER (OUTPATIENT)
Age: 77
End: 2022-04-28
Payer: MEDICARE

## 2022-04-28 DIAGNOSIS — K74.3: Primary | ICD-10-CM

## 2022-04-28 LAB
ALBUMIN LEVEL: 3.6 G/DL (ref 3.5–5)
ALBUMIN/GLOB SERPL: 1.2 {RATIO} (ref 1.1–1.8)
ALP ISO SERPL-ACNC: 77 U/L (ref 38–126)
ALT SERPLBLD-CCNC: 20 U/L (ref 12–78)
ANION GAP SERPL CALC-SCNC: 10.2 MEQ/L (ref 5–15)
AST SERPL QL: 26 U/L (ref 14–36)
BILIRUBIN,TOTAL: 0.9 MG/DL (ref 0.2–1.3)
BUN SERPL-MCNC: 14 MG/DL (ref 7–17)
CALCIUM SPEC-MCNC: 9.2 MG/DL (ref 8.4–10.2)
CHLORIDE SPEC-SCNC: 100 MMOL/L (ref 98–107)
CO2 SERPL-SCNC: 27 MMOL/L (ref 22–30)
CREAT BLD-SCNC: 0.8 MG/DL (ref 0.52–1.04)
ESTIMATED GLOMERULAR FILT RATE: 70 ML/MIN (ref 60–?)
GFR (AFRICAN AMERICAN): 85 ML/MIN (ref 60–?)
GLOBULIN SER CALC-MCNC: 3.1 G/DL (ref 1.3–3.2)
GLUCOSE: 125 MG/DL (ref 74–100)
HCT VFR BLD CALC: 32.7 % (ref 37–47)
HGB BLD-MCNC: 11.2 G/DL (ref 12.2–16.2)
MCHC RBC-ENTMCNC: 34.3 G/DL (ref 31.8–35.4)
MCV RBC: 89.1 FL (ref 81–99)
MEAN CORPUSCULAR HEMOGLOBIN: 30.5 PG (ref 27–31.2)
PLATELET # BLD: 169 K/MM3 (ref 142–424)
POTASSIUM: 4.2 MMOL/L (ref 3.5–5.1)
PROT SERPL-MCNC: 6.7 G/DL (ref 6.3–8.2)
RBC # BLD AUTO: 3.67 M/MM3 (ref 4.2–5.4)
SODIUM SPEC-SCNC: 133 MMOL/L (ref 136–145)
WBC # BLD AUTO: 8.1 K/MM3 (ref 4.8–10.8)

## 2022-04-28 PROCEDURE — 85025 COMPLETE CBC W/AUTO DIFF WBC: CPT

## 2022-04-28 PROCEDURE — 80053 COMPREHEN METABOLIC PANEL: CPT

## 2022-04-28 PROCEDURE — 36415 COLL VENOUS BLD VENIPUNCTURE: CPT

## 2022-04-29 DIAGNOSIS — C54.1 ENDOMETRIAL CANCER: Primary | ICD-10-CM

## 2022-05-02 ENCOUNTER — OFFICE VISIT (OUTPATIENT)
Dept: ONCOLOGY | Facility: CLINIC | Age: 77
End: 2022-05-02

## 2022-05-02 ENCOUNTER — EDUCATION (OUTPATIENT)
Dept: ONCOLOGY | Facility: HOSPITAL | Age: 77
End: 2022-05-02

## 2022-05-02 ENCOUNTER — HOSPITAL ENCOUNTER (OUTPATIENT)
Dept: ONCOLOGY | Facility: HOSPITAL | Age: 77
Setting detail: INFUSION SERIES
Discharge: HOME | End: 2022-05-02

## 2022-05-02 ENCOUNTER — HOSPITAL ENCOUNTER (OUTPATIENT)
Dept: ONCOLOGY | Facility: HOSPITAL | Age: 77
Discharge: HOME | End: 2022-05-02

## 2022-05-02 VITALS
WEIGHT: 159 LBS | HEART RATE: 68 BPM | BODY MASS INDEX: 28.17 KG/M2 | SYSTOLIC BLOOD PRESSURE: 139 MMHG | TEMPERATURE: 98.1 F | OXYGEN SATURATION: 98 % | RESPIRATION RATE: 18 BRPM | DIASTOLIC BLOOD PRESSURE: 67 MMHG | HEIGHT: 63 IN

## 2022-05-02 VITALS
SYSTOLIC BLOOD PRESSURE: 139 MMHG | TEMPERATURE: 99 F | HEART RATE: 68 BPM | BODY MASS INDEX: 28.24 KG/M2 | RESPIRATION RATE: 18 BRPM | DIASTOLIC BLOOD PRESSURE: 67 MMHG | HEIGHT: 63 IN | WEIGHT: 159.4 LBS

## 2022-05-02 DIAGNOSIS — C50.412 MALIGNANT NEOPLASM OF UPPER-OUTER QUADRANT OF BOTH BREASTS IN FEMALE, ESTROGEN RECEPTOR POSITIVE: Primary | ICD-10-CM

## 2022-05-02 DIAGNOSIS — Z17.0 MALIGNANT NEOPLASM OF UPPER-OUTER QUADRANT OF BOTH BREASTS IN FEMALE, ESTROGEN RECEPTOR POSITIVE: Primary | ICD-10-CM

## 2022-05-02 DIAGNOSIS — C50.411 MALIGNANT NEOPLASM OF UPPER-OUTER QUADRANT OF BOTH BREASTS IN FEMALE, ESTROGEN RECEPTOR POSITIVE: Primary | ICD-10-CM

## 2022-05-02 DIAGNOSIS — C54.1 ENDOMETRIAL CANCER: ICD-10-CM

## 2022-05-02 LAB
ALBUMIN SERPL-MCNC: 4.3 G/DL (ref 3.5–5.2)
ALBUMIN/GLOB SERPL: 1.3 G/DL
ALP SERPL-CCNC: 78 U/L (ref 39–117)
ALT SERPL W P-5'-P-CCNC: 12 U/L (ref 1–33)
ANION GAP SERPL CALCULATED.3IONS-SCNC: 9 MMOL/L (ref 5–15)
AST SERPL-CCNC: 16 U/L (ref 1–32)
BILIRUB SERPL-MCNC: 0.6 MG/DL (ref 0–1.2)
BUN SERPL-MCNC: 16 MG/DL (ref 8–23)
BUN/CREAT SERPL: 21.3 (ref 7–25)
CALCIUM SPEC-SCNC: 9.5 MG/DL (ref 8.6–10.5)
CANCER AG125 SERPL QL: 22.7 U/ML (ref 0–38.1)
CHLORIDE SERPL-SCNC: 102 MMOL/L (ref 98–107)
CO2 SERPL-SCNC: 25 MMOL/L (ref 22–29)
CREAT SERPL-MCNC: 0.75 MG/DL (ref 0.57–1)
EGFRCR SERPLBLD CKD-EPI 2021: 83.1 ML/MIN/1.73
ERYTHROCYTE [DISTWIDTH] IN BLOOD BY AUTOMATED COUNT: 12.6 % (ref 12.3–15.4)
GLOBULIN UR ELPH-MCNC: 3.4 GM/DL
GLUCOSE SERPL-MCNC: 119 MG/DL (ref 65–99)
HCT VFR BLD AUTO: 39 % (ref 34–46.6)
HGB BLD-MCNC: 12.1 G/DL (ref 12–15.9)
LYMPHOCYTES # BLD AUTO: 1.3 10*3/MM3 (ref 0.7–3.1)
LYMPHOCYTES NFR BLD AUTO: 14.4 % (ref 19.6–45.3)
MCH RBC QN AUTO: 28.2 PG (ref 26.6–33)
MCHC RBC AUTO-ENTMCNC: 31 G/DL (ref 31.5–35.7)
MCV RBC AUTO: 91.2 FL (ref 79–97)
MONOCYTES # BLD AUTO: 0.7 10*3/MM3 (ref 0.1–0.9)
MONOCYTES NFR BLD AUTO: 7.7 % (ref 5–12)
NEUTROPHILS NFR BLD AUTO: 7.2 10*3/MM3 (ref 1.7–7)
NEUTROPHILS NFR BLD AUTO: 77.9 % (ref 42.7–76)
PLATELET # BLD AUTO: 243 10*3/MM3 (ref 140–450)
PMV BLD AUTO: 5.8 FL (ref 6–12)
POTASSIUM SERPL-SCNC: 3.9 MMOL/L (ref 3.5–5.2)
PROT SERPL-MCNC: 7.7 G/DL (ref 6–8.5)
RBC # BLD AUTO: 4.27 10*6/MM3 (ref 3.77–5.28)
SODIUM SERPL-SCNC: 136 MMOL/L (ref 136–145)
WBC NRBC COR # BLD: 9.2 10*3/MM3 (ref 3.4–10.8)

## 2022-05-02 PROCEDURE — 99214 OFFICE O/P EST MOD 30 MIN: CPT | Performed by: NURSE PRACTITIONER

## 2022-05-02 PROCEDURE — 36415 COLL VENOUS BLD VENIPUNCTURE: CPT

## 2022-05-02 PROCEDURE — 86304 IMMUNOASSAY TUMOR CA 125: CPT | Performed by: INTERNAL MEDICINE

## 2022-05-02 PROCEDURE — 80053 COMPREHEN METABOLIC PANEL: CPT | Performed by: INTERNAL MEDICINE

## 2022-05-02 PROCEDURE — 85025 COMPLETE CBC W/AUTO DIFF WBC: CPT | Performed by: INTERNAL MEDICINE

## 2022-05-02 RX ORDER — LIDOCAINE AND PRILOCAINE 25; 25 MG/G; MG/G
1 CREAM TOPICAL AS NEEDED
Qty: 5 G | Refills: 5 | Status: SHIPPED | OUTPATIENT
Start: 2022-05-02

## 2022-05-02 NOTE — PLAN OF CARE
Outpatient Infusion • [address] • [address] • [place] [id number] • [phone]      CHEMOTHERAPY EDUCATION    NAME:  Kenisha Jama      : 1945           DATE: 22    Medication Education Sheets: (select all that apply)  Carboplatin and Paclitaxel    Other Education Sheets: (select all that apply)  CINV, Diarrhea and Symptom Tracker Sheet and CAMERON Information    Chemotherapy Regimen:   OP GYN PACLitaxel / CARBOplatin AUC=2 (Weekly)  weekly  On days 1, 8, 15 of each 21 day cycle.    TOPICS EDUCATION PROVIDED COMMENTS   ANEMIA:  role of RBC, cause, s/s, ways to manage, role of transfusion [x] Reviewed the role of RBC and the use of transfusions if hemoglobin decreases too much.  Patient to notify us if they experience shortness of breath, dizziness, or palpitations.  Also let patient know they could feel more tired than usual and to try to stay active, but rest if they need to.    THROMBOCYTOPENIA:  role of platelet, cause, s/s, ways to prevent bleeding, things to avoid, when to seek help [x] Reviewed the role of platelets in blood clotting and when to call clinic (bloody nose that bleeds for 5 mins despite pressure, a cut that won't stop bleeding despite pressure, gums that bleed excessively with brushing or flossing). Recommended using an electric razor, soft bristle toothbrush, and blowing your nose gently.    NEUTROPENIA:  role of WBC, cause, infection precautions, s/s of infection, when to call MD [x] Reviewed the role of WBC, good infection prevention practices, and when to call the clinic (temperature 100.4F, sore throat, burning urination, etc)   NUTRITION & APPETITE CHANGES:  importance of maintaining healthy diet & weight, ways to manage to improve intake, dietary consult, exercise regimen [x] Discussed risk of decreased appetite, reviewed plan for small more frequent meals. Informed patient of potential metallic taste and smell. Recommended flavoring water and using plastic  utensils to help with this adverse effect.   DIARRHEA:  causes, s/s of dehydration, ways to manage, dietary changes, when to call MD [x] Chemotherapy - Discussed risk of diarrhea. Instructed patient that they can use OTC loperamide at first presentation of diarrhea, but call MD if 4-6 episodes in 24 hours not relieved by OTC loperamide.   NAUSEA & VOMITING:  cause, use of antiemetics, dietary changes, when to call MD [x] • Emetic risk: Moderate  • Premeds: Dexamethasone and Palonosetron  • At home meds: Dexamethasone  • Pharmacy PRN meds sent to: [facility] in [place], [place] - she has already picked this up    Instructed the patient to take a dose of the PRN medication at the first onset of nausea and if it's not working to call us for additional medications.  Also provided non-drug measures to mitigate nausea.   MOUTH SORES:  causes, oral care, ways to manage [x] Mouth sores can be prevented by making a mouth wash mixture of salt, baking soda, and water. The patient was instructed to swish and spit four times daily after meals and before bedtime.  Use of a soft bristle toothbrush was recommended.  The patient was instructed to avoid alcohol-containing OTC mouthwashes.    ALOPECIA:  cause, ways to manage, resources [x] Discussed the possibility of hair loss with the patient. Informed patient that they could request a prescription for a wig if desired and most of the cost is usually covered by insurance.    INFERTILITY & SEXUALITY:    causes, fertility preservation options, sexuality changes, ways to manage, importance of birth control [x] Discussed safe sex practices.   NERVOUS SYSTEM CHANGES:  causes, s/s, neuropathies, cognitive changes, ways to manage [x] Discussed the adverse effect of peripheral neuropathy and signs/symptoms associated with this adverse effect. Instructed patient to call if symptoms become more frequent or worsen.    PAIN:  causes, ways to manage [x] Chemo - Discussed muscle and joint aches/pains  with chemotherapy, and recommended the use of OTC pain relief with ibuprofen or acetaminophen if needed.   SKIN & NAIL CHANGES:  cause, s/s, ways to manage [x] Chemotherapy - Informed patient that they may see dark or white lines on finger and toenails during treatment, and that their nails may become brittle and even fall off in extreme cases. But that this would likely reverse after treatment concludes.    HOME CARE:  use of spill kits, storing of PO chemo, how to manage bodily fluids [x] IV - Counseled on management of soiled linens and proper flush technique.  Discussed how to manage all the side effects at home and advised when to contact the MD office   INFUSION RELATED REACTIONS:  Cause, s/s, anaphylaxis, vesicant, etc. [x] Premeds: Diphenhydramine and Famotidine    Discussed risks of infusion related reactions, s/s, management plan   MISCELLANEOUS:  drug interactions, administration, labs, etc. [x] Discussed chemotherapy schedule, lab draws, infusion times, and total expected visit time.   • DDIs: No significant DDIs  • Lab draws: Days 1, 8, 15 of each cycle    Sent [facility] to [facility] in [place], [place] per patient's request.  Explained how to use it during education.     Medications:  Prior to Admission medications    Medication Sig Start Date End Date Taking? Authorizing Provider   acetaminophen (Tylenol 8 Hour) 650 MG 8 hr tablet Take 1 tablet by mouth Every 8 (Eight) Hours As Needed for Mild Pain . 3/3/22   Elizabeth Pedroza MD   carvedilol (COREG) 12.5 MG tablet Take 12.5 mg by mouth 2 (Two) Times a Day With Meals.    ProviderBull MD   ezetimibe (ZETIA) 10 MG tablet Take 10 mg by mouth Every Night.    ProviderBull MD   ferrous sulfate 325 (65 FE) MG tablet Take 325 mg by mouth Daily With Breakfast.    ProviderBull MD   ibuprofen (ADVIL,MOTRIN) 600 MG tablet Take 1 tablet by mouth Every 6 (Six) Hours As Needed for Mild Pain . 3/3/22   Elizabeth Pedroza MD   letrozole (Femara) 2.5 MG  tablet Take 1 tablet by mouth Daily for 90 days. 1/27/22 4/27/22  Ruby Trevino MD   lidocaine-prilocaine (EMLA) 2.5-2.5 % cream Apply 1 application topically to the appropriate area as directed As Needed for Injection Site Pain. Apply 45-60 minutes before port access, cover with plastic wrap after application. 5/2/22   Ruby Trevino MD   lisinopril (PRINIVIL,ZESTRIL) 40 MG tablet Take 40 mg by mouth Daily.    Bull Alvarez MD   NIFEdipine XL (PROCARDIA XL) 30 MG 24 hr tablet Take 30 mg by mouth Daily.    Bull Alvarez MD   ondansetron (ZOFRAN) 8 MG tablet Take 1 tablet by mouth 3 (Three) Times a Day As Needed for Nausea or Vomiting. 4/20/22   Ruby Trevino MD   pantoprazole (PROTONIX) 40 MG EC tablet  10/17/16   Bull Alvarez MD   pravastatin (PRAVACHOL) 80 MG tablet  11/2/16   Bull Alvarez MD   ursodiol (ACTIGALL) 500 MG tablet  8/17/16   Bull Alvarez MD         Notes: All questions and concerns were addressed. Provided a personalized treatment calendar to patient (includes treatment and lab schedule). Provided patient with contact information for the pharmacist and clinic while instructing them to call if any questions or concerns arise. Informed consent for treatment was obtained. Patient was receptive to information and expressed understanding.       Gardenia Rosenberg, PharmD, BCOP - Oncology Clinical Pharmacist [phone]  5/2/22  10:44 EDT

## 2022-05-02 NOTE — PROGRESS NOTES
CHEMOTHERAPY PREPARATION    Kenisha Jama  [id number]  1945    Chief Complaint: Treatment preparation and needs assessment    History of present illness:  Kenisha Jama is a 76 y.o. year old female who is here today for treatment preparation and needs assessment.  The patient has been diagnosed with triple negative breast cancer on right and ER positive breast cancer on left status post bilateral lumpectomy.  She also has a history endometrial cancer and just completed CyberKnife to a lung met.  She is scheduled to begin treatment with PACLitaxel / CARBOplatin.     Oncology History:    Oncology/Hematology History   Endometrial cancer (HCC)   9/30/2011 Imaging    TVUS and CT scan for palpable left pelvic mass upon annual exam and new abdominal symptoms.      10/6/2011 Surgery    ADY/BSO with pelvic lymph node dissection. Stage 1A grade 2 endometrial adenocarcinoma by final pathology     1/26/2022 Imaging    IMPRESSION:     Hypermetabolic soft tissue nodule in the left breast compatible with  biopsy-proven invasive ductal carcinoma. There is diffuse low level FDG  uptake in the region of recent right breast biopsy site. A  hypermetabolic mass in the left lower lobe is suspicious for pulmonary  metastasis. No additional sites of metastases are identified. There is  no evidence of discrete/focal hypermetabolic lesion of the sternum to  correspond with the indeterminant sternal lesion described on breast MRI  exam.     2/14/2022 Biopsy    Final Diagnosis   LEFT LUNG, BIOPSY:  Metastatic adenocarcinoma.  See comment.  GJK   Electronically signed by Wolfgang Hair MD on 2/22/2022 at 1154   Comment    Sections show small fragments of tumor that are morphologically distinct from the patient's known breast cancers.  Immunoprofile suggests mullerian origin.  Clinical correlation required.  This case was shown for intradepartmental consultation and the other pathologist concurs with the findings  interpretation.  This case was discussed with Dr. Trevino on 02/21/22.  This case was discussed with Dr. Reed on 02/22/22.        5/4/2022 -  Chemotherapy    OP GYN PACLitaxel / CARBOplatin AUC=2 (Weekly)     Malignant neoplasm of upper-outer quadrant of both breasts in female, estrogen receptor positive (HCC)   1/21/2022 Initial Diagnosis    Malignant neoplasm of upper-outer quadrant of both breasts in female, estrogen receptor positive (HCC)     1/24/2022 Biopsy    1.  Right breast cancer-invasive ductal carcinoma grade 2-ER negative OH negative HER-2 negative    2.  Left breast cancer-invasive ductal carcinoma grade 2-ER positive OH negative HER-2 negative     1/26/2022 Imaging    EXAMINATION: NM PET/CT SKULL BASE TO MID THIGH      FINDINGS:      Today's 3-D images demonstrate focal hypermetabolism in the soft tissues  of the left breast as well as focal hypermetabolism within the left  midlung.     Multiplanar images of the head and neck demonstrate symmetric FDG uptake  within the brain. There is no evidence of hypermetabolic neck mass or  lymph node.     In the upper outer quadrant of the left breast there is redemonstration  of a irregular 2.1 cm soft tissue nodule with FDG hypermetabolism of SUV  max 4.2, compatible with biopsy-proven invasive ductal carcinoma. There  is an ill-defined diffuse region of low-level hypermetabolism in the  right breast with adjacent biopsy clip and single locule of soft tissue  air, compatible with recent right breast biopsy. A discrete  hypermetabolic nodule or mass in the right breast is not confidently  identified. There is no hypermetabolic or enlarged axillary lymph nodes.     Within the chest there is redemonstration of a lobulated soft tissue  mass in the superior aspect of the left lower lobe which appears  hypermetabolic with SUV max 6.5. There is no evidence of hypermetabolic  mediastinal or hilar adenopathy.     Below the diaphragm there is expected physiologic  uptake within the   and GI tracts. No evidence of abdominal pelvic malignancy or metastasis.  No hypermetabolic abdominopelvic lymph nodes. Photopenic left renal cyst  is noted.     There is no hypermetabolic pathologic marrow process. Specifically,  there is no evidence of hypermetabolic lesion of the sternum to  correspond with the indeterminant sternal lesion detailed on prior  breast MRI exam. FDG uptake at the left antecubital fossa reflects site  of IV administration.     IMPRESSION:     Hypermetabolic soft tissue nodule in the left breast compatible with  biopsy-proven invasive ductal carcinoma. There is diffuse low level FDG  uptake in the region of recent right breast biopsy site. A  hypermetabolic mass in the left lower lobe is suspicious for pulmonary  metastasis. No additional sites of metastases are identified. There is  no evidence of discrete/focal hypermetabolic lesion of the sternum to  correspond with the indeterminant sternal lesion described on breast MRI  exam.           4/20/2022 Cancer Staged    Staging form: Breast, AJCC 8th Edition  - Pathologic: Stage IB (pT1c, pN0, cM0, G3, ER-, IL-, HER2-) - Signed by Ruby Trevino MD on 4/20/2022         The current medication list and allergy list were reviewed and reconciled.     Past Medical History, Past Surgical History, Social History, Family History have been reviewed and are without significant changes except as mentioned.    Review of Systems:    Review of Systems   Constitutional: Negative for appetite change, fatigue, fever and unexpected weight change.   HENT: Negative for mouth sores, sore throat and trouble swallowing.    Respiratory: Negative for cough, shortness of breath and wheezing.    Cardiovascular: Negative for chest pain, palpitations and leg swelling.   Gastrointestinal: Negative for abdominal distention, abdominal pain, constipation, diarrhea, nausea and vomiting.   Genitourinary: Negative for difficulty urinating,  dysuria and frequency.   Musculoskeletal: Negative for arthralgias.   Skin: Negative for pallor, rash and wound.   Neurological: Negative for dizziness and weakness.   Hematological: Does not bruise/bleed easily.   Psychiatric/Behavioral: Negative for confusion and sleep disturbance. The patient is not nervous/anxious.        Physical Exam:    Vitals:    05/02/22 1114   BP: 139/67   Pulse: 68   Resp: 18   Temp: 98.1 °F (36.7 °C)   SpO2: 98%     Vitals:    05/02/22 1114   PainSc: 0-No pain          ECOG: (1) Restricted in Physically Strenuous Activity, Ambulatory & Able to Do Work of Light Nature    General: well appearing, in no acute distress  Cardiovascular: regular rate and rhythm, no murmurs noted  Lungs: clear to auscultation bilaterally, respirations even and unlabored  Extremities: no lower extremity edema  Skin: no rashes, lesions, bruising, or petechiae  Psych: Mood is stable            NEEDS ASSESSMENTS    Genetics  The patient's new diagnosis and family history have been reviewed for genetic counseling needs. A genetic referral is not recommended.     Psychosocial  The patient has completed a PHQ-9 Depression Screening and the Distress Thermometer (DT) today.   PHQ-9 results show 1-4 (Minimal Depression). The patient scored their distress today as 0 on a scale of 0-10 with 0 being no distress and 10 being extreme distress.   Problems marked by the patient as being an issue for them within the last week include no problems.   Results were reviewed along with psychosocial resources offered by our cancer center.  Our oncology social worker will be flagged for a DT score of 4 or above, and a same day call will be made for a score of 9 or 10.  A mental health referral is offered at this time. The patient is not interested in a referral to [facility].   Copies of patient's questionnaires will be scanned into EMR for details and further reference.    Barriers to care  A barriers form was also completed by  "the patient today. We discussed services offered by our facility to help her have adequate access to care. The patient was given the name and contact information for our Oncology Social Worker, Nupur Almendarez.  Based upon barriers assessment today, the patient will not require a follow-up call from the  to further discuss needs.   A copy of the barriers form will also be scanned into EMR for details and further reference.     VAD Assessment  The patient and I discussed planned intervenous chemotherapy as well as other IV treatments that are often needed throughout the course of treatment. These may include, but are not limited to blood transfusions, antibiotics, and IV hydration. The vasculature does appear to be adequate for multiple peripheral IVs throughout their treatment course. Patient has Port-A-Cath in place.     Advanced Care Planning  The patient and I discussed advanced care planning, \"Conversations that Matter\".   This service was offered, free of charge, for development of advance directives with a certified ACP facilitator.  The patient does have an up-to-date advanced directive. This document is on file with our office. The patient is not interested in an appointment with one of our facilitators to create or update their advanced directives.      Palliative Care  The patient and I discussed palliative care services. Palliative care is not the same as Hospice care. This is specialized medical care for people living with serious illness with the goal of improving quality of life for the patient and their family. [facility] has partnered with [facility] to offer our patients outpatient palliative care early along with their treatment to assist in coordination of care, symptom management, pain management, and medical decision making.  Oncology criteria for palliative care referral is not met at this time. The patient is not interested in a palliative care consultation. "     Additional Referral needs  none      CHEMOTHERAPY EDUCATION    Chemotherapy education completed and consent obtained per oncology pharmacist.  See their documentation for further details.    Booklets Given: Chemotherapy and You [x]  Nutrition for the Patient with Cancer During Treatment [x]    Sexuality/Fertility Books []     Chemotherapy Regimen:   Treatment Plans     Name Type Plan Dates Plan Provider         Active    OP GYN PACLitaxel / CARBOplatin AUC=2 (Weekly) ONCOLOGY TREATMENT  4/21/2022 - Present Ruby Trevino MD                    Chemotherapy education comprehension reviewed. Questions answered.      Assessment and Plan:    Diagnoses and all orders for this visit:    1. Malignant neoplasm of upper-outer quadrant of both breasts in female, estrogen receptor positive (HCC) (Primary)    2. Endometrial cancer (HCC)      The patient and I have reviewed their cancer diagnosis and scheduled treatment plan. Needs assessment was completed including genetics, psychosocial needs, barriers to care, VAD evaluation, advanced care planning, and palliative care services. Referrals have been ordered as appropriate based upon our evaluation and patient desires.     Chemotherapy teaching was also completed today per pharmacy.  Adequate time was given to answer questions.  Patient and family are aware of their care team members and contact information if they have questions or problems throughout the treatment course. The patient is adequately prepared to begin treatment as scheduled on 5/4/2022.      Patient had pretreatment labs drawn today.    I spent 30 minutes caring for Kenisha on this date of service. This time includes time spent by me in the following activities: preparing for the visit, reviewing tests, performing a medically appropriate examination and/or evaluation, counseling and educating the patient/family/caregiver, documenting information in the medical record and care coordination.     Johanny  Chet, ANTONIO  05/02/22

## 2022-05-03 ENCOUNTER — TELEPHONE (OUTPATIENT)
Dept: ONCOLOGY | Facility: CLINIC | Age: 77
End: 2022-05-03

## 2022-05-04 ENCOUNTER — HOSPITAL ENCOUNTER (OUTPATIENT)
Dept: ONCOLOGY | Facility: HOSPITAL | Age: 77
Setting detail: INFUSION SERIES
Discharge: HOME | End: 2022-05-04

## 2022-05-04 ENCOUNTER — APPOINTMENT (OUTPATIENT)
Dept: ONCOLOGY | Facility: HOSPITAL | Age: 77
End: 2022-05-04

## 2022-05-04 ENCOUNTER — DOCUMENTATION (OUTPATIENT)
Dept: NUTRITION | Facility: HOSPITAL | Age: 77
End: 2022-05-04

## 2022-05-04 VITALS
HEART RATE: 63 BPM | RESPIRATION RATE: 18 BRPM | DIASTOLIC BLOOD PRESSURE: 49 MMHG | BODY MASS INDEX: 28.35 KG/M2 | TEMPERATURE: 97.7 F | SYSTOLIC BLOOD PRESSURE: 142 MMHG | HEIGHT: 63 IN | WEIGHT: 160 LBS

## 2022-05-04 DIAGNOSIS — R91.8 MASS OF LEFT LUNG: ICD-10-CM

## 2022-05-04 DIAGNOSIS — Z17.0 MALIGNANT NEOPLASM OF UPPER-OUTER QUADRANT OF BOTH BREASTS IN FEMALE, ESTROGEN RECEPTOR POSITIVE: ICD-10-CM

## 2022-05-04 DIAGNOSIS — C54.1 ENDOMETRIAL CANCER: Primary | ICD-10-CM

## 2022-05-04 DIAGNOSIS — C50.412 MALIGNANT NEOPLASM OF UPPER-OUTER QUADRANT OF BOTH BREASTS IN FEMALE, ESTROGEN RECEPTOR POSITIVE: ICD-10-CM

## 2022-05-04 DIAGNOSIS — C50.411 MALIGNANT NEOPLASM OF UPPER-OUTER QUADRANT OF BOTH BREASTS IN FEMALE, ESTROGEN RECEPTOR POSITIVE: ICD-10-CM

## 2022-05-04 PROCEDURE — 96375 TX/PRO/DX INJ NEW DRUG ADDON: CPT

## 2022-05-04 PROCEDURE — 96417 CHEMO IV INFUS EACH ADDL SEQ: CPT

## 2022-05-04 PROCEDURE — 96413 CHEMO IV INFUSION 1 HR: CPT

## 2022-05-04 PROCEDURE — 25010000002 DIPHENHYDRAMINE PER 50 MG: Performed by: INTERNAL MEDICINE

## 2022-05-04 PROCEDURE — 25010000002 PACLITAXEL PER 1 MG: Performed by: INTERNAL MEDICINE

## 2022-05-04 PROCEDURE — 25010000002 CARBOPLATIN PER 50 MG: Performed by: INTERNAL MEDICINE

## 2022-05-04 PROCEDURE — 25010000002 HEPARIN LOCK FLUSH PER 10 UNITS: Performed by: INTERNAL MEDICINE

## 2022-05-04 PROCEDURE — 25010000002 DEXAMETHASONE SODIUM PHOSPHATE 100 MG/10ML SOLUTION: Performed by: INTERNAL MEDICINE

## 2022-05-04 PROCEDURE — 25010000002 PALONOSETRON 0.25 MG/5ML SOLUTION PREFILLED SYRINGE: Performed by: INTERNAL MEDICINE

## 2022-05-04 RX ORDER — SODIUM CHLORIDE 9 MG/ML
250 INJECTION, SOLUTION INTRAVENOUS ONCE
Status: CANCELLED | OUTPATIENT
Start: 2022-05-25

## 2022-05-04 RX ORDER — FAMOTIDINE 10 MG/ML
20 INJECTION, SOLUTION INTRAVENOUS ONCE
Status: CANCELLED | OUTPATIENT
Start: 2022-05-18

## 2022-05-04 RX ORDER — SODIUM CHLORIDE 0.9 % (FLUSH) 0.9 %
10 SYRINGE (ML) INJECTION AS NEEDED
Status: CANCELLED | OUTPATIENT
Start: 2022-05-04

## 2022-05-04 RX ORDER — FAMOTIDINE 10 MG/ML
20 INJECTION, SOLUTION INTRAVENOUS ONCE
Status: CANCELLED | OUTPATIENT
Start: 2022-05-25

## 2022-05-04 RX ORDER — FAMOTIDINE 10 MG/ML
20 INJECTION, SOLUTION INTRAVENOUS AS NEEDED
Status: CANCELLED | OUTPATIENT
Start: 2022-05-04

## 2022-05-04 RX ORDER — PALONOSETRON 0.05 MG/ML
0.25 INJECTION, SOLUTION INTRAVENOUS ONCE
Status: CANCELLED | OUTPATIENT
Start: 2022-06-08

## 2022-05-04 RX ORDER — DIPHENHYDRAMINE HYDROCHLORIDE 50 MG/ML
50 INJECTION INTRAMUSCULAR; INTRAVENOUS AS NEEDED
Status: CANCELLED | OUTPATIENT
Start: 2022-05-18

## 2022-05-04 RX ORDER — FAMOTIDINE 10 MG/ML
20 INJECTION, SOLUTION INTRAVENOUS AS NEEDED
Status: CANCELLED | OUTPATIENT
Start: 2022-06-01

## 2022-05-04 RX ORDER — HEPARIN SODIUM (PORCINE) LOCK FLUSH IV SOLN 100 UNIT/ML 100 UNIT/ML
500 SOLUTION INTRAVENOUS AS NEEDED
Status: CANCELLED | OUTPATIENT
Start: 2022-05-04

## 2022-05-04 RX ORDER — FAMOTIDINE 10 MG/ML
20 INJECTION, SOLUTION INTRAVENOUS ONCE
Status: CANCELLED | OUTPATIENT
Start: 2022-05-11

## 2022-05-04 RX ORDER — SODIUM CHLORIDE 9 MG/ML
250 INJECTION, SOLUTION INTRAVENOUS ONCE
Status: COMPLETED | OUTPATIENT
Start: 2022-05-04 | End: 2022-05-04

## 2022-05-04 RX ORDER — SODIUM CHLORIDE 9 MG/ML
250 INJECTION, SOLUTION INTRAVENOUS ONCE
Status: CANCELLED | OUTPATIENT
Start: 2022-06-08

## 2022-05-04 RX ORDER — SODIUM CHLORIDE 9 MG/ML
250 INJECTION, SOLUTION INTRAVENOUS ONCE
Status: CANCELLED | OUTPATIENT
Start: 2022-05-04

## 2022-05-04 RX ORDER — SODIUM CHLORIDE 9 MG/ML
250 INJECTION, SOLUTION INTRAVENOUS ONCE
Status: CANCELLED | OUTPATIENT
Start: 2022-05-11

## 2022-05-04 RX ORDER — FAMOTIDINE 10 MG/ML
20 INJECTION, SOLUTION INTRAVENOUS ONCE
Status: COMPLETED | OUTPATIENT
Start: 2022-05-04 | End: 2022-05-04

## 2022-05-04 RX ORDER — PALONOSETRON 0.05 MG/ML
0.25 INJECTION, SOLUTION INTRAVENOUS ONCE
Status: CANCELLED | OUTPATIENT
Start: 2022-05-25

## 2022-05-04 RX ORDER — HEPARIN SODIUM (PORCINE) LOCK FLUSH IV SOLN 100 UNIT/ML 100 UNIT/ML
500 SOLUTION INTRAVENOUS AS NEEDED
Status: DISCONTINUED | OUTPATIENT
Start: 2022-05-04 | End: 2022-05-05 | Stop reason: HOSPADM

## 2022-05-04 RX ORDER — DIPHENHYDRAMINE HYDROCHLORIDE 50 MG/ML
50 INJECTION INTRAMUSCULAR; INTRAVENOUS AS NEEDED
Status: CANCELLED | OUTPATIENT
Start: 2022-05-11

## 2022-05-04 RX ORDER — DIPHENHYDRAMINE HYDROCHLORIDE 50 MG/ML
50 INJECTION INTRAMUSCULAR; INTRAVENOUS AS NEEDED
Status: CANCELLED | OUTPATIENT
Start: 2022-06-01

## 2022-05-04 RX ORDER — SODIUM CHLORIDE 9 MG/ML
250 INJECTION, SOLUTION INTRAVENOUS ONCE
Status: CANCELLED | OUTPATIENT
Start: 2022-05-18

## 2022-05-04 RX ORDER — FAMOTIDINE 10 MG/ML
20 INJECTION, SOLUTION INTRAVENOUS AS NEEDED
Status: CANCELLED | OUTPATIENT
Start: 2022-06-08

## 2022-05-04 RX ORDER — FAMOTIDINE 10 MG/ML
20 INJECTION, SOLUTION INTRAVENOUS ONCE
Status: CANCELLED | OUTPATIENT
Start: 2022-05-04

## 2022-05-04 RX ORDER — PALONOSETRON 0.05 MG/ML
0.25 INJECTION, SOLUTION INTRAVENOUS ONCE
Status: CANCELLED | OUTPATIENT
Start: 2022-06-01

## 2022-05-04 RX ORDER — FAMOTIDINE 10 MG/ML
20 INJECTION, SOLUTION INTRAVENOUS AS NEEDED
Status: CANCELLED | OUTPATIENT
Start: 2022-05-25

## 2022-05-04 RX ORDER — FAMOTIDINE 10 MG/ML
20 INJECTION, SOLUTION INTRAVENOUS ONCE
Status: CANCELLED | OUTPATIENT
Start: 2022-06-08

## 2022-05-04 RX ORDER — DIPHENHYDRAMINE HYDROCHLORIDE 50 MG/ML
50 INJECTION INTRAMUSCULAR; INTRAVENOUS AS NEEDED
Status: CANCELLED | OUTPATIENT
Start: 2022-05-04

## 2022-05-04 RX ORDER — PALONOSETRON 0.05 MG/ML
0.25 INJECTION, SOLUTION INTRAVENOUS ONCE
Status: CANCELLED | OUTPATIENT
Start: 2022-05-04

## 2022-05-04 RX ORDER — FAMOTIDINE 10 MG/ML
20 INJECTION, SOLUTION INTRAVENOUS AS NEEDED
Status: CANCELLED | OUTPATIENT
Start: 2022-05-11

## 2022-05-04 RX ORDER — DIPHENHYDRAMINE HYDROCHLORIDE 50 MG/ML
50 INJECTION INTRAMUSCULAR; INTRAVENOUS AS NEEDED
Status: CANCELLED | OUTPATIENT
Start: 2022-06-08

## 2022-05-04 RX ORDER — SODIUM CHLORIDE 9 MG/ML
250 INJECTION, SOLUTION INTRAVENOUS ONCE
Status: CANCELLED | OUTPATIENT
Start: 2022-06-01

## 2022-05-04 RX ORDER — PALONOSETRON 0.05 MG/ML
0.25 INJECTION, SOLUTION INTRAVENOUS ONCE
Status: CANCELLED | OUTPATIENT
Start: 2022-05-11

## 2022-05-04 RX ORDER — PALONOSETRON 0.05 MG/ML
0.25 INJECTION, SOLUTION INTRAVENOUS ONCE
Status: CANCELLED | OUTPATIENT
Start: 2022-05-18

## 2022-05-04 RX ORDER — FAMOTIDINE 10 MG/ML
20 INJECTION, SOLUTION INTRAVENOUS AS NEEDED
Status: CANCELLED | OUTPATIENT
Start: 2022-05-18

## 2022-05-04 RX ORDER — PALONOSETRON 0.05 MG/ML
0.25 INJECTION, SOLUTION INTRAVENOUS ONCE
Status: COMPLETED | OUTPATIENT
Start: 2022-05-04 | End: 2022-05-04

## 2022-05-04 RX ORDER — FAMOTIDINE 10 MG/ML
20 INJECTION, SOLUTION INTRAVENOUS ONCE
Status: CANCELLED | OUTPATIENT
Start: 2022-06-01

## 2022-05-04 RX ORDER — DIPHENHYDRAMINE HYDROCHLORIDE 50 MG/ML
50 INJECTION INTRAMUSCULAR; INTRAVENOUS AS NEEDED
Status: CANCELLED | OUTPATIENT
Start: 2022-05-25

## 2022-05-04 RX ADMIN — HEPARIN 500 UNITS: 100 SYRINGE at 11:39

## 2022-05-04 RX ADMIN — SODIUM CHLORIDE 250 ML: 9 INJECTION, SOLUTION INTRAVENOUS at 08:55

## 2022-05-04 RX ADMIN — DIPHENHYDRAMINE HYDROCHLORIDE 50 MG: 50 INJECTION, SOLUTION INTRAMUSCULAR; INTRAVENOUS at 09:02

## 2022-05-04 RX ADMIN — FAMOTIDINE 20 MG: 10 INJECTION INTRAVENOUS at 09:00

## 2022-05-04 RX ADMIN — PACLITAXEL 105 MG: 6 INJECTION, SOLUTION INTRAVENOUS at 09:58

## 2022-05-04 RX ADMIN — DEXAMETHASONE SODIUM PHOSPHATE 12 MG: 10 INJECTION, SOLUTION INTRAMUSCULAR; INTRAVENOUS at 09:02

## 2022-05-04 RX ADMIN — PALONOSETRON 0.25 MG: 0.25 INJECTION, SOLUTION INTRAVENOUS at 08:59

## 2022-05-04 RX ADMIN — CARBOPLATIN 200 MG: 10 INJECTION, SOLUTION INTRAVENOUS at 11:00

## 2022-05-04 NOTE — PROGRESS NOTES
"Outpatient Oncology Nutrition     Reason for Visit:    Oncology Nutrition Screening and Patient Education / Met with patient during her initial chemotherapy infusion appointment.     Patient Name:  Kenisha Jama    :  1945    MRN:  [id number]    Date of Encounter: 2022    Nutrition Assessment     Diagnosis: ER positive breast cancer on left  Surgery: left breast lumpectomy     Diagnosis:  triple negative breast cancer on right   Surgery: right breast lumpectomy    Diagnosis: recurrent endometrial cancer with lung met  Surgery: ADY/BSO with pelvic lymph node dissection (10/6/2011)  Surgery: diagnostic laparoscopy (3/3/2022)  Radiation: 25 Gray in 1 fraction to lung met (completed 22)  Chemotherapy: Carbo / Taxol - days 1,8,15 - every 21 days     Patient Active Problem List:    Patient Active Problem List   Diagnosis   • Endometrial cancer (HCC)   • Osteoporosis   • Malignant neoplasm of upper-outer quadrant of both breasts in female, estrogen receptor positive (HCC)   • Mass of left lung       Food / Nutrition Related History       Hydration Status   Discussed the importance of hydration and encouraged her to increase her intake of water.    Goal: 64-80 ounces     How many 8 ounces glasses of water do you consume per day?  Patient reports drinking mostly water.    Enteral Feeding       Anthropometric Measurements     Height:    Ht Readings from Last 1 Encounters:   22 160 cm (63\")       Weight:    Wt Readings from Last 1 Encounters:   22 72.6 kg (160 lb)       BMI:  28.4 - Overweight     Weight Change: weight has been stable     Review of Lab Data (Time Frame - 1 month / 2 month)   Labs reviewed - 22    Medication Review   MAR reviewed     Nutrition Focused Physical Findings       Nutrition Impact Symptoms   No problems with eating    Physical Activity   Not my normal self, but able to be up and about with fairly normal activities    Current Nutritional Intake     Oral diet:  " "Regular     Intake: oral intake has been normal     Malnutrition Risk Assessment     Recent weight loss over the past 6 months:  0 = No    Eating poorly because of a decreased appetite:  0 = No    Malnutrition Screening Score:     MST = 0 or 1 Patient not at risk for malnutrition    Nutrition Diagnosis     Problem    Etiology    Signs / Symptoms      Nutrition Intervention   Discussed the importance of good nutrition during her treatment course focusing on adequate calorie, protein, nutrient and fluid intake.  Advised her to be consuming smaller more frequent meals/snacks throughout the day to aid with potential nausea management.  Emphasized the importance of protein and its role in the diet; reviewed high protein foods; and recommended she have a protein source at each meal/snack.  Provided and reviewed written diet material \"Nutritional Considerations in Breast Cancer\" and discussed the basics of survivorship nutrition.     Goal   To achieve adequate nutritional and hydration intake during treatment.  To aid with nutrition impact symptom management as needed.     Monitoring / Evaluation   Answered her questions and she voiced understanding of information discussed.  RD's contact information provided and encouraged to call with questions.  Will follow up as indicated.     Lilly Corcoran MS, RD, LD   "

## 2022-05-11 ENCOUNTER — HOSPITAL ENCOUNTER (OUTPATIENT)
Dept: ONCOLOGY | Facility: HOSPITAL | Age: 77
Setting detail: INFUSION SERIES
Discharge: HOME | End: 2022-05-11

## 2022-05-11 ENCOUNTER — APPOINTMENT (OUTPATIENT)
Dept: ONCOLOGY | Facility: HOSPITAL | Age: 77
End: 2022-05-11

## 2022-05-11 VITALS
SYSTOLIC BLOOD PRESSURE: 148 MMHG | WEIGHT: 159 LBS | RESPIRATION RATE: 16 BRPM | HEART RATE: 63 BPM | DIASTOLIC BLOOD PRESSURE: 71 MMHG | TEMPERATURE: 96.7 F | HEIGHT: 63 IN | BODY MASS INDEX: 28.17 KG/M2

## 2022-05-11 DIAGNOSIS — R91.8 MASS OF LEFT LUNG: ICD-10-CM

## 2022-05-11 DIAGNOSIS — C50.412 MALIGNANT NEOPLASM OF UPPER-OUTER QUADRANT OF BOTH BREASTS IN FEMALE, ESTROGEN RECEPTOR POSITIVE: ICD-10-CM

## 2022-05-11 DIAGNOSIS — C50.411 MALIGNANT NEOPLASM OF UPPER-OUTER QUADRANT OF BOTH BREASTS IN FEMALE, ESTROGEN RECEPTOR POSITIVE: ICD-10-CM

## 2022-05-11 DIAGNOSIS — C54.1 ENDOMETRIAL CANCER: Primary | ICD-10-CM

## 2022-05-11 DIAGNOSIS — Z17.0 MALIGNANT NEOPLASM OF UPPER-OUTER QUADRANT OF BOTH BREASTS IN FEMALE, ESTROGEN RECEPTOR POSITIVE: ICD-10-CM

## 2022-05-11 LAB
ALBUMIN SERPL-MCNC: 3.8 G/DL (ref 3.5–5.2)
ALBUMIN/GLOB SERPL: 1.2 G/DL
ALP SERPL-CCNC: 67 U/L (ref 39–117)
ALT SERPL W P-5'-P-CCNC: 15 U/L (ref 1–33)
ANION GAP SERPL CALCULATED.3IONS-SCNC: 6 MMOL/L (ref 5–15)
AST SERPL-CCNC: 17 U/L (ref 1–32)
BILIRUB SERPL-MCNC: 0.8 MG/DL (ref 0–1.2)
BUN SERPL-MCNC: 15 MG/DL (ref 8–23)
BUN/CREAT SERPL: 21.1 (ref 7–25)
CALCIUM SPEC-SCNC: 9.2 MG/DL (ref 8.6–10.5)
CHLORIDE SERPL-SCNC: 102 MMOL/L (ref 98–107)
CO2 SERPL-SCNC: 27 MMOL/L (ref 22–29)
CREAT SERPL-MCNC: 0.71 MG/DL (ref 0.57–1)
EGFRCR SERPLBLD CKD-EPI 2021: 88.8 ML/MIN/1.73
ERYTHROCYTE [DISTWIDTH] IN BLOOD BY AUTOMATED COUNT: 12.3 % (ref 12.3–15.4)
GLOBULIN UR ELPH-MCNC: 3.3 GM/DL
GLUCOSE SERPL-MCNC: 123 MG/DL (ref 65–99)
HCT VFR BLD AUTO: 33.8 % (ref 34–46.6)
HGB BLD-MCNC: 10.7 G/DL (ref 12–15.9)
LYMPHOCYTES # BLD AUTO: 0.9 10*3/MM3 (ref 0.7–3.1)
LYMPHOCYTES NFR BLD AUTO: 14.7 % (ref 19.6–45.3)
MCH RBC QN AUTO: 28.7 PG (ref 26.6–33)
MCHC RBC AUTO-ENTMCNC: 31.8 G/DL (ref 31.5–35.7)
MCV RBC AUTO: 90.3 FL (ref 79–97)
MONOCYTES # BLD AUTO: 0.2 10*3/MM3 (ref 0.1–0.9)
MONOCYTES NFR BLD AUTO: 3.2 % (ref 5–12)
NEUTROPHILS NFR BLD AUTO: 5.2 10*3/MM3 (ref 1.7–7)
NEUTROPHILS NFR BLD AUTO: 82.1 % (ref 42.7–76)
PLATELET # BLD AUTO: 139 10*3/MM3 (ref 140–450)
PMV BLD AUTO: 6.7 FL (ref 6–12)
POTASSIUM SERPL-SCNC: 4.1 MMOL/L (ref 3.5–5.2)
PROT SERPL-MCNC: 7.1 G/DL (ref 6–8.5)
RBC # BLD AUTO: 3.75 10*6/MM3 (ref 3.77–5.28)
SODIUM SERPL-SCNC: 135 MMOL/L (ref 136–145)
WBC NRBC COR # BLD: 6.3 10*3/MM3 (ref 3.4–10.8)

## 2022-05-11 PROCEDURE — 25010000002 DEXAMETHASONE SODIUM PHOSPHATE 100 MG/10ML SOLUTION: Performed by: INTERNAL MEDICINE

## 2022-05-11 PROCEDURE — 85025 COMPLETE CBC W/AUTO DIFF WBC: CPT | Performed by: INTERNAL MEDICINE

## 2022-05-11 PROCEDURE — 96413 CHEMO IV INFUSION 1 HR: CPT

## 2022-05-11 PROCEDURE — 80053 COMPREHEN METABOLIC PANEL: CPT | Performed by: INTERNAL MEDICINE

## 2022-05-11 PROCEDURE — 25010000002 DIPHENHYDRAMINE PER 50 MG: Performed by: INTERNAL MEDICINE

## 2022-05-11 PROCEDURE — 96375 TX/PRO/DX INJ NEW DRUG ADDON: CPT

## 2022-05-11 PROCEDURE — 25010000002 PACLITAXEL PER 1 MG: Performed by: INTERNAL MEDICINE

## 2022-05-11 PROCEDURE — 25010000002 PALONOSETRON 0.25 MG/5ML SOLUTION PREFILLED SYRINGE: Performed by: INTERNAL MEDICINE

## 2022-05-11 PROCEDURE — 25010000002 HEPARIN LOCK FLUSH PER 10 UNITS: Performed by: INTERNAL MEDICINE

## 2022-05-11 PROCEDURE — 96417 CHEMO IV INFUS EACH ADDL SEQ: CPT

## 2022-05-11 PROCEDURE — 25010000002 CARBOPLATIN PER 50 MG: Performed by: INTERNAL MEDICINE

## 2022-05-11 RX ORDER — SODIUM CHLORIDE 0.9 % (FLUSH) 0.9 %
10 SYRINGE (ML) INJECTION AS NEEDED
Status: CANCELLED | OUTPATIENT
Start: 2022-05-11

## 2022-05-11 RX ORDER — HEPARIN SODIUM (PORCINE) LOCK FLUSH IV SOLN 100 UNIT/ML 100 UNIT/ML
500 SOLUTION INTRAVENOUS AS NEEDED
Status: CANCELLED | OUTPATIENT
Start: 2022-05-11

## 2022-05-11 RX ORDER — PALONOSETRON 0.05 MG/ML
0.25 INJECTION, SOLUTION INTRAVENOUS ONCE
Status: COMPLETED | OUTPATIENT
Start: 2022-05-11 | End: 2022-05-11

## 2022-05-11 RX ORDER — SODIUM CHLORIDE 9 MG/ML
250 INJECTION, SOLUTION INTRAVENOUS ONCE
Status: COMPLETED | OUTPATIENT
Start: 2022-05-11 | End: 2022-05-11

## 2022-05-11 RX ORDER — HEPARIN SODIUM (PORCINE) LOCK FLUSH IV SOLN 100 UNIT/ML 100 UNIT/ML
500 SOLUTION INTRAVENOUS AS NEEDED
Status: DISCONTINUED | OUTPATIENT
Start: 2022-05-11 | End: 2022-05-12 | Stop reason: HOSPADM

## 2022-05-11 RX ORDER — FAMOTIDINE 10 MG/ML
20 INJECTION, SOLUTION INTRAVENOUS ONCE
Status: COMPLETED | OUTPATIENT
Start: 2022-05-11 | End: 2022-05-11

## 2022-05-11 RX ADMIN — DEXAMETHASONE SODIUM PHOSPHATE 12 MG: 10 INJECTION, SOLUTION INTRAMUSCULAR; INTRAVENOUS at 09:24

## 2022-05-11 RX ADMIN — CARBOPLATIN 210 MG: 10 INJECTION, SOLUTION INTRAVENOUS at 11:33

## 2022-05-11 RX ADMIN — SODIUM CHLORIDE 250 ML: 9 INJECTION, SOLUTION INTRAVENOUS at 09:24

## 2022-05-11 RX ADMIN — PACLITAXEL 105 MG: 6 INJECTION, SOLUTION INTRAVENOUS at 10:08

## 2022-05-11 RX ADMIN — PALONOSETRON 0.25 MG: 0.25 INJECTION, SOLUTION INTRAVENOUS at 09:28

## 2022-05-11 RX ADMIN — DIPHENHYDRAMINE HYDROCHLORIDE 25 MG: 50 INJECTION INTRAMUSCULAR; INTRAVENOUS at 09:24

## 2022-05-11 RX ADMIN — FAMOTIDINE 20 MG: 10 INJECTION, SOLUTION INTRAVENOUS at 09:25

## 2022-05-11 RX ADMIN — HEPARIN SODIUM (PORCINE) LOCK FLUSH IV SOLN 100 UNIT/ML 500 UNITS: 100 SOLUTION at 12:15

## 2022-05-18 ENCOUNTER — HOSPITAL ENCOUNTER (OUTPATIENT)
Dept: ONCOLOGY | Facility: HOSPITAL | Age: 77
Setting detail: INFUSION SERIES
Discharge: HOME | End: 2022-05-18

## 2022-05-18 ENCOUNTER — OFFICE VISIT (OUTPATIENT)
Dept: ONCOLOGY | Facility: CLINIC | Age: 77
End: 2022-05-18

## 2022-05-18 ENCOUNTER — HOSPITAL ENCOUNTER (OUTPATIENT)
Dept: ONCOLOGY | Facility: HOSPITAL | Age: 77
Discharge: HOME | End: 2022-05-18

## 2022-05-18 VITALS
OXYGEN SATURATION: 96 % | BODY MASS INDEX: 27.82 KG/M2 | TEMPERATURE: 96.1 F | DIASTOLIC BLOOD PRESSURE: 59 MMHG | RESPIRATION RATE: 16 BRPM | HEIGHT: 63 IN | SYSTOLIC BLOOD PRESSURE: 124 MMHG | WEIGHT: 157 LBS | HEART RATE: 66 BPM

## 2022-05-18 VITALS — DIASTOLIC BLOOD PRESSURE: 63 MMHG | HEART RATE: 55 BPM | SYSTOLIC BLOOD PRESSURE: 120 MMHG

## 2022-05-18 DIAGNOSIS — Z17.0 MALIGNANT NEOPLASM OF UPPER-OUTER QUADRANT OF BOTH BREASTS IN FEMALE, ESTROGEN RECEPTOR POSITIVE: ICD-10-CM

## 2022-05-18 DIAGNOSIS — R91.8 MASS OF LEFT LUNG: ICD-10-CM

## 2022-05-18 DIAGNOSIS — C50.412 MALIGNANT NEOPLASM OF UPPER-OUTER QUADRANT OF BOTH BREASTS IN FEMALE, ESTROGEN RECEPTOR POSITIVE: ICD-10-CM

## 2022-05-18 DIAGNOSIS — C54.1 ENDOMETRIAL CANCER: Primary | ICD-10-CM

## 2022-05-18 DIAGNOSIS — C50.411 MALIGNANT NEOPLASM OF UPPER-OUTER QUADRANT OF BOTH BREASTS IN FEMALE, ESTROGEN RECEPTOR POSITIVE: ICD-10-CM

## 2022-05-18 LAB
ALBUMIN SERPL-MCNC: 3.8 G/DL (ref 3.5–5.2)
ALBUMIN/GLOB SERPL: 1.2 G/DL
ALP SERPL-CCNC: 55 U/L (ref 39–117)
ALT SERPL W P-5'-P-CCNC: 14 U/L (ref 1–33)
ANION GAP SERPL CALCULATED.3IONS-SCNC: 9 MMOL/L (ref 5–15)
AST SERPL-CCNC: 18 U/L (ref 1–32)
BILIRUB SERPL-MCNC: 0.4 MG/DL (ref 0–1.2)
BUN SERPL-MCNC: 18 MG/DL (ref 8–23)
BUN/CREAT SERPL: 23.4 (ref 7–25)
CALCIUM SPEC-SCNC: 9 MG/DL (ref 8.6–10.5)
CHLORIDE SERPL-SCNC: 100 MMOL/L (ref 98–107)
CO2 SERPL-SCNC: 24 MMOL/L (ref 22–29)
CREAT SERPL-MCNC: 0.77 MG/DL (ref 0.57–1)
EGFRCR SERPLBLD CKD-EPI 2021: 80.6 ML/MIN/1.73
ERYTHROCYTE [DISTWIDTH] IN BLOOD BY AUTOMATED COUNT: 13.3 % (ref 12.3–15.4)
GLOBULIN UR ELPH-MCNC: 3.1 GM/DL
GLUCOSE SERPL-MCNC: 143 MG/DL (ref 65–99)
HCT VFR BLD AUTO: 34.7 % (ref 34–46.6)
HGB BLD-MCNC: 11 G/DL (ref 12–15.9)
LYMPHOCYTES # BLD AUTO: 1.1 10*3/MM3 (ref 0.7–3.1)
LYMPHOCYTES NFR BLD AUTO: 20.8 % (ref 19.6–45.3)
MCH RBC QN AUTO: 28.6 PG (ref 26.6–33)
MCHC RBC AUTO-ENTMCNC: 31.7 G/DL (ref 31.5–35.7)
MCV RBC AUTO: 90.2 FL (ref 79–97)
MONOCYTES # BLD AUTO: 0.2 10*3/MM3 (ref 0.1–0.9)
MONOCYTES NFR BLD AUTO: 3.4 % (ref 5–12)
NEUTROPHILS NFR BLD AUTO: 3.9 10*3/MM3 (ref 1.7–7)
NEUTROPHILS NFR BLD AUTO: 75.8 % (ref 42.7–76)
PLATELET # BLD AUTO: 151 10*3/MM3 (ref 140–450)
PMV BLD AUTO: 6.8 FL (ref 6–12)
POTASSIUM SERPL-SCNC: 4.1 MMOL/L (ref 3.5–5.2)
PROT SERPL-MCNC: 6.9 G/DL (ref 6–8.5)
RBC # BLD AUTO: 3.84 10*6/MM3 (ref 3.77–5.28)
SODIUM SERPL-SCNC: 133 MMOL/L (ref 136–145)
WBC NRBC COR # BLD: 5.1 10*3/MM3 (ref 3.4–10.8)

## 2022-05-18 PROCEDURE — 25010000002 DEXAMETHASONE SODIUM PHOSPHATE 100 MG/10ML SOLUTION: Performed by: INTERNAL MEDICINE

## 2022-05-18 PROCEDURE — 25010000002 PALONOSETRON 0.25 MG/5ML SOLUTION PREFILLED SYRINGE: Performed by: INTERNAL MEDICINE

## 2022-05-18 PROCEDURE — 80053 COMPREHEN METABOLIC PANEL: CPT | Performed by: INTERNAL MEDICINE

## 2022-05-18 PROCEDURE — 25010000002 DIPHENHYDRAMINE PER 50 MG: Performed by: INTERNAL MEDICINE

## 2022-05-18 PROCEDURE — 25010000002 HEPARIN LOCK FLUSH PER 10 UNITS: Performed by: INTERNAL MEDICINE

## 2022-05-18 PROCEDURE — 25010000002 PACLITAXEL PER 1 MG: Performed by: INTERNAL MEDICINE

## 2022-05-18 PROCEDURE — 85025 COMPLETE CBC W/AUTO DIFF WBC: CPT | Performed by: INTERNAL MEDICINE

## 2022-05-18 PROCEDURE — 96375 TX/PRO/DX INJ NEW DRUG ADDON: CPT

## 2022-05-18 PROCEDURE — 96413 CHEMO IV INFUSION 1 HR: CPT

## 2022-05-18 PROCEDURE — 96417 CHEMO IV INFUS EACH ADDL SEQ: CPT

## 2022-05-18 PROCEDURE — 99214 OFFICE O/P EST MOD 30 MIN: CPT | Performed by: INTERNAL MEDICINE

## 2022-05-18 PROCEDURE — 25010000002 CARBOPLATIN PER 50 MG: Performed by: INTERNAL MEDICINE

## 2022-05-18 RX ORDER — HEPARIN SODIUM (PORCINE) LOCK FLUSH IV SOLN 100 UNIT/ML 100 UNIT/ML
500 SOLUTION INTRAVENOUS AS NEEDED
Status: DISCONTINUED | OUTPATIENT
Start: 2022-05-18 | End: 2022-05-19 | Stop reason: HOSPADM

## 2022-05-18 RX ORDER — FAMOTIDINE 10 MG/ML
20 INJECTION, SOLUTION INTRAVENOUS ONCE
Status: CANCELLED | OUTPATIENT
Start: 2022-06-29

## 2022-05-18 RX ORDER — SODIUM CHLORIDE 0.9 % (FLUSH) 0.9 %
10 SYRINGE (ML) INJECTION AS NEEDED
Status: CANCELLED | OUTPATIENT
Start: 2022-05-18

## 2022-05-18 RX ORDER — PALONOSETRON 0.05 MG/ML
0.25 INJECTION, SOLUTION INTRAVENOUS ONCE
Status: CANCELLED | OUTPATIENT
Start: 2022-06-29

## 2022-05-18 RX ORDER — SODIUM CHLORIDE 9 MG/ML
250 INJECTION, SOLUTION INTRAVENOUS ONCE
Status: CANCELLED | OUTPATIENT
Start: 2022-06-29

## 2022-05-18 RX ORDER — PALONOSETRON 0.05 MG/ML
0.25 INJECTION, SOLUTION INTRAVENOUS ONCE
Status: COMPLETED | OUTPATIENT
Start: 2022-05-18 | End: 2022-05-18

## 2022-05-18 RX ORDER — SODIUM CHLORIDE 9 MG/ML
250 INJECTION, SOLUTION INTRAVENOUS ONCE
Status: COMPLETED | OUTPATIENT
Start: 2022-05-18 | End: 2022-05-18

## 2022-05-18 RX ORDER — PALONOSETRON 0.05 MG/ML
0.25 INJECTION, SOLUTION INTRAVENOUS ONCE
Status: CANCELLED | OUTPATIENT
Start: 2022-07-06

## 2022-05-18 RX ORDER — PALONOSETRON 0.05 MG/ML
0.25 INJECTION, SOLUTION INTRAVENOUS ONCE
Status: CANCELLED | OUTPATIENT
Start: 2022-06-22

## 2022-05-18 RX ORDER — DIPHENHYDRAMINE HYDROCHLORIDE 50 MG/ML
50 INJECTION INTRAMUSCULAR; INTRAVENOUS AS NEEDED
Status: CANCELLED | OUTPATIENT
Start: 2022-07-06

## 2022-05-18 RX ORDER — SODIUM CHLORIDE 9 MG/ML
250 INJECTION, SOLUTION INTRAVENOUS ONCE
Status: CANCELLED | OUTPATIENT
Start: 2022-06-22

## 2022-05-18 RX ORDER — FAMOTIDINE 10 MG/ML
20 INJECTION, SOLUTION INTRAVENOUS ONCE
Status: COMPLETED | OUTPATIENT
Start: 2022-05-18 | End: 2022-05-18

## 2022-05-18 RX ORDER — FAMOTIDINE 10 MG/ML
20 INJECTION, SOLUTION INTRAVENOUS AS NEEDED
Status: CANCELLED | OUTPATIENT
Start: 2022-06-22

## 2022-05-18 RX ORDER — SODIUM CHLORIDE 9 MG/ML
250 INJECTION, SOLUTION INTRAVENOUS ONCE
Status: CANCELLED | OUTPATIENT
Start: 2022-07-06

## 2022-05-18 RX ORDER — FAMOTIDINE 10 MG/ML
20 INJECTION, SOLUTION INTRAVENOUS ONCE
Status: CANCELLED | OUTPATIENT
Start: 2022-07-06

## 2022-05-18 RX ORDER — FAMOTIDINE 10 MG/ML
20 INJECTION, SOLUTION INTRAVENOUS AS NEEDED
Status: CANCELLED | OUTPATIENT
Start: 2022-06-29

## 2022-05-18 RX ORDER — DIPHENHYDRAMINE HYDROCHLORIDE 50 MG/ML
50 INJECTION INTRAMUSCULAR; INTRAVENOUS AS NEEDED
Status: CANCELLED | OUTPATIENT
Start: 2022-06-29

## 2022-05-18 RX ORDER — DIPHENHYDRAMINE HYDROCHLORIDE 50 MG/ML
50 INJECTION INTRAMUSCULAR; INTRAVENOUS AS NEEDED
Status: CANCELLED | OUTPATIENT
Start: 2022-06-22

## 2022-05-18 RX ORDER — HEPARIN SODIUM (PORCINE) LOCK FLUSH IV SOLN 100 UNIT/ML 100 UNIT/ML
500 SOLUTION INTRAVENOUS AS NEEDED
Status: CANCELLED | OUTPATIENT
Start: 2022-05-18

## 2022-05-18 RX ORDER — FAMOTIDINE 10 MG/ML
20 INJECTION, SOLUTION INTRAVENOUS ONCE
Status: CANCELLED | OUTPATIENT
Start: 2022-06-22

## 2022-05-18 RX ORDER — FAMOTIDINE 10 MG/ML
20 INJECTION, SOLUTION INTRAVENOUS AS NEEDED
Status: CANCELLED | OUTPATIENT
Start: 2022-07-06

## 2022-05-18 RX ADMIN — SODIUM CHLORIDE 250 ML: 9 INJECTION, SOLUTION INTRAVENOUS at 09:40

## 2022-05-18 RX ADMIN — DEXAMETHASONE SODIUM PHOSPHATE 12 MG: 10 INJECTION, SOLUTION INTRAMUSCULAR; INTRAVENOUS at 09:40

## 2022-05-18 RX ADMIN — HEPARIN 500 UNITS: 100 SYRINGE at 12:14

## 2022-05-18 RX ADMIN — CARBOPLATIN 190 MG: 10 INJECTION, SOLUTION INTRAVENOUS at 11:38

## 2022-05-18 RX ADMIN — PACLITAXEL 105 MG: 6 INJECTION, SOLUTION INTRAVENOUS at 10:30

## 2022-05-18 RX ADMIN — PALONOSETRON 0.25 MG: 0.25 INJECTION, SOLUTION INTRAVENOUS at 09:39

## 2022-05-18 RX ADMIN — FAMOTIDINE 20 MG: 10 INJECTION INTRAVENOUS at 09:37

## 2022-05-18 RX ADMIN — DIPHENHYDRAMINE HYDROCHLORIDE 25 MG: 50 INJECTION INTRAMUSCULAR; INTRAVENOUS at 09:40

## 2022-05-18 NOTE — PROGRESS NOTES
PROBLEM LIST:  Oncology/Hematology History   Endometrial cancer (HCC)   9/30/2011 Imaging    TVUS and CT scan for palpable left pelvic mass upon annual exam and new abdominal symptoms.      10/6/2011 Surgery    ADY/BSO with pelvic lymph node dissection. Stage 1A grade 2 endometrial adenocarcinoma by final pathology     1/26/2022 Imaging    IMPRESSION:     Hypermetabolic soft tissue nodule in the left breast compatible with  biopsy-proven invasive ductal carcinoma. There is diffuse low level FDG  uptake in the region of recent right breast biopsy site. A  hypermetabolic mass in the left lower lobe is suspicious for pulmonary  metastasis. No additional sites of metastases are identified. There is  no evidence of discrete/focal hypermetabolic lesion of the sternum to  correspond with the indeterminant sternal lesion described on breast MRI  exam.     2/14/2022 Biopsy    Final Diagnosis   LEFT LUNG, BIOPSY:  Metastatic adenocarcinoma.  See comment.  GJK   Electronically signed by Wolfgang Hair MD on 2/22/2022 at 1154   Comment    Sections show small fragments of tumor that are morphologically distinct from the patient's known breast cancers.  Immunoprofile suggests mullerian origin.  Clinical correlation required.  This case was shown for intradepartmental consultation and the other pathologist concurs with the findings interpretation.  This case was discussed with Dr. Trevino on 02/21/22.  This case was discussed with Dr. Reed on 02/22/22.        5/4/2022 -  Chemotherapy    OP GYN PACLitaxel / CARBOplatin AUC=2 (Weekly)     5/4/2022 -  Chemotherapy    OP CENTRAL VENOUS ACCESS DEVICE ACCESS, CARE, AND MAINTENANCE ([facility])     Malignant neoplasm of upper-outer quadrant of both breasts in female, estrogen receptor positive (HCC)   1/21/2022 Initial Diagnosis    Malignant neoplasm of upper-outer quadrant of both breasts in female, estrogen receptor positive (HCC)     1/24/2022 Biopsy    1.  Right breast cancer-invasive  ductal carcinoma grade 2-ER negative LA negative HER-2 negative    2.  Left breast cancer-invasive ductal carcinoma grade 2-ER positive LA negative HER-2 negative     1/26/2022 Imaging    EXAMINATION: NM PET/CT SKULL BASE TO MID THIGH      FINDINGS:      Today's 3-D images demonstrate focal hypermetabolism in the soft tissues  of the left breast as well as focal hypermetabolism within the left  midlung.     Multiplanar images of the head and neck demonstrate symmetric FDG uptake  within the brain. There is no evidence of hypermetabolic neck mass or  lymph node.     In the upper outer quadrant of the left breast there is redemonstration  of a irregular 2.1 cm soft tissue nodule with FDG hypermetabolism of SUV  max 4.2, compatible with biopsy-proven invasive ductal carcinoma. There  is an ill-defined diffuse region of low-level hypermetabolism in the  right breast with adjacent biopsy clip and single locule of soft tissue  air, compatible with recent right breast biopsy. A discrete  hypermetabolic nodule or mass in the right breast is not confidently  identified. There is no hypermetabolic or enlarged axillary lymph nodes.     Within the chest there is redemonstration of a lobulated soft tissue  mass in the superior aspect of the left lower lobe which appears  hypermetabolic with SUV max 6.5. There is no evidence of hypermetabolic  mediastinal or hilar adenopathy.     Below the diaphragm there is expected physiologic uptake within the   and GI tracts. No evidence of abdominal pelvic malignancy or metastasis.  No hypermetabolic abdominopelvic lymph nodes. Photopenic left renal cyst  is noted.     There is no hypermetabolic pathologic marrow process. Specifically,  there is no evidence of hypermetabolic lesion of the sternum to  correspond with the indeterminant sternal lesion detailed on prior  breast MRI exam. FDG uptake at the left antecubital fossa reflects site  of IV administration.     IMPRESSION:      Hypermetabolic soft tissue nodule in the left breast compatible with  biopsy-proven invasive ductal carcinoma. There is diffuse low level FDG  uptake in the region of recent right breast biopsy site. A  hypermetabolic mass in the left lower lobe is suspicious for pulmonary  metastasis. No additional sites of metastases are identified. There is  no evidence of discrete/focal hypermetabolic lesion of the sternum to  correspond with the indeterminant sternal lesion described on breast MRI  exam.           4/20/2022 Cancer Staged    Staging form: Breast, AJCC 8th Edition  - Pathologic: Stage IB (pT1c, pN0, cM0, G3, ER-, NH-, HER2-) - Signed by Ruby Trevino MD on 4/20/2022 5/4/2022 -  Chemotherapy    OP CENTRAL VENOUS ACCESS DEVICE ACCESS, CARE, AND MAINTENANCE ([facility])     Malignant neoplasm metastatic to left lung (HCC) (Resolved)   3/23/2022 Initial Diagnosis    Malignant neoplasm metastatic to left lung (HCC) (Resolved)     4/26/2022 - 4/26/2022 Radiation    Radiation OncologyTreatment Course:  Kenisha Jama received 2500 cGy in 1 fraction to left lung metastasis via Stereotactic Radiation Therapy - SRT.         REASON FOR VISIT: Management of my cancers     HISTORY OF PRESENT ILLNESS:   76 y.o.  female presents today for follow-up.  She is currently on carboplatin and Taxol weekly.  We are treating her adjuvantly for both triple negative breast cancer and endometrial cancer.  She had CyberKnife to the endometrial cancer focus in the lung.  She is tolerating her treatment reasonably well.  No significant issues with side effects.  No recent infections.  No significant neuropathy.    Past medical history, social history and family history was reviewed 05/18/22 and unchanged from prior visit.    Review of Systems:    Review of Systems   Constitutional: Negative.    HENT:  Negative.    Eyes: Negative.    Respiratory: Negative.    Cardiovascular: Negative.    Gastrointestinal: Negative.    Endocrine:  Negative.    Genitourinary: Negative.     Musculoskeletal: Negative.    Skin: Negative.    Neurological: Negative.    Hematological: Negative.    Psychiatric/Behavioral: Negative.             Medications:        Current Outpatient Medications:   •  acetaminophen (Tylenol 8 Hour) 650 MG 8 hr tablet, Take 1 tablet by mouth Every 8 (Eight) Hours As Needed for Mild Pain ., Disp: 40 tablet, Rfl: 0  •  carvedilol (COREG) 12.5 MG tablet, Take 12.5 mg by mouth 2 (Two) Times a Day With Meals., Disp: , Rfl:   •  ezetimibe (ZETIA) 10 MG tablet, Take 10 mg by mouth Every Night., Disp: , Rfl:   •  ferrous sulfate 325 (65 FE) MG tablet, Take 325 mg by mouth Daily With Breakfast., Disp: , Rfl:   •  ibuprofen (ADVIL,MOTRIN) 600 MG tablet, Take 1 tablet by mouth Every 6 (Six) Hours As Needed for Mild Pain ., Disp: 15 tablet, Rfl: 0  •  lidocaine-prilocaine (EMLA) 2.5-2.5 % cream, Apply 1 application topically to the appropriate area as directed As Needed for Injection Site Pain. Apply 45-60 minutes before port access, cover with plastic wrap after application., Disp: 5 g, Rfl: 5  •  lisinopril (PRINIVIL,ZESTRIL) 40 MG tablet, Take 40 mg by mouth Daily., Disp: , Rfl:   •  NIFEdipine XL (PROCARDIA XL) 30 MG 24 hr tablet, Take 30 mg by mouth Daily., Disp: , Rfl:   •  ondansetron (ZOFRAN) 8 MG tablet, Take 1 tablet by mouth 3 (Three) Times a Day As Needed for Nausea or Vomiting., Disp: 30 tablet, Rfl: 5  •  pantoprazole (PROTONIX) 40 MG EC tablet, , Disp: , Rfl:   •  pravastatin (PRAVACHOL) 80 MG tablet, , Disp: , Rfl: 1  •  ursodiol (ACTIGALL) 500 MG tablet, , Disp: , Rfl:     Current Facility-Administered Medications:   •  lidocaine (XYLOCAINE) 1 % injection 5 mL, 5 mL, Infiltration, Once, Svetlana Juárez MD    Facility-Administered Medications Ordered in Other Visits:   •  heparin injection 500 Units, 500 Units, Intravenous, PRN, Ruby Trevino MD    Pain Medications             acetaminophen (Tylenol 8 Hour) 650 MG 8 hr  tablet Take 1 tablet by mouth Every 8 (Eight) Hours As Needed for Mild Pain .    ibuprofen (ADVIL,MOTRIN) 600 MG tablet Take 1 tablet by mouth Every 6 (Six) Hours As Needed for Mild Pain .             ALLERGIES:  No Known Allergies      Physical Exam    VITAL SIGNS:  There were no vitals taken for this visit.                Wt Readings from Last 3 Encounters:   05/11/22 72.1 kg (159 lb)   05/04/22 72.6 kg (160 lb)   05/02/22 72.3 kg (159 lb 6.4 oz)       There is no height or weight on file to calculate BMI. There is no height or weight on file to calculate BSA.    There were no vitals filed for this visit.        Performance Status: 0    General: well appearing, in no acute distress  HEENT: sclera anicteric, neck is supple  Lymphatics: no cervical, supraclavicular, or axillary adenopathy  Cardiovascular: regular rate and rhythm, no murmurs, rubs or gallops  Lungs: clear to auscultation bilaterally  Abdomen: soft, nontender, nondistended.  No palpable organomegaly  Extremities: no lower extremity edema  Skin: no rashes, lesions, bruising, or petechiae  Msk:  Shows no weakness of the large muscle groups  Psych: Mood is stable        RECENT LABS:    Lab Results   Component Value Date    HGB 11.0 (L) 05/18/2022    HCT 34.7 05/18/2022    MCV 90.2 05/18/2022     05/18/2022    WBC 5.10 05/18/2022    NEUTROABS 3.90 05/18/2022    LYMPHSABS 1.10 05/18/2022    MONOSABS 0.20 05/18/2022    EOSABS 0.09 02/14/2022    BASOSABS 0.04 02/14/2022       Lab Results   Component Value Date    GLUCOSE 143 (H) 05/18/2022    BUN 18 05/18/2022    CREATININE 0.77 05/18/2022     (L) 05/18/2022    K 4.1 05/18/2022     05/18/2022    CO2 24.0 05/18/2022    CALCIUM 9.0 05/18/2022    PROTEINTOT 6.9 05/18/2022    ALBUMIN 3.80 05/18/2022    BILITOT 0.4 05/18/2022    ALKPHOS 55 05/18/2022    AST 18 05/18/2022    ALT 14 05/18/2022       CT Chest With Contrast Diagnostic    Result Date: 1/10/2022  CT demonstrates no specific osseous  correlate to the sternal lesion noted on recent MRI. No corresponding lytic or sclerotic osseous lesion is present. There is no evidence of cortical breakthrough.  An irregular lobulated homogeneous 3.7 x 2.1 cm nodule is noted along the fissure the left lung base. Findings remain suspicious for metastatic involvement, however granulomatous process or possibly pulmonary AVM could have similar appearance. PET/CT would be useful to characterize but this finding and further evaluate for possible CT occult sternal osseous metastasis.  Redemonstration of bilateral soft tissue breast masses concerning for malignancy, better characterized on recent MRI.  This report was finalized on 1/10/2022 3:34 PM by Rodríguez Bacon.      IR Fluoro Guidance Only for Central Line    Result Date: 3/16/2022  10 seconds intraoperative fluoroscopy time during port placement performed by Dr. Reed. Details the procedure can be found in Dr. Reed's note.  This report was finalized on 3/16/2022 2:10 PM by Shemar Ulrich MD.      XR Chest 1 View    Result Date: 3/16/2022  No pneumothorax status post port placement  Atelectasis in the lower left lung  This report was finalized on 3/16/2022 1:22 PM by Pepito Leblanc.      XR Chest 1 View    Result Date: 2/14/2022  No pneumothorax.  This report was finalized on 2/14/2022 2:29 PM by Rodríguez Bacon.      XR Chest 1 View    Result Date: 2/14/2022  Left lower lobe pulmonary nodule faintly seen. There is no pneumothorax or other evident immediate complication. Unchanged heart and mediastinal contours.  This report was finalized on 2/14/2022 1:06 PM by Rodríguez Bacon.      CT Needle Biopsy Lung    Result Date: 2/16/2022  Impression:                                                              Successful CT guided core biopsy of a mass in the left lobe of the lung as described above.  Thank you for the opportunity to assist in the care of your patient.  This report was finalized on 2/16/2022 8:39 AM by  "Geovani Wilkins MD.      Mammo Post Clip Placement Right    Result Date: 1/27/2022  Findings highly suggestive of malignancy at the 11:00 position of the right breast both mammographically and sonographically.   BI-RADS CATEGORY:  5, HIGHLY SUGGESTIVE OF MALIGNANCY   RECOMMENDATION:  Ultrasound-guided biopsy  CAD was utilized.     10G ELEVATION VACUUM-ASSISTED ULTRASOUND GUIDED CORE BIOPSY    History: Findings highly suggestive of malignancy at the right 11-11 30 position  Procedure: Written and verbal consent was obtained for ultrasound guided core biopsy of a 2 to 3 cm centimeter ill-defined spiculated area corresponding to MRI nonmass enhancement at the right 11-11 30 position \" Time out\" was observed to verify the patient's identity and correct location of the breast abnormality. The presence of the lesion was confirmed ultrasound and a lateral approach was chosen. The breast was prepped and draped in the usual sterile fashion and 10 mL 1% lidocaine with and without epinephrine was utilized for local anesthesia. A small skin incision was made with a scalpel and a 10-gauge needle with an overlying sheath attached to the core biopsy device was introduced into the breast under direct sonographic guidance. The needle was placed within to the mass. A total of 6 core samples were obtained. The specimens were placed in formalin and forwarded to the pathology department. A post biopsy marking clip was placed. Post biopsy mammographic images were obtained. The clip was noted to be in excellent position in the anterior aspect of the 2 adjacent areas of nonmass enhancement seen by MRI  Upon completion of the procedure, compression was applied to the biopsy site until all appreciable bleeding subsided and a sterile dressing was applied. Post biopsy instructions were reviewed with the patient by our clinical breast imaging staff. A written copy of these instructions was also given to the patient. The patient tolerated the " procedure well and no immediate complications occurred.   SUMMARY: 10-gauge ultrasound guided and vacuum assisted core biopsy of a 2-3 cm right breast mass located at the 11-11 30 position.  A post biopsy marking clip was placed.  PATHOLOGY: Invasive ductal carcinoma ER negative, NE negative, HER-2/mary equivocal. Findings are concordant. The patient will be referred to back to [facility]  This report was finalized on 1/27/2022 11:40 AM by Dr. Svetlana Juárez MD.      MRI Breast Bilateral Diagnostic With & Without Contrast    Result Date: 12/30/2021  1. Known biopsy-proven malignancy on the left 2. Findings highly suggestive of malignancy on the right as described 3. Indeterminate sternal lesion measuring almost 2 cm with questionable expansion/erosion of the anterior sternal cortex. Dedicated bone imaging is recommended.  RECOMMENDATIONS: 1. Diagnostic right mammography and sonography for evaluation for biopsy of the adjacent areas of concern in the right upper outer quadrant. 2. Dedicated bone evaluation of the sternal lesion.  BI-RADS CATEGORY: 5, HIGHLY SUGGESTIVE OF MALIGNANCY  FINAL MRI RESULTS AND RECOMMENDATIONS WILL BE CALLED TO THE PATIENT BY A BREAST CARE NURSE.  This report was finalized on 12/30/2021 11:48 AM by Dr. Svetlana Juárez MD.      NM PET/CT Skull Base to Mid Thigh    Result Date: 1/26/2022   Hypermetabolic soft tissue nodule in the left breast compatible with biopsy-proven invasive ductal carcinoma. There is diffuse low level FDG uptake in the region of recent right breast biopsy site. A hypermetabolic mass in the left lower lobe is suspicious for pulmonary metastasis. No additional sites of metastases are identified. There is no evidence of discrete/focal hypermetabolic lesion of the sternum to correspond with the indeterminant sternal lesion described on breast MRI exam.   This report was finalized on 1/26/2022 3:33 PM by Anil Saha MD.      Mammo Breast Placement Device Initial  "Without Biopsy    Result Date: 3/19/2022  Successful needle localization of the known malignancy in the left upper outer quadrant.  Surgical excision will be performed by Dr. Reed.  FINAL PATHOLOGY: Invasive poorly differentiated ductal carcinoma. Gross tumor size 2.8 cm. 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 Positive margins for invasive carcinoma. Findings are concordant. The patient is being followed by [facility].  This report was finalized on 3/19/2022 11:26 AM by Dr. Svetlana Juárez MD.      Mammo Breast Placement Device Initial Without Biopsy    Result Date: 3/19/2022  Successful needle localization of the known right upper outer quadrant malignancy.  Surgical excision will be performed by Dr. Reed.  FINAL PATHOLOGY OF THE RIGHT BREAST: Invasive moderately differentiated ductal carcinoma. Gross tumor size 11 mm. Focal high-grade DCIS. Invasive carcinoma present at the deep/posterior surgical margin. The patient is being cared for by [facility].  This report was finalized on 3/19/2022 11:24 AM by Dr. Svetlana Juárez MD.      Mammo Diagnostic Digital Tomosynthesis Right With CAD    Result Date: 1/27/2022  Findings highly suggestive of malignancy at the 11:00 position of the right breast both mammographically and sonographically.   BI-RADS CATEGORY:  5, HIGHLY SUGGESTIVE OF MALIGNANCY   RECOMMENDATION:  Ultrasound-guided biopsy  CAD was utilized.     10G ELEVATION VACUUM-ASSISTED ULTRASOUND GUIDED CORE BIOPSY    History: Findings highly suggestive of malignancy at the right 11-11 30 position  Procedure: Written and verbal consent was obtained for ultrasound guided core biopsy of a 2 to 3 cm centimeter ill-defined spiculated area corresponding to MRI nonmass enhancement at the right 11-11 30 position \" Time out\" was observed to verify the patient's identity and correct location of the breast abnormality. The presence of the lesion was confirmed ultrasound and a " lateral approach was chosen. The breast was prepped and draped in the usual sterile fashion and 10 mL 1% lidocaine with and without epinephrine was utilized for local anesthesia. A small skin incision was made with a scalpel and a 10-gauge needle with an overlying sheath attached to the core biopsy device was introduced into the breast under direct sonographic guidance. The needle was placed within to the mass. A total of 6 core samples were obtained. The specimens were placed in formalin and forwarded to the pathology department. A post biopsy marking clip was placed. Post biopsy mammographic images were obtained. The clip was noted to be in excellent position in the anterior aspect of the 2 adjacent areas of nonmass enhancement seen by MRI  Upon completion of the procedure, compression was applied to the biopsy site until all appreciable bleeding subsided and a sterile dressing was applied. Post biopsy instructions were reviewed with the patient by our clinical breast imaging staff. A written copy of these instructions was also given to the patient. The patient tolerated the procedure well and no immediate complications occurred.   SUMMARY: 10-gauge ultrasound guided and vacuum assisted core biopsy of a 2-3 cm right breast mass located at the 11-11 30 position.  A post biopsy marking clip was placed.  PATHOLOGY: Invasive ductal carcinoma ER negative, [place] negative, HER-2/mary equivocal. Findings are concordant. The patient will be referred to back to [facility]  This report was finalized on 1/27/2022 11:40 AM by Dr. Svetlana Juárez MD.      US Guided Breast Biopsy With & Without Device initial    Result Date: 1/27/2022  Findings highly suggestive of malignancy at the 11:00 position of the right breast both mammographically and sonographically.   BI-RADS CATEGORY:  5, HIGHLY SUGGESTIVE OF MALIGNANCY   RECOMMENDATION:  Ultrasound-guided biopsy  CAD was utilized.     10G ELEVATION VACUUM-ASSISTED ULTRASOUND  "GUIDED CORE BIOPSY    History: Findings highly suggestive of malignancy at the right 11-11 30 position  Procedure: Written and verbal consent was obtained for ultrasound guided core biopsy of a 2 to 3 cm centimeter ill-defined spiculated area corresponding to MRI nonmass enhancement at the right 11-11 30 position \" Time out\" was observed to verify the patient's identity and correct location of the breast abnormality. The presence of the lesion was confirmed ultrasound and a lateral approach was chosen. The breast was prepped and draped in the usual sterile fashion and 10 mL 1% lidocaine with and without epinephrine was utilized for local anesthesia. A small skin incision was made with a scalpel and a 10-gauge needle with an overlying sheath attached to the core biopsy device was introduced into the breast under direct sonographic guidance. The needle was placed within to the mass. A total of 6 core samples were obtained. The specimens were placed in formalin and forwarded to the pathology department. A post biopsy marking clip was placed. Post biopsy mammographic images were obtained. The clip was noted to be in excellent position in the anterior aspect of the 2 adjacent areas of nonmass enhancement seen by MRI  Upon completion of the procedure, compression was applied to the biopsy site until all appreciable bleeding subsided and a sterile dressing was applied. Post biopsy instructions were reviewed with the patient by our clinical breast imaging staff. A written copy of these instructions was also given to the patient. The patient tolerated the procedure well and no immediate complications occurred.   SUMMARY: 10-gauge ultrasound guided and vacuum assisted core biopsy of a 2-3 cm right breast mass located at the 11-11 30 position.  A post biopsy marking clip was placed.  PATHOLOGY: Invasive ductal carcinoma ER negative, [place] negative, HER-2/mary equivocal. Findings are concordant. The patient will be referred to back " to [facility]  This report was finalized on 1/27/2022 11:40 AM by Dr. Svetlana Juárez MD.      Final Diagnosis   1.  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  See template #1    2.  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  See template #2    3.  LEFT BREAST, EXTENDED MEDIAL, SUPERIOR, AND DEEP MARGIN:                 Focal residual carcinoma measuring 3 mm near anterior aspect                New anterior margin width is 9 mm    INVASIVE BREAST CANCER STAGING TEMPLATE #1    TYPE OF SPECIMEN/PROCEDURE: Needle localized lumpectomy  SPECIMEN LATERALITY: Right  TUMOR SITE: 11:00  TUMOR SIZE: 11 mm  TUMOR FOCALITY: Single focus  HISTOLOGIC TYPE OF INVASIVE CARCINOMA: Ductal with apocrine morphology  rdGrdRrdArdDrdErd:rd rd3rd Tubule formation: 3                Mitotic activity: 1                Pleomorphism: 3  OVERALL SCORE: 7/9  DUCTAL CARCINOMA IN SITU EXTENT: Focal  TUMOR EXTENSION: Structures not present                Skin: N/A                Skin satellite nodule: N/A                Nipple: N/a                Skeletal muscle: N/A  SURGICAL MARGINS: Invasive carcinoma  present at margin                Invasive carcinoma margins: Tumor present at margin                Distance from closest margin: 0 mm                Specific closest margin: Posterior                   DCIS margins: Uninvolved by DCIS                Distance from closest margin: 6 mm                Specific closest margin: Posterior    LYMPH NODE STATUS: None submitted                Number of lymph nodes with macrometastasis: N/A                Number of lymph nodes with micrometastasis: N/A                Number of lymph nodes with isolated tumor cells: N/A  TOTAL NUMBER OF LYMPH NODES EXAMINED: 0  NUMBER OF SENTINEL NODES EXAMINED: 0  EXTRANODAL EXTENSION OF TUMOR: N/A  SIZE OF LARGEST ENOCH METASTATIC DEPOSIT: N/A  VASCULAR/LYMPHATIC INVASION: N/A  DISTANT SITES INVOLVED: Not applicable  ESTROGEN RECEPTOR STATUS BY IHC METHOD: Negative per previous biopsy  PROGESTERONE RECEPTOR STATUS BY IHC METHOD: Negative per previous biopsy  HER-2/LAURA ONCOPROTEIN STATUS BY IHC METHOD: Equivocal (2+) per previous biopsy  HER-2/LAURA ONCOGENE STATUS BY IN SITU HYBRIDIZATION ANALYSIS: Not amplified  BIOPSY UNDER WHICH BREAST BIOMARKERS PERFORMED: SX   TREATMENT EFFECT (BREAST): No known presurgical therapy  TREATMENT EFFECT (LYMPH NODE): No known presurgical therapy  ADDITIONAL PATHOLOGIC FINDINGS: Fibrocystic change  OTHER STUDIES: Available upon request  AJCC PATHOLOGIC STAGE: (COMPLETED BY PATHOLOGIST, BASED ONLY ON TISSUE FINDINGS; MORE EXTENSIVE DISEASE MAY NOT BE KNOWN TO THE PATHOLOGIST)  pT = 1c  pN = x  pM = N/A      INVASIVE BREAST CANCER STAGING TEMPLATE #2    TYPE OF SPECIMEN/PROCEDURE: Needle localized lumpectomy  SPECIMEN LATERALITY: Left  TUMOR SITE: 2:00  TUMOR SIZE: 28 mm  TUMOR FOCALITY: Single focus  HISTOLOGIC TYPE OF INVASIVE CARCINOMA: Ductal  thGthRthAthDthEth:th th4th Tubule formation: 3                Mitotic activity: 2                Pleomorphism: 3  OVERALL SCORE: 8/9  DUCTAL CARCINOMA IN SITU  EXTENT: 12 mm  TUMOR EXTENSION: Structures not present                Skin: N/A                Skin satellite nodule: N/A                Nipple: N/A                Skeletal muscle: N/A  SURGICAL MARGINS: Final margins uninvolved by in situ or invasive neoplasm                Invasive carcinoma margins: Extended margin uninvolved                Distance from closest margin: Extended margin 9 mm                Specific closest margin: Anterior                   DCIS margins: Uninvolved                Distance from closest margin: 12 mm                Specific closest margin: Posterior    LYMPH NODE STATUS: No lymph nodes submitted                Number of lymph nodes with macrometastasis: N/A                Number of lymph nodes with micrometastasis: N/A                Number of lymph nodes with isolated tumor cells: N/A  TOTAL NUMBER OF LYMPH NODES EXAMINED: 0  NUMBER OF SENTINEL NODES EXAMINED: 0  EXTRANODAL EXTENSION OF TUMOR: N/A  SIZE OF LARGEST ENOCH METASTATIC DEPOSIT: N/A  VASCULAR/LYMPHATIC INVASION: N/A  DISTANT SITES INVOLVED: N/A  ESTROGEN RECEPTOR STATUS BY IHC METHOD: Positive per previous biopsy  PROGESTERONE RECEPTOR STATUS BY IHC METHOD: Negative per previous biopsy  HER-2/LAURA ONCOPROTEIN STATUS BY IHC METHOD: Negative (0+) per previous biopsy   HER-2/LAURA ONCOGENE STATUS BY IN SITU HYBRIDIZATION ANALYSIS: Not performed  BIOPSY UNDER WHICH BREAST BIOMARKERS PERFORMED: [id number]  TREATMENT EFFECT (BREAST): No known presurgical therapy  TREATMENT EFFECT (LYMPH NODE): No known presurgical therapy  ADDITIONAL PATHOLOGIC FINDINGS: None significant  OTHER STUDIES: Available upon request  AJCC PATHOLOGIC STAGE: (COMPLETED BY PATHOLOGIST, BASED ONLY ON TISSUE FINDINGS; MORE EXTENSIVE DISEASE MAY NOT BE KNOWN TO THE PATHOLOGIST)  pT = 2  pN = x  pM = not applicable            Assessment/Plan    1.  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  So far  tolerated first cycle of treatment without any significant side effects.  We will proceed with no dose reduction today.  Plan is for 6 cycles of carboplatin and Taxol in the adjuvant setting.  She will subsequently receive radiotherapy and be placed on indefinite hormone blockade.    2.  Metastatic endometrial cancer.  She had a single focus of disease in her lung.  This was treated with CyberKnife.  She is getting adjuvant carboplatin and Taxol.  I plan to treat her with hormone blockade after this since she is ER positive on this.      Ruby Trevino MD  [facility]         No orders of the defined types were placed in this encounter.      5/18/2022

## 2022-05-26 ENCOUNTER — HOSPITAL ENCOUNTER (OUTPATIENT)
Dept: ONCOLOGY | Facility: HOSPITAL | Age: 77
Setting detail: INFUSION SERIES
Discharge: HOME | End: 2022-05-26

## 2022-05-26 VITALS
RESPIRATION RATE: 16 BRPM | DIASTOLIC BLOOD PRESSURE: 61 MMHG | HEIGHT: 63 IN | WEIGHT: 157.9 LBS | SYSTOLIC BLOOD PRESSURE: 118 MMHG | TEMPERATURE: 98.5 F | HEART RATE: 56 BPM | BODY MASS INDEX: 27.98 KG/M2

## 2022-05-26 DIAGNOSIS — C50.411 MALIGNANT NEOPLASM OF UPPER-OUTER QUADRANT OF BOTH BREASTS IN FEMALE, ESTROGEN RECEPTOR POSITIVE: ICD-10-CM

## 2022-05-26 DIAGNOSIS — C54.1 ENDOMETRIAL CANCER: Primary | ICD-10-CM

## 2022-05-26 DIAGNOSIS — Z17.0 MALIGNANT NEOPLASM OF UPPER-OUTER QUADRANT OF BOTH BREASTS IN FEMALE, ESTROGEN RECEPTOR POSITIVE: ICD-10-CM

## 2022-05-26 DIAGNOSIS — C50.412 MALIGNANT NEOPLASM OF UPPER-OUTER QUADRANT OF BOTH BREASTS IN FEMALE, ESTROGEN RECEPTOR POSITIVE: ICD-10-CM

## 2022-05-26 DIAGNOSIS — R91.8 MASS OF LEFT LUNG: ICD-10-CM

## 2022-05-26 LAB
ALBUMIN SERPL-MCNC: 4 G/DL (ref 3.5–5.2)
ALBUMIN/GLOB SERPL: 1.4 G/DL
ALP SERPL-CCNC: 56 U/L (ref 39–117)
ALT SERPL W P-5'-P-CCNC: 15 U/L (ref 1–33)
ANION GAP SERPL CALCULATED.3IONS-SCNC: 9 MMOL/L (ref 5–15)
AST SERPL-CCNC: 17 U/L (ref 1–32)
BILIRUB SERPL-MCNC: 0.5 MG/DL (ref 0–1.2)
BUN SERPL-MCNC: 16 MG/DL (ref 8–23)
BUN/CREAT SERPL: 22.9 (ref 7–25)
CALCIUM SPEC-SCNC: 9.1 MG/DL (ref 8.6–10.5)
CANCER AG125 SERPL QL: 17.4 U/ML (ref 0–38.1)
CHLORIDE SERPL-SCNC: 102 MMOL/L (ref 98–107)
CO2 SERPL-SCNC: 25 MMOL/L (ref 22–29)
CREAT SERPL-MCNC: 0.7 MG/DL (ref 0.57–1)
EGFRCR SERPLBLD CKD-EPI 2021: 90.3 ML/MIN/1.73
ERYTHROCYTE [DISTWIDTH] IN BLOOD BY AUTOMATED COUNT: 13.5 % (ref 12.3–15.4)
GLOBULIN UR ELPH-MCNC: 2.8 GM/DL
GLUCOSE SERPL-MCNC: 127 MG/DL (ref 65–99)
HCT VFR BLD AUTO: 29.5 % (ref 34–46.6)
HGB BLD-MCNC: 9.4 G/DL (ref 12–15.9)
LYMPHOCYTES # BLD AUTO: 0.8 10*3/MM3 (ref 0.7–3.1)
LYMPHOCYTES NFR BLD AUTO: 24.1 % (ref 19.6–45.3)
MCH RBC QN AUTO: 28.7 PG (ref 26.6–33)
MCHC RBC AUTO-ENTMCNC: 31.9 G/DL (ref 31.5–35.7)
MCV RBC AUTO: 89.9 FL (ref 79–97)
MONOCYTES # BLD AUTO: 0.3 10*3/MM3 (ref 0.1–0.9)
MONOCYTES NFR BLD AUTO: 8.7 % (ref 5–12)
NEUTROPHILS NFR BLD AUTO: 2.4 10*3/MM3 (ref 1.7–7)
NEUTROPHILS NFR BLD AUTO: 67.2 % (ref 42.7–76)
PLATELET # BLD AUTO: 115 10*3/MM3 (ref 140–450)
PMV BLD AUTO: 6.1 FL (ref 6–12)
POTASSIUM SERPL-SCNC: 4.3 MMOL/L (ref 3.5–5.2)
PROT SERPL-MCNC: 6.8 G/DL (ref 6–8.5)
RBC # BLD AUTO: 3.28 10*6/MM3 (ref 3.77–5.28)
SODIUM SERPL-SCNC: 136 MMOL/L (ref 136–145)
WBC NRBC COR # BLD: 3.5 10*3/MM3 (ref 3.4–10.8)

## 2022-05-26 PROCEDURE — 96375 TX/PRO/DX INJ NEW DRUG ADDON: CPT

## 2022-05-26 PROCEDURE — 25010000002 CARBOPLATIN PER 50 MG: Performed by: INTERNAL MEDICINE

## 2022-05-26 PROCEDURE — 25010000002 PACLITAXEL PER 1 MG: Performed by: INTERNAL MEDICINE

## 2022-05-26 PROCEDURE — 25010000002 PALONOSETRON 0.25 MG/5ML SOLUTION PREFILLED SYRINGE: Performed by: INTERNAL MEDICINE

## 2022-05-26 PROCEDURE — 86304 IMMUNOASSAY TUMOR CA 125: CPT | Performed by: INTERNAL MEDICINE

## 2022-05-26 PROCEDURE — 85025 COMPLETE CBC W/AUTO DIFF WBC: CPT | Performed by: INTERNAL MEDICINE

## 2022-05-26 PROCEDURE — 80053 COMPREHEN METABOLIC PANEL: CPT | Performed by: INTERNAL MEDICINE

## 2022-05-26 PROCEDURE — 25010000002 HEPARIN LOCK FLUSH PER 10 UNITS: Performed by: INTERNAL MEDICINE

## 2022-05-26 PROCEDURE — 96417 CHEMO IV INFUS EACH ADDL SEQ: CPT

## 2022-05-26 PROCEDURE — 96413 CHEMO IV INFUSION 1 HR: CPT

## 2022-05-26 PROCEDURE — 25010000002 DIPHENHYDRAMINE PER 50 MG: Performed by: INTERNAL MEDICINE

## 2022-05-26 PROCEDURE — 25010000002 DEXAMETHASONE SODIUM PHOSPHATE 100 MG/10ML SOLUTION: Performed by: INTERNAL MEDICINE

## 2022-05-26 RX ORDER — HEPARIN SODIUM (PORCINE) LOCK FLUSH IV SOLN 100 UNIT/ML 100 UNIT/ML
500 SOLUTION INTRAVENOUS AS NEEDED
Status: CANCELLED | OUTPATIENT
Start: 2022-05-26

## 2022-05-26 RX ORDER — SODIUM CHLORIDE 9 MG/ML
250 INJECTION, SOLUTION INTRAVENOUS ONCE
Status: COMPLETED | OUTPATIENT
Start: 2022-05-26 | End: 2022-05-26

## 2022-05-26 RX ORDER — HEPARIN SODIUM (PORCINE) LOCK FLUSH IV SOLN 100 UNIT/ML 100 UNIT/ML
500 SOLUTION INTRAVENOUS AS NEEDED
Status: DISCONTINUED | OUTPATIENT
Start: 2022-05-26 | End: 2022-05-27 | Stop reason: HOSPADM

## 2022-05-26 RX ORDER — PALONOSETRON 0.05 MG/ML
0.25 INJECTION, SOLUTION INTRAVENOUS ONCE
Status: COMPLETED | OUTPATIENT
Start: 2022-05-26 | End: 2022-05-26

## 2022-05-26 RX ORDER — SODIUM CHLORIDE 0.9 % (FLUSH) 0.9 %
10 SYRINGE (ML) INJECTION AS NEEDED
Status: DISCONTINUED | OUTPATIENT
Start: 2022-05-26 | End: 2022-05-27 | Stop reason: HOSPADM

## 2022-05-26 RX ORDER — FAMOTIDINE 20 MG/1
20 TABLET, FILM COATED ORAL ONCE
Status: COMPLETED | OUTPATIENT
Start: 2022-05-26 | End: 2022-05-26

## 2022-05-26 RX ORDER — SODIUM CHLORIDE 0.9 % (FLUSH) 0.9 %
10 SYRINGE (ML) INJECTION AS NEEDED
Status: CANCELLED | OUTPATIENT
Start: 2022-05-26

## 2022-05-26 RX ADMIN — SODIUM CHLORIDE 250 ML: 9 INJECTION, SOLUTION INTRAVENOUS at 11:58

## 2022-05-26 RX ADMIN — CARBOPLATIN 210 MG: 10 INJECTION, SOLUTION INTRAVENOUS at 14:04

## 2022-05-26 RX ADMIN — PACLITAXEL 105 MG: 6 INJECTION, SOLUTION INTRAVENOUS at 12:45

## 2022-05-26 RX ADMIN — PALONOSETRON 0.25 MG: 0.25 INJECTION, SOLUTION INTRAVENOUS at 11:58

## 2022-05-26 RX ADMIN — HEPARIN SODIUM (PORCINE) LOCK FLUSH IV SOLN 100 UNIT/ML 500 UNITS: 100 SOLUTION at 14:35

## 2022-05-26 RX ADMIN — DIPHENHYDRAMINE HYDROCHLORIDE 25 MG: 50 INJECTION INTRAMUSCULAR; INTRAVENOUS at 11:58

## 2022-05-26 RX ADMIN — Medication 10 ML: at 14:35

## 2022-05-26 RX ADMIN — FAMOTIDINE 20 MG: 20 TABLET ORAL at 11:58

## 2022-05-26 RX ADMIN — DEXAMETHASONE SODIUM PHOSPHATE 12 MG: 10 INJECTION, SOLUTION INTRAMUSCULAR; INTRAVENOUS at 11:58

## 2022-06-01 ENCOUNTER — HOSPITAL ENCOUNTER (OUTPATIENT)
Dept: ONCOLOGY | Facility: HOSPITAL | Age: 77
Setting detail: INFUSION SERIES
Discharge: HOME | End: 2022-06-01

## 2022-06-01 VITALS
DIASTOLIC BLOOD PRESSURE: 58 MMHG | RESPIRATION RATE: 18 BRPM | BODY MASS INDEX: 27.46 KG/M2 | SYSTOLIC BLOOD PRESSURE: 108 MMHG | HEIGHT: 63 IN | TEMPERATURE: 97 F | HEART RATE: 63 BPM | WEIGHT: 155 LBS

## 2022-06-01 DIAGNOSIS — C50.411 MALIGNANT NEOPLASM OF UPPER-OUTER QUADRANT OF BOTH BREASTS IN FEMALE, ESTROGEN RECEPTOR POSITIVE: ICD-10-CM

## 2022-06-01 DIAGNOSIS — Z17.0 MALIGNANT NEOPLASM OF UPPER-OUTER QUADRANT OF BOTH BREASTS IN FEMALE, ESTROGEN RECEPTOR POSITIVE: ICD-10-CM

## 2022-06-01 DIAGNOSIS — C50.412 MALIGNANT NEOPLASM OF UPPER-OUTER QUADRANT OF BOTH BREASTS IN FEMALE, ESTROGEN RECEPTOR POSITIVE: ICD-10-CM

## 2022-06-01 DIAGNOSIS — R91.8 MASS OF LEFT LUNG: ICD-10-CM

## 2022-06-01 DIAGNOSIS — C54.1 ENDOMETRIAL CANCER: Primary | ICD-10-CM

## 2022-06-01 LAB
ALBUMIN SERPL-MCNC: 3.6 G/DL (ref 3.5–5.2)
ALBUMIN/GLOB SERPL: 1.3 G/DL
ALP SERPL-CCNC: 48 U/L (ref 39–117)
ALT SERPL W P-5'-P-CCNC: 17 U/L (ref 1–33)
ANION GAP SERPL CALCULATED.3IONS-SCNC: 8 MMOL/L (ref 5–15)
AST SERPL-CCNC: 16 U/L (ref 1–32)
BILIRUB SERPL-MCNC: 0.7 MG/DL (ref 0–1.2)
BUN SERPL-MCNC: 19 MG/DL (ref 8–23)
BUN/CREAT SERPL: 27.1 (ref 7–25)
CALCIUM SPEC-SCNC: 8.7 MG/DL (ref 8.6–10.5)
CHLORIDE SERPL-SCNC: 98 MMOL/L (ref 98–107)
CO2 SERPL-SCNC: 25 MMOL/L (ref 22–29)
CREAT SERPL-MCNC: 0.7 MG/DL (ref 0.57–1)
EGFRCR SERPLBLD CKD-EPI 2021: 90.3 ML/MIN/1.73
ERYTHROCYTE [DISTWIDTH] IN BLOOD BY AUTOMATED COUNT: 14.9 % (ref 12.3–15.4)
GLOBULIN UR ELPH-MCNC: 2.8 GM/DL
GLUCOSE SERPL-MCNC: 126 MG/DL (ref 65–99)
HCT VFR BLD AUTO: 28.8 % (ref 34–46.6)
HGB BLD-MCNC: 9.5 G/DL (ref 12–15.9)
LYMPHOCYTES # BLD AUTO: 0.8 10*3/MM3 (ref 0.7–3.1)
LYMPHOCYTES NFR BLD AUTO: 35.4 % (ref 19.6–45.3)
MCH RBC QN AUTO: 29.9 PG (ref 26.6–33)
MCHC RBC AUTO-ENTMCNC: 32.9 G/DL (ref 31.5–35.7)
MCV RBC AUTO: 90.9 FL (ref 79–97)
MONOCYTES # BLD AUTO: 0.1 10*3/MM3 (ref 0.1–0.9)
MONOCYTES NFR BLD AUTO: 3.8 % (ref 5–12)
NEUTROPHILS NFR BLD AUTO: 1.5 10*3/MM3 (ref 1.7–7)
NEUTROPHILS NFR BLD AUTO: 60.8 % (ref 42.7–76)
PLATELET # BLD AUTO: 76 10*3/MM3 (ref 140–450)
PMV BLD AUTO: 6.2 FL (ref 6–12)
POTASSIUM SERPL-SCNC: 4.5 MMOL/L (ref 3.5–5.2)
PROT SERPL-MCNC: 6.4 G/DL (ref 6–8.5)
RBC # BLD AUTO: 3.17 10*6/MM3 (ref 3.77–5.28)
SODIUM SERPL-SCNC: 131 MMOL/L (ref 136–145)
WBC NRBC COR # BLD: 2.4 10*3/MM3 (ref 3.4–10.8)

## 2022-06-01 PROCEDURE — 25010000002 PALONOSETRON 0.25 MG/5ML SOLUTION PREFILLED SYRINGE: Performed by: INTERNAL MEDICINE

## 2022-06-01 PROCEDURE — 96413 CHEMO IV INFUSION 1 HR: CPT

## 2022-06-01 PROCEDURE — 25010000002 HEPARIN LOCK FLUSH PER 10 UNITS: Performed by: INTERNAL MEDICINE

## 2022-06-01 PROCEDURE — 96375 TX/PRO/DX INJ NEW DRUG ADDON: CPT

## 2022-06-01 PROCEDURE — 96417 CHEMO IV INFUS EACH ADDL SEQ: CPT

## 2022-06-01 PROCEDURE — 80053 COMPREHEN METABOLIC PANEL: CPT | Performed by: INTERNAL MEDICINE

## 2022-06-01 PROCEDURE — 25010000002 DEXAMETHASONE SODIUM PHOSPHATE 100 MG/10ML SOLUTION: Performed by: INTERNAL MEDICINE

## 2022-06-01 PROCEDURE — 25010000002 PACLITAXEL PER 1 MG: Performed by: INTERNAL MEDICINE

## 2022-06-01 PROCEDURE — 25010000002 CARBOPLATIN PER 50 MG: Performed by: INTERNAL MEDICINE

## 2022-06-01 PROCEDURE — 25010000002 DIPHENHYDRAMINE PER 50 MG: Performed by: INTERNAL MEDICINE

## 2022-06-01 PROCEDURE — 85025 COMPLETE CBC W/AUTO DIFF WBC: CPT | Performed by: INTERNAL MEDICINE

## 2022-06-01 RX ORDER — SODIUM CHLORIDE 9 MG/ML
250 INJECTION, SOLUTION INTRAVENOUS ONCE
Status: COMPLETED | OUTPATIENT
Start: 2022-06-01 | End: 2022-06-01

## 2022-06-01 RX ORDER — FAMOTIDINE 20 MG/1
20 TABLET, FILM COATED ORAL ONCE
Status: COMPLETED | OUTPATIENT
Start: 2022-06-01 | End: 2022-06-01

## 2022-06-01 RX ORDER — PALONOSETRON 0.05 MG/ML
0.25 INJECTION, SOLUTION INTRAVENOUS ONCE
Status: COMPLETED | OUTPATIENT
Start: 2022-06-01 | End: 2022-06-01

## 2022-06-01 RX ORDER — SODIUM CHLORIDE 0.9 % (FLUSH) 0.9 %
10 SYRINGE (ML) INJECTION AS NEEDED
Status: CANCELLED | OUTPATIENT
Start: 2022-06-01

## 2022-06-01 RX ORDER — HEPARIN SODIUM (PORCINE) LOCK FLUSH IV SOLN 100 UNIT/ML 100 UNIT/ML
500 SOLUTION INTRAVENOUS AS NEEDED
Status: CANCELLED | OUTPATIENT
Start: 2022-06-01

## 2022-06-01 RX ORDER — HEPARIN SODIUM (PORCINE) LOCK FLUSH IV SOLN 100 UNIT/ML 100 UNIT/ML
500 SOLUTION INTRAVENOUS AS NEEDED
Status: DISCONTINUED | OUTPATIENT
Start: 2022-06-01 | End: 2022-06-02 | Stop reason: HOSPADM

## 2022-06-01 RX ADMIN — PACLITAXEL 105 MG: 6 INJECTION, SOLUTION INTRAVENOUS at 12:27

## 2022-06-01 RX ADMIN — PALONOSETRON 0.25 MG: 0.25 INJECTION, SOLUTION INTRAVENOUS at 11:26

## 2022-06-01 RX ADMIN — DEXAMETHASONE SODIUM PHOSPHATE 12 MG: 10 INJECTION, SOLUTION INTRAMUSCULAR; INTRAVENOUS at 11:29

## 2022-06-01 RX ADMIN — SODIUM CHLORIDE 25 MG: 9 INJECTION, SOLUTION INTRAVENOUS at 11:29

## 2022-06-01 RX ADMIN — CARBOPLATIN 200 MG: 10 INJECTION, SOLUTION INTRAVENOUS at 13:34

## 2022-06-01 RX ADMIN — HEPARIN 500 UNITS: 100 SYRINGE at 14:08

## 2022-06-01 RX ADMIN — FAMOTIDINE 20 MG: 20 TABLET ORAL at 11:25

## 2022-06-01 RX ADMIN — SODIUM CHLORIDE 250 ML: 9 INJECTION, SOLUTION INTRAVENOUS at 11:25

## 2022-06-07 ENCOUNTER — HOSPITAL ENCOUNTER (OUTPATIENT)
Dept: RADIATION ONCOLOGY | Facility: HOSPITAL | Age: 77
Setting detail: RADIATION/ONCOLOGY SERIES
Discharge: HOME | End: 2022-06-07

## 2022-06-07 ENCOUNTER — OFFICE VISIT (OUTPATIENT)
Dept: RADIATION ONCOLOGY | Facility: HOSPITAL | Age: 77
End: 2022-06-07

## 2022-06-07 DIAGNOSIS — C78.02 MALIGNANT NEOPLASM METASTATIC TO LEFT LUNG: Primary | ICD-10-CM

## 2022-06-07 NOTE — PROGRESS NOTES
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  Total time of discussion was 14 minutes.  The patient has given verbal consent.    COVID-19 RISK SCREEN     1. Has the patient had close contact or exposure without PPE with a lab confirmed COVID-19 (+) person or a person under investigation (PUI) for COVID-19 infection?  -- No   2. Has the patient had respiratory symptoms, worsened/new cough and/or SOA, unexplained fever, or sudden loss of smell and/or taste in the past week? --  No  3. Has the patient completed COVID vaccination? --  Yes         BRIEF HISTORY:   Kenisha Jama is a 76 y.o. female with triple negative right-sided breast cancer, as well as ER positive breast cancer on the left status post bilateral lumpectomies with Dr. Saumya Reed.  Additionally, she was found to have a single metastatic focus located along the fissure at the left lung base, pathologically consistent with metastatic disease from her known endometrial cancer.  Her case was discussed at tumor board.  The patient underwent a course of CyberKnife stereotactic body radiotherapy consisting of 25 Gray in a single fraction delivered to the single focus of disease in her left lung.  She tolerated treatment well.  She did not develop significant fatigue.  She denies dyspnea, cough, hemoptysis, chest pain, esophagitis symptoms, fever, chills, or  weight loss.  Since completing SBRT, she has started systemic chemotherapy adjuvantly with carboplatin and Taxol for both triple negative breast cancer and endometrial cancer.  This is planned for a total of 6 cycles per Dr. Lieberman.  She believes she is tolerating this well.  She notes a few episodes of diarrhea managed with imodium and no complaints otherwise.          MEDICATIONS: Medication reconciliation for the patient was reviewed and confirmed in the electronic medical record.    Review of Systems   Gastrointestinal: Positive for diarrhea.   All other systems reviewed and are negative.          KPS 80%      Physical Exam  Pulmonary:      Respirations even, unlabored. No audible wheezing or cough.  Neurological:      A+Ox4, conversant, answers questions appropriately.  Psychiatric:     Judgement, affect, and decision-making WNL.    Limited physical exam as visit was conducted remotely via telephone in light of the COVID-19 pandemic.        The following portions of the patient's history were reviewed and updated as appropriate: allergies, current medications, past family history, past medical history, past social history, past surgical history and problem list.         Diagnoses and all orders for this visit:    1. Malignant neoplasm metastatic to left lung (HCC) (Primary)         IMPRESSION:  Kenisha Jama is a 76 y.o. female with stage IV endometrial cancer, found to have a solitary left lower lobe lung nodule pathologically consistent with oligometasis from the endometrium.  She is now 1 month status post CyberKnife SBRT to this single focus of disease in the lung.  She tolerated treatment well.  She did not develop significant acute radiation related toxicities.  She has since been since started on systemic chemotherapy per Dr. Lieberman.  This is planned for a total of 6 cycles in the adjuvant setting as the patient also has bilateral breast cancers status post bilateral lumpectomies.  Pathology demonstrated triple  negative breast cancer on the right and ER positive breast cancer on the left.  The patient will benefit from a postoperative course of radiotherapy to the bilateral breasts in an effort to reduce the risk of a local recurrence.  It has been recommended that she complete her chemotherapy first, prior to proceeding with adjuvant radiotherapy.  Additionally, I anticipate that she will be placed on endocrine therapy with an aromatase inhibitor once chemotherapy is complete given ER positivity of her left breast and lung metastasis from her endometrial cancer.  We will defer to medical oncology for coordination of repeat imaging studies.  The patient I have reviewed anticipated next steps as well as expectations for response to treatment.        RECOMMENDATIONS:   Once chemotherapy is complete, recommend coordination from the medical oncology clinic back to the radiation department for consultation with Dr. Haley to discuss adjuvant postoperative radiotherapy for her bilateral breast cancers.    I spent a total of 30 minutes on today's visit, with more than 14 minutes in virtual communication with the patient via telephone, and the remainder of the time spent in reviewing the relevant history, records, available imaging, and for documentation.        Vidhya Winn, APRN

## 2022-06-08 ENCOUNTER — HOSPITAL ENCOUNTER (OUTPATIENT)
Dept: ONCOLOGY | Facility: HOSPITAL | Age: 77
Setting detail: INFUSION SERIES
Discharge: HOME | End: 2022-06-08

## 2022-06-08 ENCOUNTER — HOSPITAL ENCOUNTER (OUTPATIENT)
Dept: ONCOLOGY | Facility: HOSPITAL | Age: 77
Discharge: HOME | End: 2022-06-08

## 2022-06-08 ENCOUNTER — TELEPHONE (OUTPATIENT)
Dept: ONCOLOGY | Facility: CLINIC | Age: 77
End: 2022-06-08

## 2022-06-08 ENCOUNTER — OFFICE VISIT (OUTPATIENT)
Dept: ONCOLOGY | Facility: CLINIC | Age: 77
End: 2022-06-08

## 2022-06-08 VITALS
HEIGHT: 63 IN | TEMPERATURE: 96.9 F | OXYGEN SATURATION: 97 % | HEART RATE: 79 BPM | BODY MASS INDEX: 27.64 KG/M2 | RESPIRATION RATE: 16 BRPM | WEIGHT: 156 LBS | DIASTOLIC BLOOD PRESSURE: 61 MMHG | SYSTOLIC BLOOD PRESSURE: 99 MMHG

## 2022-06-08 VITALS — DIASTOLIC BLOOD PRESSURE: 53 MMHG | HEART RATE: 63 BPM | SYSTOLIC BLOOD PRESSURE: 113 MMHG

## 2022-06-08 DIAGNOSIS — Z17.0 MALIGNANT NEOPLASM OF UPPER-OUTER QUADRANT OF BOTH BREASTS IN FEMALE, ESTROGEN RECEPTOR POSITIVE: ICD-10-CM

## 2022-06-08 DIAGNOSIS — C54.1 ENDOMETRIAL CANCER: Primary | ICD-10-CM

## 2022-06-08 DIAGNOSIS — C50.411 MALIGNANT NEOPLASM OF UPPER-OUTER QUADRANT OF BOTH BREASTS IN FEMALE, ESTROGEN RECEPTOR POSITIVE: ICD-10-CM

## 2022-06-08 DIAGNOSIS — C50.412 MALIGNANT NEOPLASM OF UPPER-OUTER QUADRANT OF BOTH BREASTS IN FEMALE, ESTROGEN RECEPTOR POSITIVE: ICD-10-CM

## 2022-06-08 DIAGNOSIS — R91.8 MASS OF LEFT LUNG: ICD-10-CM

## 2022-06-08 LAB
ALBUMIN SERPL-MCNC: 3.9 G/DL (ref 3.5–5.2)
ALBUMIN/GLOB SERPL: 1.4 G/DL
ALP SERPL-CCNC: 52 U/L (ref 39–117)
ALT SERPL W P-5'-P-CCNC: 20 U/L (ref 1–33)
ANION GAP SERPL CALCULATED.3IONS-SCNC: 7 MMOL/L (ref 5–15)
AST SERPL-CCNC: 18 U/L (ref 1–32)
BILIRUB SERPL-MCNC: 0.8 MG/DL (ref 0–1.2)
BUN SERPL-MCNC: 23 MG/DL (ref 8–23)
BUN/CREAT SERPL: 27.4 (ref 7–25)
CALCIUM SPEC-SCNC: 9 MG/DL (ref 8.6–10.5)
CHLORIDE SERPL-SCNC: 95 MMOL/L (ref 98–107)
CO2 SERPL-SCNC: 24 MMOL/L (ref 22–29)
CREAT SERPL-MCNC: 0.84 MG/DL (ref 0.57–1)
EGFRCR SERPLBLD CKD-EPI 2021: 72.6 ML/MIN/1.73
ERYTHROCYTE [DISTWIDTH] IN BLOOD BY AUTOMATED COUNT: 14.3 % (ref 12.3–15.4)
GLOBULIN UR ELPH-MCNC: 2.8 GM/DL
GLUCOSE SERPL-MCNC: 118 MG/DL (ref 65–99)
HCT VFR BLD AUTO: 28.2 % (ref 34–46.6)
HGB BLD-MCNC: 9.1 G/DL (ref 12–15.9)
LYMPHOCYTES # BLD AUTO: 0.9 10*3/MM3 (ref 0.7–3.1)
LYMPHOCYTES NFR BLD AUTO: 49.3 % (ref 19.6–45.3)
MCH RBC QN AUTO: 29.6 PG (ref 26.6–33)
MCHC RBC AUTO-ENTMCNC: 32.3 G/DL (ref 31.5–35.7)
MCV RBC AUTO: 91.4 FL (ref 79–97)
MONOCYTES # BLD AUTO: 0.1 10*3/MM3 (ref 0.1–0.9)
MONOCYTES NFR BLD AUTO: 3.7 % (ref 5–12)
NEUTROPHILS NFR BLD AUTO: 0.9 10*3/MM3 (ref 1.7–7)
NEUTROPHILS NFR BLD AUTO: 47 % (ref 42.7–76)
PLATELET # BLD AUTO: 74 10*3/MM3 (ref 140–450)
PMV BLD AUTO: 6.3 FL (ref 6–12)
POTASSIUM SERPL-SCNC: 4.7 MMOL/L (ref 3.5–5.2)
PROT SERPL-MCNC: 6.7 G/DL (ref 6–8.5)
RBC # BLD AUTO: 3.09 10*6/MM3 (ref 3.77–5.28)
SODIUM SERPL-SCNC: 126 MMOL/L (ref 136–145)
WBC NRBC COR # BLD: 1.9 10*3/MM3 (ref 3.4–10.8)

## 2022-06-08 PROCEDURE — 25010000002 DIPHENHYDRAMINE PER 50 MG: Performed by: INTERNAL MEDICINE

## 2022-06-08 PROCEDURE — 25010000002 PALONOSETRON 0.25 MG/5ML SOLUTION PREFILLED SYRINGE: Performed by: INTERNAL MEDICINE

## 2022-06-08 PROCEDURE — 99214 OFFICE O/P EST MOD 30 MIN: CPT | Performed by: INTERNAL MEDICINE

## 2022-06-08 PROCEDURE — 25010000002 PACLITAXEL PER 1 MG: Performed by: INTERNAL MEDICINE

## 2022-06-08 PROCEDURE — 96417 CHEMO IV INFUS EACH ADDL SEQ: CPT

## 2022-06-08 PROCEDURE — 96375 TX/PRO/DX INJ NEW DRUG ADDON: CPT

## 2022-06-08 PROCEDURE — 85025 COMPLETE CBC W/AUTO DIFF WBC: CPT | Performed by: INTERNAL MEDICINE

## 2022-06-08 PROCEDURE — 96413 CHEMO IV INFUSION 1 HR: CPT

## 2022-06-08 PROCEDURE — 25010000002 DEXAMETHASONE SODIUM PHOSPHATE 100 MG/10ML SOLUTION: Performed by: INTERNAL MEDICINE

## 2022-06-08 PROCEDURE — 80053 COMPREHEN METABOLIC PANEL: CPT | Performed by: INTERNAL MEDICINE

## 2022-06-08 PROCEDURE — 25010000002 HEPARIN LOCK FLUSH PER 10 UNITS: Performed by: INTERNAL MEDICINE

## 2022-06-08 PROCEDURE — 25010000002 CARBOPLATIN PER 50 MG: Performed by: INTERNAL MEDICINE

## 2022-06-08 RX ORDER — PALONOSETRON 0.05 MG/ML
0.25 INJECTION, SOLUTION INTRAVENOUS ONCE
Status: COMPLETED | OUTPATIENT
Start: 2022-06-08 | End: 2022-06-08

## 2022-06-08 RX ORDER — KETOROLAC TROMETHAMINE 5 MG/ML
SOLUTION OPHTHALMIC
COMMUNITY
Start: 2022-05-31

## 2022-06-08 RX ORDER — HEPARIN SODIUM (PORCINE) LOCK FLUSH IV SOLN 100 UNIT/ML 100 UNIT/ML
500 SOLUTION INTRAVENOUS AS NEEDED
Status: CANCELLED | OUTPATIENT
Start: 2022-06-08

## 2022-06-08 RX ORDER — FAMOTIDINE 20 MG/1
20 TABLET, FILM COATED ORAL ONCE
Status: COMPLETED | OUTPATIENT
Start: 2022-06-08 | End: 2022-06-08

## 2022-06-08 RX ORDER — ATORVASTATIN CALCIUM 20 MG/1
20 TABLET, FILM COATED ORAL EVERY EVENING
COMMUNITY
Start: 2022-05-27

## 2022-06-08 RX ORDER — HEPARIN SODIUM (PORCINE) LOCK FLUSH IV SOLN 100 UNIT/ML 100 UNIT/ML
500 SOLUTION INTRAVENOUS AS NEEDED
Status: DISCONTINUED | OUTPATIENT
Start: 2022-06-08 | End: 2022-06-09 | Stop reason: HOSPADM

## 2022-06-08 RX ORDER — SODIUM CHLORIDE 9 MG/ML
250 INJECTION, SOLUTION INTRAVENOUS ONCE
Status: COMPLETED | OUTPATIENT
Start: 2022-06-08 | End: 2022-06-08

## 2022-06-08 RX ORDER — SODIUM CHLORIDE 0.9 % (FLUSH) 0.9 %
10 SYRINGE (ML) INJECTION AS NEEDED
Status: CANCELLED | OUTPATIENT
Start: 2022-06-08

## 2022-06-08 RX ADMIN — DEXAMETHASONE SODIUM PHOSPHATE 12 MG: 10 INJECTION, SOLUTION INTRAMUSCULAR; INTRAVENOUS at 13:00

## 2022-06-08 RX ADMIN — SODIUM CHLORIDE 250 ML: 9 INJECTION, SOLUTION INTRAVENOUS at 12:54

## 2022-06-08 RX ADMIN — FAMOTIDINE 20 MG: 20 TABLET ORAL at 12:57

## 2022-06-08 RX ADMIN — DIPHENHYDRAMINE HYDROCHLORIDE 25 MG: 50 INJECTION INTRAMUSCULAR; INTRAVENOUS at 13:00

## 2022-06-08 RX ADMIN — PACLITAXEL 105 MG: 6 INJECTION, SOLUTION INTRAVENOUS at 13:47

## 2022-06-08 RX ADMIN — HEPARIN 500 UNITS: 100 SYRINGE at 15:30

## 2022-06-08 RX ADMIN — PALONOSETRON 0.25 MG: 0.25 INJECTION, SOLUTION INTRAVENOUS at 12:58

## 2022-06-08 RX ADMIN — CARBOPLATIN 180 MG: 10 INJECTION, SOLUTION INTRAVENOUS at 14:54

## 2022-06-08 NOTE — TELEPHONE ENCOUNTER
----- Message from Anastasiia Marr RN sent at 6/8/2022 10:45 AM EDT -----      Critical Test Results      MD: Mio    Date: 6/8/22     Critical test result:  WBC 1.9, ANC 0.9    Time results received:  10:45

## 2022-06-08 NOTE — PROGRESS NOTES
PROBLEM LIST:  Oncology/Hematology History   Endometrial cancer (HCC)   9/30/2011 Imaging    TVUS and CT scan for palpable left pelvic mass upon annual exam and new abdominal symptoms.      10/6/2011 Surgery    ADY/BSO with pelvic lymph node dissection. Stage 1A grade 2 endometrial adenocarcinoma by final pathology     1/26/2022 Imaging    IMPRESSION:     Hypermetabolic soft tissue nodule in the left breast compatible with  biopsy-proven invasive ductal carcinoma. There is diffuse low level FDG  uptake in the region of recent right breast biopsy site. A  hypermetabolic mass in the left lower lobe is suspicious for pulmonary  metastasis. No additional sites of metastases are identified. There is  no evidence of discrete/focal hypermetabolic lesion of the sternum to  correspond with the indeterminant sternal lesion described on breast MRI  exam.     2/14/2022 Biopsy    Final Diagnosis   LEFT LUNG, BIOPSY:  Metastatic adenocarcinoma.  See comment.  GJK   Electronically signed by Wolfgang Hair MD on 2/22/2022 at 1154   Comment    Sections show small fragments of tumor that are morphologically distinct from the patient's known breast cancers.  Immunoprofile suggests mullerian origin.  Clinical correlation required.  This case was shown for intradepartmental consultation and the other pathologist concurs with the findings interpretation.  This case was discussed with Dr. Trevino on 02/21/22.  This case was discussed with Dr. Reed on 02/22/22.        5/4/2022 -  Chemotherapy    OP GYN PACLitaxel / CARBOplatin AUC=2 (Weekly)     5/4/2022 -  Chemotherapy    OP CENTRAL VENOUS ACCESS DEVICE ACCESS, CARE, AND MAINTENANCE ([facility])     Malignant neoplasm of upper-outer quadrant of both breasts in female, estrogen receptor positive (HCC)   1/21/2022 Initial Diagnosis    Malignant neoplasm of upper-outer quadrant of both breasts in female, estrogen receptor positive (HCC)     1/24/2022 Biopsy    1.  Right breast cancer-invasive  ductal carcinoma grade 2-ER negative MA negative HER-2 negative    2.  Left breast cancer-invasive ductal carcinoma grade 2-ER positive MA negative HER-2 negative     1/26/2022 Imaging    EXAMINATION: NM PET/CT SKULL BASE TO MID THIGH      FINDINGS:      Today's 3-D images demonstrate focal hypermetabolism in the soft tissues  of the left breast as well as focal hypermetabolism within the left  midlung.     Multiplanar images of the head and neck demonstrate symmetric FDG uptake  within the brain. There is no evidence of hypermetabolic neck mass or  lymph node.     In the upper outer quadrant of the left breast there is redemonstration  of a irregular 2.1 cm soft tissue nodule with FDG hypermetabolism of SUV  max 4.2, compatible with biopsy-proven invasive ductal carcinoma. There  is an ill-defined diffuse region of low-level hypermetabolism in the  right breast with adjacent biopsy clip and single locule of soft tissue  air, compatible with recent right breast biopsy. A discrete  hypermetabolic nodule or mass in the right breast is not confidently  identified. There is no hypermetabolic or enlarged axillary lymph nodes.     Within the chest there is redemonstration of a lobulated soft tissue  mass in the superior aspect of the left lower lobe which appears  hypermetabolic with SUV max 6.5. There is no evidence of hypermetabolic  mediastinal or hilar adenopathy.     Below the diaphragm there is expected physiologic uptake within the   and GI tracts. No evidence of abdominal pelvic malignancy or metastasis.  No hypermetabolic abdominopelvic lymph nodes. Photopenic left renal cyst  is noted.     There is no hypermetabolic pathologic marrow process. Specifically,  there is no evidence of hypermetabolic lesion of the sternum to  correspond with the indeterminant sternal lesion detailed on prior  breast MRI exam. FDG uptake at the left antecubital fossa reflects site  of IV administration.     IMPRESSION:      Hypermetabolic soft tissue nodule in the left breast compatible with  biopsy-proven invasive ductal carcinoma. There is diffuse low level FDG  uptake in the region of recent right breast biopsy site. A  hypermetabolic mass in the left lower lobe is suspicious for pulmonary  metastasis. No additional sites of metastases are identified. There is  no evidence of discrete/focal hypermetabolic lesion of the sternum to  correspond with the indeterminant sternal lesion described on breast MRI  exam.           4/20/2022 Cancer Staged    Staging form: Breast, AJCC 8th Edition  - Pathologic: Stage IB (pT1c, pN0, cM0, G3, ER-, NE-, HER2-) - Signed by Ruby Trevino MD on 4/20/2022 5/4/2022 -  Chemotherapy    OP CENTRAL VENOUS ACCESS DEVICE ACCESS, CARE, AND MAINTENANCE ([facility])     Malignant neoplasm metastatic to left lung (HCC) (Resolved)   3/23/2022 Initial Diagnosis    Malignant neoplasm metastatic to left lung (HCC) (Resolved)     4/26/2022 - 4/26/2022 Radiation    Radiation OncologyTreatment Course:  Kenisha Jama received 2500 cGy in 1 fraction to left lung metastasis via Stereotactic Radiation Therapy - SRT.         REASON FOR VISIT: Management of my cancers     HISTORY OF PRESENT ILLNESS:   76 y.o.  female presents today for follow-up.  She is currently on carboplatin and Taxol weekly.  We are treating her adjuvantly for both triple negative breast cancer and endometrial cancer.  She had CyberKnife to the endometrial cancer focus in the lung.  She is tolerating her treatment reasonably well.  No significant issues with side effects.  No recent infections.  No significant neuropathy.  She is becoming more fatigued.  Her platelets are also getting fairly affected.    Past medical history, social history and family history was reviewed 06/08/22 and unchanged from prior visit.    Review of Systems:    Review of Systems   Constitutional: Positive for fatigue.   HENT:  Negative.    Eyes: Negative.     Respiratory: Negative.    Cardiovascular: Negative.    Gastrointestinal: Negative.    Endocrine: Negative.    Genitourinary: Negative.     Musculoskeletal: Negative.    Skin: Negative.    Neurological: Negative.    Hematological: Negative.    Psychiatric/Behavioral: Negative.             Medications:        Current Outpatient Medications:   •  acetaminophen (Tylenol 8 Hour) 650 MG 8 hr tablet, Take 1 tablet by mouth Every 8 (Eight) Hours As Needed for Mild Pain ., Disp: 40 tablet, Rfl: 0  •  atorvastatin (LIPITOR) 20 MG tablet, Take 20 mg by mouth Every Evening., Disp: , Rfl:   •  carvedilol (COREG) 12.5 MG tablet, Take 12.5 mg by mouth 2 (Two) Times a Day With Meals., Disp: , Rfl:   •  ezetimibe (ZETIA) 10 MG tablet, Take 10 mg by mouth Every Night., Disp: , Rfl:   •  ferrous sulfate 325 (65 FE) MG tablet, Take 325 mg by mouth Daily With Breakfast., Disp: , Rfl:   •  ibuprofen (ADVIL,MOTRIN) 600 MG tablet, Take 1 tablet by mouth Every 6 (Six) Hours As Needed for Mild Pain ., Disp: 15 tablet, Rfl: 0  •  ketorolac (ACULAR) 0.5 % ophthalmic solution, , Disp: , Rfl:   •  lidocaine-prilocaine (EMLA) 2.5-2.5 % cream, Apply 1 application topically to the appropriate area as directed As Needed for Injection Site Pain. Apply 45-60 minutes before port access, cover with plastic wrap after application., Disp: 5 g, Rfl: 5  •  lisinopril (PRINIVIL,ZESTRIL) 40 MG tablet, Take 40 mg by mouth Daily., Disp: , Rfl:   •  NIFEdipine XL (PROCARDIA XL) 30 MG 24 hr tablet, Take 30 mg by mouth Daily., Disp: , Rfl:   •  ondansetron (ZOFRAN) 8 MG tablet, Take 1 tablet by mouth 3 (Three) Times a Day As Needed for Nausea or Vomiting., Disp: 30 tablet, Rfl: 5  •  pantoprazole (PROTONIX) 40 MG EC tablet, , Disp: , Rfl:   •  pravastatin (PRAVACHOL) 80 MG tablet, , Disp: , Rfl: 1  •  ursodiol (ACTIGALL) 500 MG tablet, , Disp: , Rfl:     Current Facility-Administered Medications:   •  lidocaine (XYLOCAINE) 1 % injection 5 mL, 5 mL, Infiltration,  "Once, Svetlana Juárez MD    Facility-Administered Medications Ordered in Other Visits:   •  heparin injection 500 Units, 500 Units, Intravenous, PRN, Ruby Trevino MD, 500 Units at 06/08/22 1530    Pain Medications             acetaminophen (Tylenol 8 Hour) 650 MG 8 hr tablet Take 1 tablet by mouth Every 8 (Eight) Hours As Needed for Mild Pain .    ibuprofen (ADVIL,MOTRIN) 600 MG tablet Take 1 tablet by mouth Every 6 (Six) Hours As Needed for Mild Pain .             ALLERGIES:  No Known Allergies      Physical Exam    VITAL SIGNS:  BP 99/61   Pulse 79   Temp 96.9 °F (36.1 °C) (Temporal)   Resp 16   Ht 160 cm (62.99\")   Wt 70.8 kg (156 lb)   SpO2 97%   BMI 27.64 kg/m²     ECOG score: 0           Wt Readings from Last 3 Encounters:   06/08/22 70.8 kg (156 lb)   06/01/22 70.3 kg (155 lb)   05/26/22 71.6 kg (157 lb 14.4 oz)       Body mass index is 27.64 kg/m². Body surface area is 1.74 meters squared.    Pain Score    06/08/22 1041   PainSc: 0-No pain           Performance Status: 0    General: well appearing, in no acute distress  HEENT: sclera anicteric, neck is supple  Lymphatics: no cervical, supraclavicular, or axillary adenopathy  Cardiovascular: regular rate and rhythm, no murmurs, rubs or gallops  Lungs: clear to auscultation bilaterally  Abdomen: soft, nontender, nondistended.  No palpable organomegaly  Extremities: no lower extremity edema  Skin: no rashes, lesions, bruising, or petechiae  Msk:  Shows no weakness of the large muscle groups  Psych: Mood is stable        RECENT LABS:    Lab Results   Component Value Date    HGB 9.1 (L) 06/08/2022    HCT 28.2 (L) 06/08/2022    MCV 91.4 06/08/2022    PLT 74 (L) 06/08/2022    WBC 1.90 (C) 06/08/2022    NEUTROABS 0.90 (L) 06/08/2022    LYMPHSABS 0.90 06/08/2022    MONOSABS 0.10 06/08/2022    EOSABS 0.09 02/14/2022    BASOSABS 0.04 02/14/2022       Lab Results   Component Value Date    GLUCOSE 118 (H) 06/08/2022    BUN 23 06/08/2022    CREATININE " 0.84 06/08/2022     (L) 06/08/2022    K 4.7 06/08/2022    CL 95 (L) 06/08/2022    CO2 24.0 06/08/2022    CALCIUM 9.0 06/08/2022    PROTEINTOT 6.7 06/08/2022    ALBUMIN 3.90 06/08/2022    BILITOT 0.8 06/08/2022    ALKPHOS 52 06/08/2022    AST 18 06/08/2022    ALT 20 06/08/2022       CT Chest With Contrast Diagnostic    Result Date: 1/10/2022  CT demonstrates no specific osseous correlate to the sternal lesion noted on recent MRI. No corresponding lytic or sclerotic osseous lesion is present. There is no evidence of cortical breakthrough.  An irregular lobulated homogeneous 3.7 x 2.1 cm nodule is noted along the fissure the left lung base. Findings remain suspicious for metastatic involvement, however granulomatous process or possibly pulmonary AVM could have similar appearance. PET/CT would be useful to characterize but this finding and further evaluate for possible CT occult sternal osseous metastasis.  Redemonstration of bilateral soft tissue breast masses concerning for malignancy, better characterized on recent MRI.  This report was finalized on 1/10/2022 3:34 PM by Rodríguez Bacon.      IR Fluoro Guidance Only for Central Line    Result Date: 3/16/2022  10 seconds intraoperative fluoroscopy time during port placement performed by Dr. Reed. Details the procedure can be found in Dr. Reed's note.  This report was finalized on 3/16/2022 2:10 PM by Shemar Ulrich MD.      XR Chest 1 View    Result Date: 3/16/2022  No pneumothorax status post port placement  Atelectasis in the lower left lung  This report was finalized on 3/16/2022 1:22 PM by Pepito Leblanc.      XR Chest 1 View    Result Date: 2/14/2022  No pneumothorax.  This report was finalized on 2/14/2022 2:29 PM by Rodríguez Bacon.      XR Chest 1 View    Result Date: 2/14/2022  Left lower lobe pulmonary nodule faintly seen. There is no pneumothorax or other evident immediate complication. Unchanged heart and mediastinal contours.  This report was  "finalized on 2/14/2022 1:06 PM by Rodríguez Bacon.      CT Needle Biopsy Lung    Result Date: 2/16/2022  Impression:                                                              Successful CT guided core biopsy of a mass in the left lobe of the lung as described above.  Thank you for the opportunity to assist in the care of your patient.  This report was finalized on 2/16/2022 8:39 AM by Geovani Wilkins MD.      Mammo Post Clip Placement Right    Result Date: 1/27/2022  Findings highly suggestive of malignancy at the 11:00 position of the right breast both mammographically and sonographically.   BI-RADS CATEGORY:  5, HIGHLY SUGGESTIVE OF MALIGNANCY   RECOMMENDATION:  Ultrasound-guided biopsy  CAD was utilized.     10G ELEVATION VACUUM-ASSISTED ULTRASOUND GUIDED CORE BIOPSY    History: Findings highly suggestive of malignancy at the right 11-11 30 position  Procedure: Written and verbal consent was obtained for ultrasound guided core biopsy of a 2 to 3 cm centimeter ill-defined spiculated area corresponding to MRI nonmass enhancement at the right 11-11 30 position \" Time out\" was observed to verify the patient's identity and correct location of the breast abnormality. The presence of the lesion was confirmed ultrasound and a lateral approach was chosen. The breast was prepped and draped in the usual sterile fashion and 10 mL 1% lidocaine with and without epinephrine was utilized for local anesthesia. A small skin incision was made with a scalpel and a 10-gauge needle with an overlying sheath attached to the core biopsy device was introduced into the breast under direct sonographic guidance. The needle was placed within to the mass. A total of 6 core samples were obtained. The specimens were placed in formalin and forwarded to the pathology department. A post biopsy marking clip was placed. Post biopsy mammographic images were obtained. The clip was noted to be in excellent position in the anterior aspect of the 2 " adjacent areas of nonmass enhancement seen by MRI  Upon completion of the procedure, compression was applied to the biopsy site until all appreciable bleeding subsided and a sterile dressing was applied. Post biopsy instructions were reviewed with the patient by our clinical breast imaging staff. A written copy of these instructions was also given to the patient. The patient tolerated the procedure well and no immediate complications occurred.   SUMMARY: 10-gauge ultrasound guided and vacuum assisted core biopsy of a 2-3 cm right breast mass located at the 11-11 30 position.  A post biopsy marking clip was placed.  PATHOLOGY: Invasive ductal carcinoma ER negative, SD negative, HER-2/mary equivocal. Findings are concordant. The patient will be referred to back to [facility]  This report was finalized on 1/27/2022 11:40 AM by Dr. Svetlana Juárez MD.      MRI Breast Bilateral Diagnostic With & Without Contrast    Result Date: 12/30/2021  1. Known biopsy-proven malignancy on the left 2. Findings highly suggestive of malignancy on the right as described 3. Indeterminate sternal lesion measuring almost 2 cm with questionable expansion/erosion of the anterior sternal cortex. Dedicated bone imaging is recommended.  RECOMMENDATIONS: 1. Diagnostic right mammography and sonography for evaluation for biopsy of the adjacent areas of concern in the right upper outer quadrant. 2. Dedicated bone evaluation of the sternal lesion.  BI-RADS CATEGORY: 5, HIGHLY SUGGESTIVE OF MALIGNANCY  FINAL MRI RESULTS AND RECOMMENDATIONS WILL BE CALLED TO THE PATIENT BY A BREAST CARE NURSE.  This report was finalized on 12/30/2021 11:48 AM by Dr. Svetlana Juárez MD.      NM PET/CT Skull Base to Mid Thigh    Result Date: 1/26/2022   Hypermetabolic soft tissue nodule in the left breast compatible with biopsy-proven invasive ductal carcinoma. There is diffuse low level FDG uptake in the region of recent right breast biopsy site. A hypermetabolic  mass in the left lower lobe is suspicious for pulmonary metastasis. No additional sites of metastases are identified. There is no evidence of discrete/focal hypermetabolic lesion of the sternum to correspond with the indeterminant sternal lesion described on breast MRI exam.   This report was finalized on 1/26/2022 3:33 PM by Anil Saha MD.      Mammo Breast Placement Device Initial Without Biopsy    Result Date: 3/19/2022  Successful needle localization of the known malignancy in the left upper outer quadrant.  Surgical excision will be performed by Dr. Reed.  FINAL PATHOLOGY: Invasive poorly differentiated ductal carcinoma. Gross tumor size 2.8 cm. 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 Positive margins for invasive carcinoma. Findings are concordant. The patient is being followed by [facility].  This report was finalized on 3/19/2022 11:26 AM by Dr. Svetlana Juárez MD.      Mammo Breast Placement Device Initial Without Biopsy    Result Date: 3/19/2022  Successful needle localization of the known right upper outer quadrant malignancy.  Surgical excision will be performed by Dr. Reed.  FINAL PATHOLOGY OF THE RIGHT BREAST: Invasive moderately differentiated ductal carcinoma. Gross tumor size 11 mm. Focal high-grade DCIS. Invasive carcinoma present at the deep/posterior surgical margin. The patient is being cared for by [facility].  This report was finalized on 3/19/2022 11:24 AM by Dr. Svetlana Juárez MD.      Mammo Diagnostic Digital Tomosynthesis Right With CAD    Result Date: 1/27/2022  Findings highly suggestive of malignancy at the 11:00 position of the right breast both mammographically and sonographically.   BI-RADS CATEGORY:  5, HIGHLY SUGGESTIVE OF MALIGNANCY   RECOMMENDATION:  Ultrasound-guided biopsy  CAD was utilized.     10G ELEVATION VACUUM-ASSISTED ULTRASOUND GUIDED CORE BIOPSY    History: Findings highly suggestive of malignancy at the right 11-11 30  "position  Procedure: Written and verbal consent was obtained for ultrasound guided core biopsy of a 2 to 3 cm centimeter ill-defined spiculated area corresponding to MRI nonmass enhancement at the right 11-11 30 position \" Time out\" was observed to verify the patient's identity and correct location of the breast abnormality. The presence of the lesion was confirmed ultrasound and a lateral approach was chosen. The breast was prepped and draped in the usual sterile fashion and 10 mL 1% lidocaine with and without epinephrine was utilized for local anesthesia. A small skin incision was made with a scalpel and a 10-gauge needle with an overlying sheath attached to the core biopsy device was introduced into the breast under direct sonographic guidance. The needle was placed within to the mass. A total of 6 core samples were obtained. The specimens were placed in formalin and forwarded to the pathology department. A post biopsy marking clip was placed. Post biopsy mammographic images were obtained. The clip was noted to be in excellent position in the anterior aspect of the 2 adjacent areas of nonmass enhancement seen by MRI  Upon completion of the procedure, compression was applied to the biopsy site until all appreciable bleeding subsided and a sterile dressing was applied. Post biopsy instructions were reviewed with the patient by our clinical breast imaging staff. A written copy of these instructions was also given to the patient. The patient tolerated the procedure well and no immediate complications occurred.   SUMMARY: 10-gauge ultrasound guided and vacuum assisted core biopsy of a 2-3 cm right breast mass located at the 11-11 30 position.  A post biopsy marking clip was placed.  PATHOLOGY: Invasive ductal carcinoma ER negative, [place] negative, HER-2/mary equivocal. Findings are concordant. The patient will be referred to back to [facility]  This report was finalized on 1/27/2022 11:40 AM by Dr. Svetlana Juárez, " "MD.      US Guided Breast Biopsy With & Without Device initial    Result Date: 1/27/2022  Findings highly suggestive of malignancy at the 11:00 position of the right breast both mammographically and sonographically.   BI-RADS CATEGORY:  5, HIGHLY SUGGESTIVE OF MALIGNANCY   RECOMMENDATION:  Ultrasound-guided biopsy  CAD was utilized.     10G ELEVATION VACUUM-ASSISTED ULTRASOUND GUIDED CORE BIOPSY    History: Findings highly suggestive of malignancy at the right 11-11 30 position  Procedure: Written and verbal consent was obtained for ultrasound guided core biopsy of a 2 to 3 cm centimeter ill-defined spiculated area corresponding to MRI nonmass enhancement at the right 11-11 30 position \" Time out\" was observed to verify the patient's identity and correct location of the breast abnormality. The presence of the lesion was confirmed ultrasound and a lateral approach was chosen. The breast was prepped and draped in the usual sterile fashion and 10 mL 1% lidocaine with and without epinephrine was utilized for local anesthesia. A small skin incision was made with a scalpel and a 10-gauge needle with an overlying sheath attached to the core biopsy device was introduced into the breast under direct sonographic guidance. The needle was placed within to the mass. A total of 6 core samples were obtained. The specimens were placed in formalin and forwarded to the pathology department. A post biopsy marking clip was placed. Post biopsy mammographic images were obtained. The clip was noted to be in excellent position in the anterior aspect of the 2 adjacent areas of nonmass enhancement seen by MRI  Upon completion of the procedure, compression was applied to the biopsy site until all appreciable bleeding subsided and a sterile dressing was applied. Post biopsy instructions were reviewed with the patient by our clinical breast imaging staff. A written copy of these instructions was also given to the patient. The patient tolerated " the procedure well and no immediate complications occurred.   SUMMARY: 10-gauge ultrasound guided and vacuum assisted core biopsy of a 2-3 cm right breast mass located at the 11-11 30 position.  A post biopsy marking clip was placed.  PATHOLOGY: Invasive ductal carcinoma ER negative, [place] negative, HER-2/mary equivocal. Findings are concordant. The patient will be referred to back to [facility]  This report was finalized on 1/27/2022 11:40 AM by Dr. Svetlana Juárez MD.      Final Diagnosis   1.  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  See template #1    2.  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  See template #2    3.  LEFT BREAST, EXTENDED MEDIAL, SUPERIOR, AND DEEP MARGIN:                 Focal residual carcinoma measuring 3 mm near anterior aspect                New anterior margin width is 9 mm    INVASIVE BREAST CANCER STAGING TEMPLATE #1    TYPE OF SPECIMEN/PROCEDURE: Needle localized lumpectomy  SPECIMEN LATERALITY: Right  TUMOR SITE: 11:00  TUMOR SIZE: 11 mm  TUMOR FOCALITY: Single focus  HISTOLOGIC TYPE OF INVASIVE CARCINOMA: Ductal with apocrine  morphology  stGstRstAstDstEst:st st1st Tubule formation: 3                Mitotic activity: 1                Pleomorphism: 3  OVERALL SCORE: 7/9  DUCTAL CARCINOMA IN SITU EXTENT: Focal  TUMOR EXTENSION: Structures not present                Skin: N/A                Skin satellite nodule: N/A                Nipple: N/a                Skeletal muscle: N/A  SURGICAL MARGINS: Invasive carcinoma present at margin                Invasive carcinoma margins: Tumor present at margin                Distance from closest margin: 0 mm                Specific closest margin: Posterior                   DCIS margins: Uninvolved by DCIS                Distance from closest margin: 6 mm                Specific closest margin: Posterior    LYMPH NODE STATUS: None submitted                Number of lymph nodes with macrometastasis: N/A                Number of lymph nodes with micrometastasis: N/A                Number of lymph nodes with isolated tumor cells: N/A  TOTAL NUMBER OF LYMPH NODES EXAMINED: 0  NUMBER OF SENTINEL NODES EXAMINED: 0  EXTRANODAL EXTENSION OF TUMOR: N/A  SIZE OF LARGEST ENOCH METASTATIC DEPOSIT: N/A  VASCULAR/LYMPHATIC INVASION: N/A  DISTANT SITES INVOLVED: Not applicable  ESTROGEN RECEPTOR STATUS BY IHC METHOD: Negative per previous biopsy  PROGESTERONE RECEPTOR STATUS BY IHC METHOD: Negative per previous biopsy  HER-2/LAURA ONCOPROTEIN STATUS BY IHC METHOD: Equivocal (2+) per previous biopsy  HER-2/LAURA ONCOGENE STATUS BY IN SITU HYBRIDIZATION ANALYSIS: Not amplified  BIOPSY UNDER WHICH BREAST BIOMARKERS PERFORMED: SX   TREATMENT EFFECT (BREAST): No known presurgical therapy  TREATMENT EFFECT (LYMPH NODE): No known presurgical therapy  ADDITIONAL PATHOLOGIC FINDINGS: Fibrocystic change  OTHER STUDIES: Available upon request  AJCC PATHOLOGIC STAGE: (COMPLETED BY PATHOLOGIST, BASED ONLY ON TISSUE FINDINGS; MORE EXTENSIVE DISEASE MAY NOT BE KNOWN TO THE PATHOLOGIST)  pT = 1c  pN = x  pM = N/A      INVASIVE  BREAST CANCER STAGING TEMPLATE #2    TYPE OF SPECIMEN/PROCEDURE: Needle localized lumpectomy  SPECIMEN LATERALITY: Left  TUMOR SITE: 2:00  TUMOR SIZE: 28 mm  TUMOR FOCALITY: Single focus  HISTOLOGIC TYPE OF INVASIVE CARCINOMA: Ductal  ndGndRndAndDndEnd:nd nd2nd Tubule formation: 3                Mitotic activity: 2                Pleomorphism: 3  OVERALL SCORE: 8/9  DUCTAL CARCINOMA IN SITU EXTENT: 12 mm  TUMOR EXTENSION: Structures not present                Skin: N/A                Skin satellite nodule: N/A                Nipple: N/A                Skeletal muscle: N/A  SURGICAL MARGINS: Final margins uninvolved by in situ or invasive neoplasm                Invasive carcinoma margins: Extended margin uninvolved                Distance from closest margin: Extended margin 9 mm                Specific closest margin: Anterior                   DCIS margins: Uninvolved                Distance from closest margin: 12 mm                Specific closest margin: Posterior    LYMPH NODE STATUS: No lymph nodes submitted                Number of lymph nodes with macrometastasis: N/A                Number of lymph nodes with micrometastasis: N/A                Number of lymph nodes with isolated tumor cells: N/A  TOTAL NUMBER OF LYMPH NODES EXAMINED: 0  NUMBER OF SENTINEL NODES EXAMINED: 0  EXTRANODAL EXTENSION OF TUMOR: N/A  SIZE OF LARGEST ENOCH METASTATIC DEPOSIT: N/A  VASCULAR/LYMPHATIC INVASION: N/A  DISTANT SITES INVOLVED: N/A  ESTROGEN RECEPTOR STATUS BY IHC METHOD: Positive per previous biopsy  PROGESTERONE RECEPTOR STATUS BY IHC METHOD: Negative per previous biopsy  HER-2/LAURA ONCOPROTEIN STATUS BY IHC METHOD: Negative (0+) per previous biopsy   HER-2/LAURA ONCOGENE STATUS BY IN SITU HYBRIDIZATION ANALYSIS: Not performed  BIOPSY UNDER WHICH BREAST BIOMARKERS PERFORMED: [id number]  TREATMENT EFFECT (BREAST): No known presurgical therapy  TREATMENT EFFECT (LYMPH NODE): No known presurgical therapy  ADDITIONAL PATHOLOGIC  FINDINGS: None significant  OTHER STUDIES: Available upon request  AJCC PATHOLOGIC STAGE: (COMPLETED BY PATHOLOGIST, BASED ONLY ON TISSUE FINDINGS; MORE EXTENSIVE DISEASE MAY NOT BE KNOWN TO THE PATHOLOGIST)  pT = 2  pN = x  pM = not applicable            Assessment/Plan    1.  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  Plan to continue carboplatin and Taxol in the adjuvant setting.  I am going to likely stop at 4 cycles rather than 6.  Her cytopenias are considerable.  I am also going to give her a week off between cycles.  She will subsequently receive radiotherapy and be placed on indefinite hormone blockade.    2.  Metastatic endometrial cancer.  She had a single focus of disease in her lung.  This was treated with CyberKnife.  She is getting adjuvant carboplatin and Taxol.  I plan to treat her with hormone blockade after this since she is ER positive on this.    3.  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  Not transfusion dependent as of yet.  However she will benefit from a week off between cycles.      Ruby Trevino MD  [facility]    Return on: 07/06/22  Return in (Approximately): 1 month    Orders Placed This Encounter   Procedures   • CT Abdomen Pelvis With Contrast   • CT Chest With Contrast Diagnostic       6/8/2022

## 2022-06-21 ENCOUNTER — TELEPHONE (OUTPATIENT)
Dept: ONCOLOGY | Facility: CLINIC | Age: 77
End: 2022-06-21

## 2022-06-21 NOTE — TELEPHONE ENCOUNTER
Caller: Kenisha Jama    Relationship: Self    Best call back number: [phone]    What was the call regarding: KENISHA CALLED TO SAY THAT HER DOCTOR HAS HER ON AN ANTIBIOTIC (CEPHALEXIN 500MG) FOR A BUG BITE. SHE IS WANTING TO KNOW IF THAT WILL INTERFERE WITH HER INFUSION TOMORROW.    Do you require a callback: YES

## 2022-06-21 NOTE — TELEPHONE ENCOUNTER
Returned call to patient. Asking her how long she has been on antibiotic. Patient stating she was started yesterday. Informing patient that Dr. Lieberman is out today. Informing her that nurse will discuss with Dr. Lieberman and as long as she is not running a fever should be fine for treatment tomorrow. Informing patient nurse will mention that  if there is any issues nurse will call patient back. Patient stating she understood.

## 2022-06-22 ENCOUNTER — APPOINTMENT (OUTPATIENT)
Dept: ONCOLOGY | Facility: HOSPITAL | Age: 77
End: 2022-06-22

## 2022-06-22 ENCOUNTER — HOSPITAL ENCOUNTER (OUTPATIENT)
Dept: ONCOLOGY | Facility: HOSPITAL | Age: 77
Setting detail: INFUSION SERIES
Discharge: HOME | End: 2022-06-22

## 2022-06-22 VITALS
TEMPERATURE: 98.5 F | DIASTOLIC BLOOD PRESSURE: 64 MMHG | RESPIRATION RATE: 16 BRPM | SYSTOLIC BLOOD PRESSURE: 127 MMHG | HEIGHT: 62 IN | HEART RATE: 72 BPM | WEIGHT: 156 LBS | BODY MASS INDEX: 28.71 KG/M2

## 2022-06-22 DIAGNOSIS — C54.1 ENDOMETRIAL CANCER: Primary | ICD-10-CM

## 2022-06-22 DIAGNOSIS — C50.412 MALIGNANT NEOPLASM OF UPPER-OUTER QUADRANT OF BOTH BREASTS IN FEMALE, ESTROGEN RECEPTOR POSITIVE: ICD-10-CM

## 2022-06-22 DIAGNOSIS — Z17.0 MALIGNANT NEOPLASM OF UPPER-OUTER QUADRANT OF BOTH BREASTS IN FEMALE, ESTROGEN RECEPTOR POSITIVE: Primary | ICD-10-CM

## 2022-06-22 DIAGNOSIS — C50.412 MALIGNANT NEOPLASM OF UPPER-OUTER QUADRANT OF BOTH BREASTS IN FEMALE, ESTROGEN RECEPTOR POSITIVE: Primary | ICD-10-CM

## 2022-06-22 DIAGNOSIS — Z17.0 MALIGNANT NEOPLASM OF UPPER-OUTER QUADRANT OF BOTH BREASTS IN FEMALE, ESTROGEN RECEPTOR POSITIVE: ICD-10-CM

## 2022-06-22 DIAGNOSIS — C50.411 MALIGNANT NEOPLASM OF UPPER-OUTER QUADRANT OF BOTH BREASTS IN FEMALE, ESTROGEN RECEPTOR POSITIVE: Primary | ICD-10-CM

## 2022-06-22 DIAGNOSIS — C50.411 MALIGNANT NEOPLASM OF UPPER-OUTER QUADRANT OF BOTH BREASTS IN FEMALE, ESTROGEN RECEPTOR POSITIVE: ICD-10-CM

## 2022-06-22 DIAGNOSIS — R91.8 MASS OF LEFT LUNG: ICD-10-CM

## 2022-06-22 LAB
ABO GROUP BLD: NORMAL
ALBUMIN SERPL-MCNC: 3.5 G/DL (ref 3.5–5.2)
ALBUMIN/GLOB SERPL: 1.3 G/DL
ALP SERPL-CCNC: 62 U/L (ref 39–117)
ALT SERPL W P-5'-P-CCNC: 13 U/L (ref 1–33)
ANION GAP SERPL CALCULATED.3IONS-SCNC: 10 MMOL/L (ref 5–15)
AST SERPL-CCNC: 15 U/L (ref 1–32)
BILIRUB SERPL-MCNC: 0.4 MG/DL (ref 0–1.2)
BLD GP AB SCN SERPL QL: NEGATIVE
BUN SERPL-MCNC: 13 MG/DL (ref 8–23)
BUN/CREAT SERPL: 14 (ref 7–25)
CALCIUM SPEC-SCNC: 8.8 MG/DL (ref 8.6–10.5)
CANCER AG125 SERPL QL: 18.2 U/ML (ref 0–38.1)
CHLORIDE SERPL-SCNC: 102 MMOL/L (ref 98–107)
CO2 SERPL-SCNC: 22 MMOL/L (ref 22–29)
CREAT SERPL-MCNC: 0.93 MG/DL (ref 0.57–1)
EGFRCR SERPLBLD CKD-EPI 2021: 64.2 ML/MIN/1.73
ERYTHROCYTE [DISTWIDTH] IN BLOOD BY AUTOMATED COUNT: 17.8 % (ref 12.3–15.4)
GLOBULIN UR ELPH-MCNC: 2.8 GM/DL
GLUCOSE SERPL-MCNC: 152 MG/DL (ref 65–99)
HCT VFR BLD AUTO: 24.2 % (ref 34–46.6)
HGB BLD-MCNC: 7.6 G/DL (ref 12–15.9)
LYMPHOCYTES # BLD AUTO: 1.6 10*3/MM3 (ref 0.7–3.1)
LYMPHOCYTES NFR BLD AUTO: 27.9 % (ref 19.6–45.3)
MCH RBC QN AUTO: 29 PG (ref 26.6–33)
MCHC RBC AUTO-ENTMCNC: 31.5 G/DL (ref 31.5–35.7)
MCV RBC AUTO: 92.1 FL (ref 79–97)
MONOCYTES # BLD AUTO: 0.7 10*3/MM3 (ref 0.1–0.9)
MONOCYTES NFR BLD AUTO: 12.5 % (ref 5–12)
NEUTROPHILS NFR BLD AUTO: 3.4 10*3/MM3 (ref 1.7–7)
NEUTROPHILS NFR BLD AUTO: 59.6 % (ref 42.7–76)
PLATELET # BLD AUTO: 181 10*3/MM3 (ref 140–450)
PMV BLD AUTO: 5.5 FL (ref 6–12)
POTASSIUM SERPL-SCNC: 3.7 MMOL/L (ref 3.5–5.2)
PROT SERPL-MCNC: 6.3 G/DL (ref 6–8.5)
RBC # BLD AUTO: 2.62 10*6/MM3 (ref 3.77–5.28)
RH BLD: POSITIVE
SODIUM SERPL-SCNC: 134 MMOL/L (ref 136–145)
T&S EXPIRATION DATE: NORMAL
WBC NRBC COR # BLD: 5.7 10*3/MM3 (ref 3.4–10.8)

## 2022-06-22 PROCEDURE — 25010000002 PALONOSETRON 0.25 MG/5ML SOLUTION PREFILLED SYRINGE: Performed by: INTERNAL MEDICINE

## 2022-06-22 PROCEDURE — 96417 CHEMO IV INFUS EACH ADDL SEQ: CPT

## 2022-06-22 PROCEDURE — 86900 BLOOD TYPING SEROLOGIC ABO: CPT

## 2022-06-22 PROCEDURE — 86901 BLOOD TYPING SEROLOGIC RH(D): CPT | Performed by: INTERNAL MEDICINE

## 2022-06-22 PROCEDURE — 96375 TX/PRO/DX INJ NEW DRUG ADDON: CPT

## 2022-06-22 PROCEDURE — 25010000002 HEPARIN LOCK FLUSH PER 10 UNITS: Performed by: INTERNAL MEDICINE

## 2022-06-22 PROCEDURE — 36591 DRAW BLOOD OFF VENOUS DEVICE: CPT

## 2022-06-22 PROCEDURE — 25010000002 DIPHENHYDRAMINE PER 50 MG: Performed by: INTERNAL MEDICINE

## 2022-06-22 PROCEDURE — 25010000002 DEXAMETHASONE SODIUM PHOSPHATE 100 MG/10ML SOLUTION: Performed by: INTERNAL MEDICINE

## 2022-06-22 PROCEDURE — 25010000002 PACLITAXEL PER 1 MG: Performed by: INTERNAL MEDICINE

## 2022-06-22 PROCEDURE — 25010000002 CARBOPLATIN PER 50 MG: Performed by: INTERNAL MEDICINE

## 2022-06-22 PROCEDURE — 86920 COMPATIBILITY TEST SPIN: CPT

## 2022-06-22 PROCEDURE — 86850 RBC ANTIBODY SCREEN: CPT | Performed by: INTERNAL MEDICINE

## 2022-06-22 PROCEDURE — 85025 COMPLETE CBC W/AUTO DIFF WBC: CPT | Performed by: INTERNAL MEDICINE

## 2022-06-22 PROCEDURE — P9016 RBC LEUKOCYTES REDUCED: HCPCS

## 2022-06-22 PROCEDURE — 96413 CHEMO IV INFUSION 1 HR: CPT

## 2022-06-22 PROCEDURE — 80053 COMPREHEN METABOLIC PANEL: CPT | Performed by: INTERNAL MEDICINE

## 2022-06-22 PROCEDURE — 86900 BLOOD TYPING SEROLOGIC ABO: CPT | Performed by: INTERNAL MEDICINE

## 2022-06-22 PROCEDURE — 36430 TRANSFUSION BLD/BLD COMPNT: CPT

## 2022-06-22 PROCEDURE — 86304 IMMUNOASSAY TUMOR CA 125: CPT | Performed by: INTERNAL MEDICINE

## 2022-06-22 RX ORDER — HEPARIN SODIUM (PORCINE) LOCK FLUSH IV SOLN 100 UNIT/ML 100 UNIT/ML
500 SOLUTION INTRAVENOUS AS NEEDED
Status: CANCELLED | OUTPATIENT
Start: 2022-06-22

## 2022-06-22 RX ORDER — SODIUM CHLORIDE 0.9 % (FLUSH) 0.9 %
10 SYRINGE (ML) INJECTION AS NEEDED
Status: DISCONTINUED | OUTPATIENT
Start: 2022-06-22 | End: 2022-06-23 | Stop reason: HOSPADM

## 2022-06-22 RX ORDER — SODIUM CHLORIDE 9 MG/ML
250 INJECTION, SOLUTION INTRAVENOUS ONCE
Status: COMPLETED | OUTPATIENT
Start: 2022-06-22 | End: 2022-06-22

## 2022-06-22 RX ORDER — SODIUM CHLORIDE 9 MG/ML
250 INJECTION, SOLUTION INTRAVENOUS AS NEEDED
Status: DISCONTINUED | OUTPATIENT
Start: 2022-06-22 | End: 2022-06-23 | Stop reason: HOSPADM

## 2022-06-22 RX ORDER — HEPARIN SODIUM (PORCINE) LOCK FLUSH IV SOLN 100 UNIT/ML 100 UNIT/ML
500 SOLUTION INTRAVENOUS AS NEEDED
Status: DISCONTINUED | OUTPATIENT
Start: 2022-06-22 | End: 2022-06-23 | Stop reason: HOSPADM

## 2022-06-22 RX ORDER — SODIUM CHLORIDE 0.9 % (FLUSH) 0.9 %
10 SYRINGE (ML) INJECTION AS NEEDED
Status: CANCELLED | OUTPATIENT
Start: 2022-06-22

## 2022-06-22 RX ORDER — SODIUM CHLORIDE 9 MG/ML
250 INJECTION, SOLUTION INTRAVENOUS AS NEEDED
Status: CANCELLED | OUTPATIENT
Start: 2022-06-22

## 2022-06-22 RX ORDER — FAMOTIDINE 20 MG/1
20 TABLET, FILM COATED ORAL AS NEEDED
Status: DISCONTINUED | OUTPATIENT
Start: 2022-06-22 | End: 2022-06-23 | Stop reason: HOSPADM

## 2022-06-22 RX ORDER — FAMOTIDINE 20 MG/1
20 TABLET, FILM COATED ORAL ONCE
Status: COMPLETED | OUTPATIENT
Start: 2022-06-22 | End: 2022-06-22

## 2022-06-22 RX ORDER — DIPHENHYDRAMINE HYDROCHLORIDE 50 MG/ML
50 INJECTION INTRAMUSCULAR; INTRAVENOUS AS NEEDED
Status: DISCONTINUED | OUTPATIENT
Start: 2022-06-22 | End: 2022-06-23 | Stop reason: HOSPADM

## 2022-06-22 RX ORDER — PALONOSETRON 0.05 MG/ML
0.25 INJECTION, SOLUTION INTRAVENOUS ONCE
Status: COMPLETED | OUTPATIENT
Start: 2022-06-22 | End: 2022-06-22

## 2022-06-22 RX ADMIN — Medication 20 ML: at 15:37

## 2022-06-22 RX ADMIN — PALONOSETRON 0.25 MG: 0.25 INJECTION, SOLUTION INTRAVENOUS at 10:14

## 2022-06-22 RX ADMIN — CARBOPLATIN 170 MG: 10 INJECTION, SOLUTION INTRAVENOUS at 12:25

## 2022-06-22 RX ADMIN — Medication 10 ML: at 08:16

## 2022-06-22 RX ADMIN — HEPARIN 500 UNITS: 100 SYRINGE at 08:16

## 2022-06-22 RX ADMIN — DIPHENHYDRAMINE HYDROCHLORIDE 25 MG: 50 INJECTION INTRAMUSCULAR; INTRAVENOUS at 10:20

## 2022-06-22 RX ADMIN — FAMOTIDINE 20 MG: 20 TABLET ORAL at 10:13

## 2022-06-22 RX ADMIN — SODIUM CHLORIDE 250 ML: 9 INJECTION, SOLUTION INTRAVENOUS at 10:14

## 2022-06-22 RX ADMIN — SODIUM CHLORIDE 105 MG: 9 INJECTION, SOLUTION INTRAVENOUS at 11:08

## 2022-06-22 RX ADMIN — DEXAMETHASONE SODIUM PHOSPHATE 12 MG: 10 INJECTION, SOLUTION INTRAMUSCULAR; INTRAVENOUS at 10:19

## 2022-06-22 RX ADMIN — HEPARIN 500 UNITS: 100 SYRINGE at 15:37

## 2022-06-23 ENCOUNTER — APPOINTMENT (OUTPATIENT)
Dept: ONCOLOGY | Facility: HOSPITAL | Age: 77
End: 2022-06-23

## 2022-06-23 LAB
BH BB BLOOD EXPIRATION DATE: NORMAL
BH BB BLOOD TYPE BARCODE: 7300
BH BB DISPENSE STATUS: NORMAL
BH BB PRODUCT CODE: NORMAL
BH BB UNIT NUMBER: NORMAL
CROSSMATCH INTERPRETATION: NORMAL
UNIT  ABO: NORMAL
UNIT  RH: NORMAL

## 2022-06-29 ENCOUNTER — HOSPITAL ENCOUNTER (OUTPATIENT)
Dept: ONCOLOGY | Facility: HOSPITAL | Age: 77
Setting detail: INFUSION SERIES
Discharge: HOME | End: 2022-06-29

## 2022-06-29 ENCOUNTER — HOSPITAL ENCOUNTER (OUTPATIENT)
Dept: ONCOLOGY | Facility: HOSPITAL | Age: 77
Discharge: HOME | End: 2022-06-29

## 2022-06-29 VITALS
TEMPERATURE: 98.6 F | HEART RATE: 63 BPM | HEIGHT: 62 IN | BODY MASS INDEX: 28.34 KG/M2 | SYSTOLIC BLOOD PRESSURE: 136 MMHG | DIASTOLIC BLOOD PRESSURE: 67 MMHG | RESPIRATION RATE: 16 BRPM | WEIGHT: 154 LBS

## 2022-06-29 DIAGNOSIS — Z17.0 MALIGNANT NEOPLASM OF UPPER-OUTER QUADRANT OF BOTH BREASTS IN FEMALE, ESTROGEN RECEPTOR POSITIVE: ICD-10-CM

## 2022-06-29 DIAGNOSIS — C54.1 ENDOMETRIAL CANCER: Primary | ICD-10-CM

## 2022-06-29 DIAGNOSIS — R91.8 MASS OF LEFT LUNG: ICD-10-CM

## 2022-06-29 DIAGNOSIS — C50.411 MALIGNANT NEOPLASM OF UPPER-OUTER QUADRANT OF BOTH BREASTS IN FEMALE, ESTROGEN RECEPTOR POSITIVE: ICD-10-CM

## 2022-06-29 DIAGNOSIS — C50.412 MALIGNANT NEOPLASM OF UPPER-OUTER QUADRANT OF BOTH BREASTS IN FEMALE, ESTROGEN RECEPTOR POSITIVE: ICD-10-CM

## 2022-06-29 LAB
ALBUMIN SERPL-MCNC: 3.7 G/DL (ref 3.5–5.2)
ALBUMIN/GLOB SERPL: 1.3 G/DL
ALP SERPL-CCNC: 54 U/L (ref 39–117)
ALT SERPL W P-5'-P-CCNC: 17 U/L (ref 1–33)
ANION GAP SERPL CALCULATED.3IONS-SCNC: 10 MMOL/L (ref 5–15)
AST SERPL-CCNC: 25 U/L (ref 1–32)
BILIRUB SERPL-MCNC: 0.5 MG/DL (ref 0–1.2)
BUN SERPL-MCNC: 18 MG/DL (ref 8–23)
BUN/CREAT SERPL: 23.7 (ref 7–25)
CALCIUM SPEC-SCNC: 9.5 MG/DL (ref 8.6–10.5)
CHLORIDE SERPL-SCNC: 99 MMOL/L (ref 98–107)
CO2 SERPL-SCNC: 25 MMOL/L (ref 22–29)
CREAT SERPL-MCNC: 0.76 MG/DL (ref 0.57–1)
EGFRCR SERPLBLD CKD-EPI 2021: 81.8 ML/MIN/1.73
ERYTHROCYTE [DISTWIDTH] IN BLOOD BY AUTOMATED COUNT: 16.4 % (ref 12.3–15.4)
GLOBULIN UR ELPH-MCNC: 2.9 GM/DL
GLUCOSE SERPL-MCNC: 145 MG/DL (ref 65–99)
HCT VFR BLD AUTO: 30.1 % (ref 34–46.6)
HGB BLD-MCNC: 9.6 G/DL (ref 12–15.9)
LYMPHOCYTES # BLD AUTO: 1.3 10*3/MM3 (ref 0.7–3.1)
LYMPHOCYTES NFR BLD AUTO: 26.2 % (ref 19.6–45.3)
MCH RBC QN AUTO: 29.1 PG (ref 26.6–33)
MCHC RBC AUTO-ENTMCNC: 31.8 G/DL (ref 31.5–35.7)
MCV RBC AUTO: 91.4 FL (ref 79–97)
MONOCYTES # BLD AUTO: 0.4 10*3/MM3 (ref 0.1–0.9)
MONOCYTES NFR BLD AUTO: 8.3 % (ref 5–12)
NEUTROPHILS NFR BLD AUTO: 3.1 10*3/MM3 (ref 1.7–7)
NEUTROPHILS NFR BLD AUTO: 65.5 % (ref 42.7–76)
PLATELET # BLD AUTO: 158 10*3/MM3 (ref 140–450)
PMV BLD AUTO: 6.1 FL (ref 6–12)
POTASSIUM SERPL-SCNC: 4.3 MMOL/L (ref 3.5–5.2)
PROT SERPL-MCNC: 6.6 G/DL (ref 6–8.5)
RBC # BLD AUTO: 3.29 10*6/MM3 (ref 3.77–5.28)
SODIUM SERPL-SCNC: 134 MMOL/L (ref 136–145)
WBC NRBC COR # BLD: 4.8 10*3/MM3 (ref 3.4–10.8)

## 2022-06-29 PROCEDURE — 25010000002 DEXAMETHASONE SODIUM PHOSPHATE 100 MG/10ML SOLUTION: Performed by: INTERNAL MEDICINE

## 2022-06-29 PROCEDURE — 25010000002 PALONOSETRON 0.25 MG/5ML SOLUTION PREFILLED SYRINGE: Performed by: INTERNAL MEDICINE

## 2022-06-29 PROCEDURE — 96413 CHEMO IV INFUSION 1 HR: CPT

## 2022-06-29 PROCEDURE — 25010000002 CARBOPLATIN PER 50 MG: Performed by: INTERNAL MEDICINE

## 2022-06-29 PROCEDURE — 85025 COMPLETE CBC W/AUTO DIFF WBC: CPT | Performed by: INTERNAL MEDICINE

## 2022-06-29 PROCEDURE — 25010000002 HEPARIN LOCK FLUSH PER 10 UNITS: Performed by: INTERNAL MEDICINE

## 2022-06-29 PROCEDURE — 80053 COMPREHEN METABOLIC PANEL: CPT | Performed by: INTERNAL MEDICINE

## 2022-06-29 PROCEDURE — 25010000002 DIPHENHYDRAMINE PER 50 MG: Performed by: INTERNAL MEDICINE

## 2022-06-29 PROCEDURE — 96417 CHEMO IV INFUS EACH ADDL SEQ: CPT

## 2022-06-29 PROCEDURE — 25010000002 PACLITAXEL PER 1 MG: Performed by: INTERNAL MEDICINE

## 2022-06-29 PROCEDURE — 96375 TX/PRO/DX INJ NEW DRUG ADDON: CPT

## 2022-06-29 RX ORDER — HEPARIN SODIUM (PORCINE) LOCK FLUSH IV SOLN 100 UNIT/ML 100 UNIT/ML
500 SOLUTION INTRAVENOUS AS NEEDED
Status: DISCONTINUED | OUTPATIENT
Start: 2022-06-29 | End: 2022-06-30 | Stop reason: HOSPADM

## 2022-06-29 RX ORDER — FAMOTIDINE 20 MG/1
20 TABLET, FILM COATED ORAL ONCE
Status: COMPLETED | OUTPATIENT
Start: 2022-06-29 | End: 2022-06-29

## 2022-06-29 RX ORDER — PALONOSETRON 0.05 MG/ML
0.25 INJECTION, SOLUTION INTRAVENOUS ONCE
Status: COMPLETED | OUTPATIENT
Start: 2022-06-29 | End: 2022-06-29

## 2022-06-29 RX ORDER — HEPARIN SODIUM (PORCINE) LOCK FLUSH IV SOLN 100 UNIT/ML 100 UNIT/ML
500 SOLUTION INTRAVENOUS AS NEEDED
Status: CANCELLED | OUTPATIENT
Start: 2022-06-29

## 2022-06-29 RX ORDER — SODIUM CHLORIDE 0.9 % (FLUSH) 0.9 %
10 SYRINGE (ML) INJECTION AS NEEDED
Status: CANCELLED | OUTPATIENT
Start: 2022-06-29

## 2022-06-29 RX ORDER — SODIUM CHLORIDE 9 MG/ML
250 INJECTION, SOLUTION INTRAVENOUS ONCE
Status: COMPLETED | OUTPATIENT
Start: 2022-06-29 | End: 2022-06-29

## 2022-06-29 RX ADMIN — CARBOPLATIN 190 MG: 10 INJECTION, SOLUTION INTRAVENOUS at 11:09

## 2022-06-29 RX ADMIN — SODIUM CHLORIDE 105 MG: 9 INJECTION, SOLUTION INTRAVENOUS at 10:04

## 2022-06-29 RX ADMIN — DIPHENHYDRAMINE HYDROCHLORIDE 25 MG: 50 INJECTION, SOLUTION INTRAMUSCULAR; INTRAVENOUS at 09:04

## 2022-06-29 RX ADMIN — HEPARIN SODIUM (PORCINE) LOCK FLUSH IV SOLN 100 UNIT/ML 500 UNITS: 100 SOLUTION at 11:40

## 2022-06-29 RX ADMIN — DEXAMETHASONE SODIUM PHOSPHATE 12 MG: 10 INJECTION, SOLUTION INTRAMUSCULAR; INTRAVENOUS at 09:04

## 2022-06-29 RX ADMIN — PALONOSETRON 0.25 MG: 0.25 INJECTION, SOLUTION INTRAVENOUS at 09:03

## 2022-06-29 RX ADMIN — FAMOTIDINE 20 MG: 20 TABLET ORAL at 09:03

## 2022-06-29 RX ADMIN — SODIUM CHLORIDE 250 ML: 9 INJECTION, SOLUTION INTRAVENOUS at 09:02

## 2022-07-06 ENCOUNTER — HOSPITAL ENCOUNTER (OUTPATIENT)
Dept: CT IMAGING | Facility: HOSPITAL | Age: 77
Discharge: HOME | End: 2022-07-06

## 2022-07-06 ENCOUNTER — LAB (OUTPATIENT)
Dept: LAB | Facility: HOSPITAL | Age: 77
End: 2022-07-06

## 2022-07-06 ENCOUNTER — OFFICE VISIT (OUTPATIENT)
Dept: ONCOLOGY | Facility: CLINIC | Age: 77
End: 2022-07-06

## 2022-07-06 ENCOUNTER — HOSPITAL ENCOUNTER (OUTPATIENT)
Dept: ONCOLOGY | Facility: HOSPITAL | Age: 77
Setting detail: INFUSION SERIES
Discharge: HOME | End: 2022-07-06

## 2022-07-06 VITALS — HEART RATE: 61 BPM | DIASTOLIC BLOOD PRESSURE: 64 MMHG | SYSTOLIC BLOOD PRESSURE: 122 MMHG

## 2022-07-06 VITALS
HEIGHT: 62 IN | HEART RATE: 72 BPM | DIASTOLIC BLOOD PRESSURE: 68 MMHG | RESPIRATION RATE: 18 BRPM | TEMPERATURE: 97.3 F | SYSTOLIC BLOOD PRESSURE: 111 MMHG | OXYGEN SATURATION: 99 % | BODY MASS INDEX: 28.58 KG/M2 | WEIGHT: 155.3 LBS

## 2022-07-06 DIAGNOSIS — C54.1 ENDOMETRIAL CANCER: Primary | ICD-10-CM

## 2022-07-06 DIAGNOSIS — Z17.0 MALIGNANT NEOPLASM OF UPPER-OUTER QUADRANT OF BOTH BREASTS IN FEMALE, ESTROGEN RECEPTOR POSITIVE: ICD-10-CM

## 2022-07-06 DIAGNOSIS — C54.1 ENDOMETRIAL CANCER: ICD-10-CM

## 2022-07-06 DIAGNOSIS — R91.8 MASS OF LEFT LUNG: ICD-10-CM

## 2022-07-06 DIAGNOSIS — C50.412 MALIGNANT NEOPLASM OF UPPER-OUTER QUADRANT OF BOTH BREASTS IN FEMALE, ESTROGEN RECEPTOR POSITIVE: ICD-10-CM

## 2022-07-06 DIAGNOSIS — C50.411 MALIGNANT NEOPLASM OF UPPER-OUTER QUADRANT OF BOTH BREASTS IN FEMALE, ESTROGEN RECEPTOR POSITIVE: ICD-10-CM

## 2022-07-06 LAB
ALBUMIN SERPL-MCNC: 3.7 G/DL (ref 3.5–5.2)
ALBUMIN/GLOB SERPL: 1.2 G/DL
ALP SERPL-CCNC: 58 U/L (ref 39–117)
ALT SERPL W P-5'-P-CCNC: 15 U/L (ref 1–33)
ANION GAP SERPL CALCULATED.3IONS-SCNC: 8 MMOL/L (ref 5–15)
AST SERPL-CCNC: 19 U/L (ref 1–32)
BASOPHILS # BLD AUTO: 0.04 10*3/MM3 (ref 0–0.2)
BASOPHILS NFR BLD AUTO: 0.7 % (ref 0–1.5)
BILIRUB SERPL-MCNC: 0.8 MG/DL (ref 0–1.2)
BUN SERPL-MCNC: 18 MG/DL (ref 8–23)
BUN/CREAT SERPL: 25.7 (ref 7–25)
CALCIUM SPEC-SCNC: 9.1 MG/DL (ref 8.6–10.5)
CHLORIDE SERPL-SCNC: 98 MMOL/L (ref 98–107)
CO2 SERPL-SCNC: 24 MMOL/L (ref 22–29)
CREAT SERPL-MCNC: 0.7 MG/DL (ref 0.57–1)
DEPRECATED RDW RBC AUTO: 50.4 FL (ref 37–54)
EGFRCR SERPLBLD CKD-EPI 2021: 90.3 ML/MIN/1.73
EOSINOPHIL # BLD AUTO: 0.01 10*3/MM3 (ref 0–0.4)
EOSINOPHIL NFR BLD AUTO: 0.2 % (ref 0.3–6.2)
ERYTHROCYTE [DISTWIDTH] IN BLOOD BY AUTOMATED COUNT: 15.8 % (ref 12.3–15.4)
GLOBULIN UR ELPH-MCNC: 3 GM/DL
GLUCOSE SERPL-MCNC: 124 MG/DL (ref 65–99)
HCT VFR BLD AUTO: 26 % (ref 34–46.6)
HGB BLD-MCNC: 9 G/DL (ref 12–15.9)
IMM GRANULOCYTES # BLD AUTO: 0.06 10*3/MM3 (ref 0–0.05)
IMM GRANULOCYTES NFR BLD AUTO: 1.1 % (ref 0–0.5)
LYMPHOCYTES # BLD AUTO: 0.93 10*3/MM3 (ref 0.7–3.1)
LYMPHOCYTES NFR BLD AUTO: 16.5 % (ref 19.6–45.3)
MCH RBC QN AUTO: 31.5 PG (ref 26.6–33)
MCHC RBC AUTO-ENTMCNC: 34.6 G/DL (ref 31.5–35.7)
MCV RBC AUTO: 90.9 FL (ref 79–97)
MONOCYTES # BLD AUTO: 0.48 10*3/MM3 (ref 0.1–0.9)
MONOCYTES NFR BLD AUTO: 8.5 % (ref 5–12)
NEUTROPHILS NFR BLD AUTO: 4.12 10*3/MM3 (ref 1.7–7)
NEUTROPHILS NFR BLD AUTO: 73 % (ref 42.7–76)
NRBC BLD AUTO-RTO: 0 /100 WBC (ref 0–0.2)
PLATELET # BLD AUTO: 98 10*3/MM3 (ref 140–450)
PMV BLD AUTO: 10 FL (ref 6–12)
POTASSIUM SERPL-SCNC: 4.6 MMOL/L (ref 3.5–5.2)
PROT SERPL-MCNC: 6.7 G/DL (ref 6–8.5)
RBC # BLD AUTO: 2.86 10*6/MM3 (ref 3.77–5.28)
SODIUM SERPL-SCNC: 130 MMOL/L (ref 136–145)
WBC NRBC COR # BLD: 5.64 10*3/MM3 (ref 3.4–10.8)

## 2022-07-06 PROCEDURE — 71260 CT THORAX DX C+: CPT

## 2022-07-06 PROCEDURE — 74177 CT ABD & PELVIS W/CONTRAST: CPT

## 2022-07-06 PROCEDURE — 96413 CHEMO IV INFUSION 1 HR: CPT

## 2022-07-06 PROCEDURE — 25010000002 DIPHENHYDRAMINE PER 50 MG: Performed by: INTERNAL MEDICINE

## 2022-07-06 PROCEDURE — 96417 CHEMO IV INFUS EACH ADDL SEQ: CPT

## 2022-07-06 PROCEDURE — 96375 TX/PRO/DX INJ NEW DRUG ADDON: CPT

## 2022-07-06 PROCEDURE — 36415 COLL VENOUS BLD VENIPUNCTURE: CPT

## 2022-07-06 PROCEDURE — 25010000002 PALONOSETRON 0.25 MG/5ML SOLUTION PREFILLED SYRINGE: Performed by: INTERNAL MEDICINE

## 2022-07-06 PROCEDURE — 96367 TX/PROPH/DG ADDL SEQ IV INF: CPT

## 2022-07-06 PROCEDURE — 99214 OFFICE O/P EST MOD 30 MIN: CPT | Performed by: INTERNAL MEDICINE

## 2022-07-06 PROCEDURE — 25010000002 IOPAMIDOL 61 % SOLUTION: Performed by: INTERNAL MEDICINE

## 2022-07-06 PROCEDURE — 25010000002 PACLITAXEL PER 1 MG: Performed by: INTERNAL MEDICINE

## 2022-07-06 PROCEDURE — 85025 COMPLETE CBC W/AUTO DIFF WBC: CPT

## 2022-07-06 PROCEDURE — 80053 COMPREHEN METABOLIC PANEL: CPT | Performed by: INTERNAL MEDICINE

## 2022-07-06 PROCEDURE — 25010000002 DEXAMETHASONE SODIUM PHOSPHATE 100 MG/10ML SOLUTION: Performed by: INTERNAL MEDICINE

## 2022-07-06 PROCEDURE — 25010000002 CARBOPLATIN PER 50 MG: Performed by: INTERNAL MEDICINE

## 2022-07-06 PROCEDURE — 25010000002 HEPARIN LOCK FLUSH PER 10 UNITS: Performed by: INTERNAL MEDICINE

## 2022-07-06 RX ORDER — PALONOSETRON 0.05 MG/ML
0.25 INJECTION, SOLUTION INTRAVENOUS ONCE
Status: CANCELLED | OUTPATIENT
Start: 2022-07-27

## 2022-07-06 RX ORDER — FAMOTIDINE 10 MG/ML
20 INJECTION, SOLUTION INTRAVENOUS AS NEEDED
Status: CANCELLED | OUTPATIENT
Start: 2022-07-27

## 2022-07-06 RX ORDER — HEPARIN SODIUM (PORCINE) LOCK FLUSH IV SOLN 100 UNIT/ML 100 UNIT/ML
500 SOLUTION INTRAVENOUS AS NEEDED
Status: DISCONTINUED | OUTPATIENT
Start: 2022-07-06 | End: 2022-07-07 | Stop reason: HOSPADM

## 2022-07-06 RX ORDER — FAMOTIDINE 10 MG/ML
20 INJECTION, SOLUTION INTRAVENOUS AS NEEDED
Status: CANCELLED | OUTPATIENT
Start: 2022-07-13

## 2022-07-06 RX ORDER — FAMOTIDINE 10 MG/ML
20 INJECTION, SOLUTION INTRAVENOUS ONCE
Status: CANCELLED | OUTPATIENT
Start: 2022-07-20

## 2022-07-06 RX ORDER — SODIUM CHLORIDE 9 MG/ML
250 INJECTION, SOLUTION INTRAVENOUS ONCE
Status: CANCELLED | OUTPATIENT
Start: 2022-07-20

## 2022-07-06 RX ORDER — HEPARIN SODIUM (PORCINE) LOCK FLUSH IV SOLN 100 UNIT/ML 100 UNIT/ML
500 SOLUTION INTRAVENOUS AS NEEDED
Status: CANCELLED | OUTPATIENT
Start: 2022-07-06

## 2022-07-06 RX ORDER — PALONOSETRON 0.05 MG/ML
0.25 INJECTION, SOLUTION INTRAVENOUS ONCE
Status: CANCELLED | OUTPATIENT
Start: 2022-07-20

## 2022-07-06 RX ORDER — FAMOTIDINE 10 MG/ML
20 INJECTION, SOLUTION INTRAVENOUS ONCE
Status: CANCELLED | OUTPATIENT
Start: 2022-07-13

## 2022-07-06 RX ORDER — FAMOTIDINE 10 MG/ML
20 INJECTION, SOLUTION INTRAVENOUS ONCE
Status: CANCELLED | OUTPATIENT
Start: 2022-07-27

## 2022-07-06 RX ORDER — PALONOSETRON 0.05 MG/ML
0.25 INJECTION, SOLUTION INTRAVENOUS ONCE
Status: COMPLETED | OUTPATIENT
Start: 2022-07-06 | End: 2022-07-06

## 2022-07-06 RX ORDER — DIPHENHYDRAMINE HYDROCHLORIDE 50 MG/ML
50 INJECTION INTRAMUSCULAR; INTRAVENOUS AS NEEDED
Status: CANCELLED | OUTPATIENT
Start: 2022-07-13

## 2022-07-06 RX ORDER — SODIUM CHLORIDE 0.9 % (FLUSH) 0.9 %
10 SYRINGE (ML) INJECTION AS NEEDED
Status: CANCELLED | OUTPATIENT
Start: 2022-07-06

## 2022-07-06 RX ORDER — PALONOSETRON 0.05 MG/ML
0.25 INJECTION, SOLUTION INTRAVENOUS ONCE
Status: CANCELLED | OUTPATIENT
Start: 2022-07-13

## 2022-07-06 RX ORDER — DIPHENHYDRAMINE HYDROCHLORIDE 50 MG/ML
50 INJECTION INTRAMUSCULAR; INTRAVENOUS AS NEEDED
Status: CANCELLED | OUTPATIENT
Start: 2022-07-27

## 2022-07-06 RX ORDER — SODIUM CHLORIDE 9 MG/ML
250 INJECTION, SOLUTION INTRAVENOUS ONCE
Status: CANCELLED | OUTPATIENT
Start: 2022-07-13

## 2022-07-06 RX ORDER — SODIUM CHLORIDE 9 MG/ML
250 INJECTION, SOLUTION INTRAVENOUS ONCE
Status: CANCELLED | OUTPATIENT
Start: 2022-07-27

## 2022-07-06 RX ORDER — FAMOTIDINE 10 MG/ML
20 INJECTION, SOLUTION INTRAVENOUS AS NEEDED
Status: CANCELLED | OUTPATIENT
Start: 2022-07-20

## 2022-07-06 RX ORDER — FAMOTIDINE 10 MG/ML
20 INJECTION, SOLUTION INTRAVENOUS ONCE
Status: COMPLETED | OUTPATIENT
Start: 2022-07-06 | End: 2022-07-06

## 2022-07-06 RX ORDER — DIPHENHYDRAMINE HYDROCHLORIDE 50 MG/ML
50 INJECTION INTRAMUSCULAR; INTRAVENOUS AS NEEDED
Status: CANCELLED | OUTPATIENT
Start: 2022-07-20

## 2022-07-06 RX ORDER — SODIUM CHLORIDE 9 MG/ML
250 INJECTION, SOLUTION INTRAVENOUS ONCE
Status: COMPLETED | OUTPATIENT
Start: 2022-07-06 | End: 2022-07-06

## 2022-07-06 RX ADMIN — FAMOTIDINE 20 MG: 10 INJECTION, SOLUTION INTRAVENOUS at 11:10

## 2022-07-06 RX ADMIN — CARBOPLATIN 200 MG: 10 INJECTION, SOLUTION INTRAVENOUS at 13:09

## 2022-07-06 RX ADMIN — PACLITAXEL 105 MG: 6 INJECTION, SOLUTION INTRAVENOUS at 11:57

## 2022-07-06 RX ADMIN — DIPHENHYDRAMINE HYDROCHLORIDE 25 MG: 50 INJECTION INTRAMUSCULAR; INTRAVENOUS at 11:09

## 2022-07-06 RX ADMIN — PALONOSETRON 0.25 MG: 0.25 INJECTION, SOLUTION INTRAVENOUS at 11:09

## 2022-07-06 RX ADMIN — DEXAMETHASONE SODIUM PHOSPHATE 12 MG: 10 INJECTION, SOLUTION INTRAMUSCULAR; INTRAVENOUS at 11:09

## 2022-07-06 RX ADMIN — HEPARIN SODIUM (PORCINE) LOCK FLUSH IV SOLN 100 UNIT/ML 500 UNITS: 100 SOLUTION at 13:45

## 2022-07-06 RX ADMIN — SODIUM CHLORIDE 250 ML: 9 INJECTION, SOLUTION INTRAVENOUS at 11:08

## 2022-07-06 RX ADMIN — IOPAMIDOL 85 ML: 612 INJECTION, SOLUTION INTRAVENOUS at 08:48

## 2022-07-06 NOTE — PROGRESS NOTES
PROBLEM LIST:  Oncology/Hematology History   Endometrial cancer (HCC)   9/30/2011 Imaging    TVUS and CT scan for palpable left pelvic mass upon annual exam and new abdominal symptoms.      10/6/2011 Surgery    ADY/BSO with pelvic lymph node dissection. Stage 1A grade 2 endometrial adenocarcinoma by final pathology     1/26/2022 Imaging    IMPRESSION:     Hypermetabolic soft tissue nodule in the left breast compatible with  biopsy-proven invasive ductal carcinoma. There is diffuse low level FDG  uptake in the region of recent right breast biopsy site. A  hypermetabolic mass in the left lower lobe is suspicious for pulmonary  metastasis. No additional sites of metastases are identified. There is  no evidence of discrete/focal hypermetabolic lesion of the sternum to  correspond with the indeterminant sternal lesion described on breast MRI  exam.     2/14/2022 Biopsy    Final Diagnosis   LEFT LUNG, BIOPSY:  Metastatic adenocarcinoma.  See comment.  GJK   Electronically signed by Wolfgang Hair MD on 2/22/2022 at 1154   Comment    Sections show small fragments of tumor that are morphologically distinct from the patient's known breast cancers.  Immunoprofile suggests mullerian origin.  Clinical correlation required.  This case was shown for intradepartmental consultation and the other pathologist concurs with the findings interpretation.  This case was discussed with Dr. Trevino on 02/21/22.  This case was discussed with Dr. Reed on 02/22/22.        5/4/2022 -  Chemotherapy    OP GYN PACLitaxel / CARBOplatin AUC=2 (Weekly)     5/4/2022 -  Chemotherapy    OP CENTRAL VENOUS ACCESS DEVICE ACCESS, CARE, AND MAINTENANCE ([facility])     Malignant neoplasm of upper-outer quadrant of both breasts in female, estrogen receptor positive (HCC)   1/21/2022 Initial Diagnosis    Malignant neoplasm of upper-outer quadrant of both breasts in female, estrogen receptor positive (HCC)     1/24/2022 Biopsy    1.  Right breast cancer-invasive  ductal carcinoma grade 2-ER negative MI negative HER-2 negative    2.  Left breast cancer-invasive ductal carcinoma grade 2-ER positive MI negative HER-2 negative     1/26/2022 Imaging    EXAMINATION: NM PET/CT SKULL BASE TO MID THIGH      FINDINGS:      Today's 3-D images demonstrate focal hypermetabolism in the soft tissues  of the left breast as well as focal hypermetabolism within the left  midlung.     Multiplanar images of the head and neck demonstrate symmetric FDG uptake  within the brain. There is no evidence of hypermetabolic neck mass or  lymph node.     In the upper outer quadrant of the left breast there is redemonstration  of a irregular 2.1 cm soft tissue nodule with FDG hypermetabolism of SUV  max 4.2, compatible with biopsy-proven invasive ductal carcinoma. There  is an ill-defined diffuse region of low-level hypermetabolism in the  right breast with adjacent biopsy clip and single locule of soft tissue  air, compatible with recent right breast biopsy. A discrete  hypermetabolic nodule or mass in the right breast is not confidently  identified. There is no hypermetabolic or enlarged axillary lymph nodes.     Within the chest there is redemonstration of a lobulated soft tissue  mass in the superior aspect of the left lower lobe which appears  hypermetabolic with SUV max 6.5. There is no evidence of hypermetabolic  mediastinal or hilar adenopathy.     Below the diaphragm there is expected physiologic uptake within the   and GI tracts. No evidence of abdominal pelvic malignancy or metastasis.  No hypermetabolic abdominopelvic lymph nodes. Photopenic left renal cyst  is noted.     There is no hypermetabolic pathologic marrow process. Specifically,  there is no evidence of hypermetabolic lesion of the sternum to  correspond with the indeterminant sternal lesion detailed on prior  breast MRI exam. FDG uptake at the left antecubital fossa reflects site  of IV administration.     IMPRESSION:      Hypermetabolic soft tissue nodule in the left breast compatible with  biopsy-proven invasive ductal carcinoma. There is diffuse low level FDG  uptake in the region of recent right breast biopsy site. A  hypermetabolic mass in the left lower lobe is suspicious for pulmonary  metastasis. No additional sites of metastases are identified. There is  no evidence of discrete/focal hypermetabolic lesion of the sternum to  correspond with the indeterminant sternal lesion described on breast MRI  exam.           4/20/2022 Cancer Staged    Staging form: Breast, AJCC 8th Edition  - Pathologic: Stage IB (pT1c, pN0, cM0, G3, ER-, AK-, HER2-) - Signed by Ruby Trevino MD on 4/20/2022 5/4/2022 -  Chemotherapy    OP CENTRAL VENOUS ACCESS DEVICE ACCESS, CARE, AND MAINTENANCE ([facility])     Malignant neoplasm metastatic to left lung (HCC) (Resolved)   3/23/2022 Initial Diagnosis    Malignant neoplasm metastatic to left lung (HCC) (Resolved)     4/26/2022 - 4/26/2022 Radiation    Radiation OncologyTreatment Course:  Kenisha Jama received 2500 cGy in 1 fraction to left lung metastasis via Stereotactic Radiation Therapy - SRT.         REASON FOR VISIT: Management of my cancers     HISTORY OF PRESENT ILLNESS:   76 y.o.  female presents today for follow-up.  She is currently on carboplatin and Taxol weekly.  We are treating her adjuvantly for both triple negative breast cancer and endometrial cancer.  She had CyberKnife to the endometrial cancer focus in the lung.  She is tolerating her treatment reasonably well.  No significant issues with side effects.  No recent infections.  No significant neuropathy.  No major issues with nausea vomiting.  No issues with headaches.  No shortness of breath.    Past medical history, social history and family history was reviewed 07/06/22 and unchanged from prior visit.    Review of Systems:    Review of Systems   Constitutional: Positive for fatigue.   HENT:  Negative.    Eyes: Negative.     Respiratory: Negative.    Cardiovascular: Negative.    Gastrointestinal: Negative.    Endocrine: Negative.    Genitourinary: Negative.     Musculoskeletal: Negative.    Skin: Negative.    Neurological: Negative.    Hematological: Negative.    Psychiatric/Behavioral: Negative.             Medications:        Current Outpatient Medications:   •  acetaminophen (Tylenol 8 Hour) 650 MG 8 hr tablet, Take 1 tablet by mouth Every 8 (Eight) Hours As Needed for Mild Pain ., Disp: 40 tablet, Rfl: 0  •  atorvastatin (LIPITOR) 20 MG tablet, Take 20 mg by mouth Every Evening., Disp: , Rfl:   •  carvedilol (COREG) 12.5 MG tablet, Take 12.5 mg by mouth 2 (Two) Times a Day With Meals., Disp: , Rfl:   •  ezetimibe (ZETIA) 10 MG tablet, Take 10 mg by mouth Every Night., Disp: , Rfl:   •  ferrous sulfate 325 (65 FE) MG tablet, Take 325 mg by mouth Daily With Breakfast., Disp: , Rfl:   •  ibuprofen (ADVIL,MOTRIN) 600 MG tablet, Take 1 tablet by mouth Every 6 (Six) Hours As Needed for Mild Pain ., Disp: 15 tablet, Rfl: 0  •  ketorolac (ACULAR) 0.5 % ophthalmic solution, , Disp: , Rfl:   •  lidocaine-prilocaine (EMLA) 2.5-2.5 % cream, Apply 1 application topically to the appropriate area as directed As Needed for Injection Site Pain. Apply 45-60 minutes before port access, cover with plastic wrap after application., Disp: 5 g, Rfl: 5  •  lisinopril (PRINIVIL,ZESTRIL) 40 MG tablet, Take 40 mg by mouth Daily., Disp: , Rfl:   •  NIFEdipine XL (PROCARDIA XL) 30 MG 24 hr tablet, Take 30 mg by mouth Daily., Disp: , Rfl:   •  ondansetron (ZOFRAN) 8 MG tablet, Take 1 tablet by mouth 3 (Three) Times a Day As Needed for Nausea or Vomiting., Disp: 30 tablet, Rfl: 5  •  pantoprazole (PROTONIX) 40 MG EC tablet, , Disp: , Rfl:   •  pravastatin (PRAVACHOL) 80 MG tablet, , Disp: , Rfl: 1  •  ursodiol (ACTIGALL) 500 MG tablet, , Disp: , Rfl:     Current Facility-Administered Medications:   •  lidocaine (XYLOCAINE) 1 % injection 5 mL, 5 mL, Infiltration,  "Once, Svetlana Juárez MD    Facility-Administered Medications Ordered in Other Visits:   •  barium (READI-CAT 2) suspension 450 mL, 450 mL, Oral, Once in imaging, Ruby Trevino MD    Pain Medications             acetaminophen (Tylenol 8 Hour) 650 MG 8 hr tablet Take 1 tablet by mouth Every 8 (Eight) Hours As Needed for Mild Pain .    ibuprofen (ADVIL,MOTRIN) 600 MG tablet Take 1 tablet by mouth Every 6 (Six) Hours As Needed for Mild Pain .             ALLERGIES:  No Known Allergies      Physical Exam    VITAL SIGNS:  /68   Pulse 72   Temp 97.3 °F (36.3 °C) (Infrared)   Resp 18   Ht 157.5 cm (62.01\")   Wt 70.4 kg (155 lb 4.8 oz)   SpO2 99%   BMI 28.40 kg/m²     ECOG score: 0           Wt Readings from Last 3 Encounters:   07/06/22 70.4 kg (155 lb 4.8 oz)   06/29/22 69.9 kg (154 lb)   06/22/22 70.8 kg (156 lb)       Body mass index is 28.4 kg/m². Body surface area is 1.72 meters squared.    Pain Score    07/06/22 0934   PainSc: 0-No pain           Performance Status: 0    General: well appearing, in no acute distress  HEENT: sclera anicteric, neck is supple  Lymphatics: no cervical, supraclavicular, or axillary adenopathy  Cardiovascular: regular rate and rhythm, no murmurs, rubs or gallops  Lungs: clear to auscultation bilaterally  Abdomen: soft, nontender, nondistended.  No palpable organomegaly  Extremities: no lower extremity edema  Skin: no rashes, lesions, bruising, or petechiae  Msk:  Shows no weakness of the large muscle groups  Psych: Mood is stable        RECENT LABS:    Lab Results   Component Value Date    HGB 9.0 (L) 07/06/2022    HCT 26.0 (L) 07/06/2022    MCV 90.9 07/06/2022    PLT 98 (L) 07/06/2022    WBC 5.64 07/06/2022    NEUTROABS 4.12 07/06/2022    LYMPHSABS 0.93 07/06/2022    MONOSABS 0.48 07/06/2022    EOSABS 0.01 07/06/2022    BASOSABS 0.04 07/06/2022       Lab Results   Component Value Date    GLUCOSE 145 (H) 06/29/2022    BUN 18 06/29/2022    CREATININE 0.76 06/29/2022 "     (L) 06/29/2022    K 4.3 06/29/2022    CL 99 06/29/2022    CO2 25.0 06/29/2022    CALCIUM 9.5 06/29/2022    PROTEINTOT 6.6 06/29/2022    ALBUMIN 3.70 06/29/2022    BILITOT 0.5 06/29/2022    ALKPHOS 54 06/29/2022    AST 25 06/29/2022    ALT 17 06/29/2022       CT Chest With Contrast Diagnostic    Result Date: 1/10/2022  CT demonstrates no specific osseous correlate to the sternal lesion noted on recent MRI. No corresponding lytic or sclerotic osseous lesion is present. There is no evidence of cortical breakthrough.  An irregular lobulated homogeneous 3.7 x 2.1 cm nodule is noted along the fissure the left lung base. Findings remain suspicious for metastatic involvement, however granulomatous process or possibly pulmonary AVM could have similar appearance. PET/CT would be useful to characterize but this finding and further evaluate for possible CT occult sternal osseous metastasis.  Redemonstration of bilateral soft tissue breast masses concerning for malignancy, better characterized on recent MRI.  This report was finalized on 1/10/2022 3:34 PM by Rodríguez Bacon.      IR Fluoro Guidance Only for Central Line    Result Date: 3/16/2022  10 seconds intraoperative fluoroscopy time during port placement performed by Dr. Reed. Details the procedure can be found in Dr. Reed's note.  This report was finalized on 3/16/2022 2:10 PM by Shemar Ulrich MD.      XR Chest 1 View    Result Date: 3/16/2022  No pneumothorax status post port placement  Atelectasis in the lower left lung  This report was finalized on 3/16/2022 1:22 PM by Pepito Leblanc.      XR Chest 1 View    Result Date: 2/14/2022  No pneumothorax.  This report was finalized on 2/14/2022 2:29 PM by Rodríguez Bacon.      XR Chest 1 View    Result Date: 2/14/2022  Left lower lobe pulmonary nodule faintly seen. There is no pneumothorax or other evident immediate complication. Unchanged heart and mediastinal contours.  This report was finalized on 2/14/2022  "1:06 PM by Rodríguez Bacon.      CT Needle Biopsy Lung    Result Date: 2/16/2022  Impression:                                                              Successful CT guided core biopsy of a mass in the left lobe of the lung as described above.  Thank you for the opportunity to assist in the care of your patient.  This report was finalized on 2/16/2022 8:39 AM by Geovani Wilkins MD.      Mammo Post Clip Placement Right    Result Date: 1/27/2022  Findings highly suggestive of malignancy at the 11:00 position of the right breast both mammographically and sonographically.   BI-RADS CATEGORY:  5, HIGHLY SUGGESTIVE OF MALIGNANCY   RECOMMENDATION:  Ultrasound-guided biopsy  CAD was utilized.     10G ELEVATION VACUUM-ASSISTED ULTRASOUND GUIDED CORE BIOPSY    History: Findings highly suggestive of malignancy at the right 11-11 30 position  Procedure: Written and verbal consent was obtained for ultrasound guided core biopsy of a 2 to 3 cm centimeter ill-defined spiculated area corresponding to MRI nonmass enhancement at the right 11-11 30 position \" Time out\" was observed to verify the patient's identity and correct location of the breast abnormality. The presence of the lesion was confirmed ultrasound and a lateral approach was chosen. The breast was prepped and draped in the usual sterile fashion and 10 mL 1% lidocaine with and without epinephrine was utilized for local anesthesia. A small skin incision was made with a scalpel and a 10-gauge needle with an overlying sheath attached to the core biopsy device was introduced into the breast under direct sonographic guidance. The needle was placed within to the mass. A total of 6 core samples were obtained. The specimens were placed in formalin and forwarded to the pathology department. A post biopsy marking clip was placed. Post biopsy mammographic images were obtained. The clip was noted to be in excellent position in the anterior aspect of the 2 adjacent areas of nonmass " enhancement seen by MRI  Upon completion of the procedure, compression was applied to the biopsy site until all appreciable bleeding subsided and a sterile dressing was applied. Post biopsy instructions were reviewed with the patient by our clinical breast imaging staff. A written copy of these instructions was also given to the patient. The patient tolerated the procedure well and no immediate complications occurred.   SUMMARY: 10-gauge ultrasound guided and vacuum assisted core biopsy of a 2-3 cm right breast mass located at the 11-11 30 position.  A post biopsy marking clip was placed.  PATHOLOGY: Invasive ductal carcinoma ER negative, ND negative, HER-2/mary equivocal. Findings are concordant. The patient will be referred to back to [facility]  This report was finalized on 1/27/2022 11:40 AM by Dr. Svetlana Juárez MD.      MRI Breast Bilateral Diagnostic With & Without Contrast    Result Date: 12/30/2021  1. Known biopsy-proven malignancy on the left 2. Findings highly suggestive of malignancy on the right as described 3. Indeterminate sternal lesion measuring almost 2 cm with questionable expansion/erosion of the anterior sternal cortex. Dedicated bone imaging is recommended.  RECOMMENDATIONS: 1. Diagnostic right mammography and sonography for evaluation for biopsy of the adjacent areas of concern in the right upper outer quadrant. 2. Dedicated bone evaluation of the sternal lesion.  BI-RADS CATEGORY: 5, HIGHLY SUGGESTIVE OF MALIGNANCY  FINAL MRI RESULTS AND RECOMMENDATIONS WILL BE CALLED TO THE PATIENT BY A BREAST CARE NURSE.  This report was finalized on 12/30/2021 11:48 AM by Dr. Svetlana Juárez MD.      NM PET/CT Skull Base to Mid Thigh    Result Date: 1/26/2022   Hypermetabolic soft tissue nodule in the left breast compatible with biopsy-proven invasive ductal carcinoma. There is diffuse low level FDG uptake in the region of recent right breast biopsy site. A hypermetabolic mass in the left lower lobe  is suspicious for pulmonary metastasis. No additional sites of metastases are identified. There is no evidence of discrete/focal hypermetabolic lesion of the sternum to correspond with the indeterminant sternal lesion described on breast MRI exam.   This report was finalized on 1/26/2022 3:33 PM by Anil Saha MD.      Mammo Breast Placement Device Initial Without Biopsy    Result Date: 3/19/2022  Successful needle localization of the known malignancy in the left upper outer quadrant.  Surgical excision will be performed by Dr. Reed.  FINAL PATHOLOGY: Invasive poorly differentiated ductal carcinoma. Gross tumor size 2.8 cm. 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 Positive margins for invasive carcinoma. Findings are concordant. The patient is being followed by [facility].  This report was finalized on 3/19/2022 11:26 AM by Dr. Svetlana Juárez MD.      Mammo Breast Placement Device Initial Without Biopsy    Result Date: 3/19/2022  Successful needle localization of the known right upper outer quadrant malignancy.  Surgical excision will be performed by Dr. Reed.  FINAL PATHOLOGY OF THE RIGHT BREAST: Invasive moderately differentiated ductal carcinoma. Gross tumor size 11 mm. Focal high-grade DCIS. Invasive carcinoma present at the deep/posterior surgical margin. The patient is being cared for by [facility].  This report was finalized on 3/19/2022 11:24 AM by Dr. Svetlana Juárez MD.      Mammo Diagnostic Digital Tomosynthesis Right With CAD    Result Date: 1/27/2022  Findings highly suggestive of malignancy at the 11:00 position of the right breast both mammographically and sonographically.   BI-RADS CATEGORY:  5, HIGHLY SUGGESTIVE OF MALIGNANCY   RECOMMENDATION:  Ultrasound-guided biopsy  CAD was utilized.     10G ELEVATION VACUUM-ASSISTED ULTRASOUND GUIDED CORE BIOPSY    History: Findings highly suggestive of malignancy at the right 11-11 30 position  Procedure:  "Written and verbal consent was obtained for ultrasound guided core biopsy of a 2 to 3 cm centimeter ill-defined spiculated area corresponding to MRI nonmass enhancement at the right 11-11 30 position \" Time out\" was observed to verify the patient's identity and correct location of the breast abnormality. The presence of the lesion was confirmed ultrasound and a lateral approach was chosen. The breast was prepped and draped in the usual sterile fashion and 10 mL 1% lidocaine with and without epinephrine was utilized for local anesthesia. A small skin incision was made with a scalpel and a 10-gauge needle with an overlying sheath attached to the core biopsy device was introduced into the breast under direct sonographic guidance. The needle was placed within to the mass. A total of 6 core samples were obtained. The specimens were placed in formalin and forwarded to the pathology department. A post biopsy marking clip was placed. Post biopsy mammographic images were obtained. The clip was noted to be in excellent position in the anterior aspect of the 2 adjacent areas of nonmass enhancement seen by MRI  Upon completion of the procedure, compression was applied to the biopsy site until all appreciable bleeding subsided and a sterile dressing was applied. Post biopsy instructions were reviewed with the patient by our clinical breast imaging staff. A written copy of these instructions was also given to the patient. The patient tolerated the procedure well and no immediate complications occurred.   SUMMARY: 10-gauge ultrasound guided and vacuum assisted core biopsy of a 2-3 cm right breast mass located at the 11-11 30 position.  A post biopsy marking clip was placed.  PATHOLOGY: Invasive ductal carcinoma ER negative, IL negative, HER-2/mary equivocal. Findings are concordant. The patient will be referred to back to [facility]  This report was finalized on 1/27/2022 11:40 AM by Dr. Svetlana Juárez MD.      US Guided " "Breast Biopsy With & Without Device initial    Result Date: 1/27/2022  Findings highly suggestive of malignancy at the 11:00 position of the right breast both mammographically and sonographically.   BI-RADS CATEGORY:  5, HIGHLY SUGGESTIVE OF MALIGNANCY   RECOMMENDATION:  Ultrasound-guided biopsy  CAD was utilized.     10G ELEVATION VACUUM-ASSISTED ULTRASOUND GUIDED CORE BIOPSY    History: Findings highly suggestive of malignancy at the right 11-11 30 position  Procedure: Written and verbal consent was obtained for ultrasound guided core biopsy of a 2 to 3 cm centimeter ill-defined spiculated area corresponding to MRI nonmass enhancement at the right 11-11 30 position \" Time out\" was observed to verify the patient's identity and correct location of the breast abnormality. The presence of the lesion was confirmed ultrasound and a lateral approach was chosen. The breast was prepped and draped in the usual sterile fashion and 10 mL 1% lidocaine with and without epinephrine was utilized for local anesthesia. A small skin incision was made with a scalpel and a 10-gauge needle with an overlying sheath attached to the core biopsy device was introduced into the breast under direct sonographic guidance. The needle was placed within to the mass. A total of 6 core samples were obtained. The specimens were placed in formalin and forwarded to the pathology department. A post biopsy marking clip was placed. Post biopsy mammographic images were obtained. The clip was noted to be in excellent position in the anterior aspect of the 2 adjacent areas of nonmass enhancement seen by MRI  Upon completion of the procedure, compression was applied to the biopsy site until all appreciable bleeding subsided and a sterile dressing was applied. Post biopsy instructions were reviewed with the patient by our clinical breast imaging staff. A written copy of these instructions was also given to the patient. The patient tolerated the procedure well " and no immediate complications occurred.   SUMMARY: 10-gauge ultrasound guided and vacuum assisted core biopsy of a 2-3 cm right breast mass located at the 11-11 30 position.  A post biopsy marking clip was placed.  PATHOLOGY: Invasive ductal carcinoma ER negative, UT negative, HER-2/mary equivocal. Findings are concordant. The patient will be referred to back to [facility]  This report was finalized on 1/27/2022 11:40 AM by Dr. Svetlana Juárez MD.      Final Diagnosis   1.  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  See template #1    2.  LEFT BREAST, NEEDLE LOCALIZED LUMPECTOMY:                 Invasive poorly differentiated ductal carcinoma (Auburn score 8/9)                 Gross tumor size 28 mm                Background high-grade ductal carcinoma in situ with involvement of papillomatous lesion                 Invasive ductal carcinoma less than 1 mm from superior margin and medial margin                 Invasive ductal carcinoma 2 mm from inferior margin                 Ductal carcinoma in situ extends to posterior margin                 Biopsy cavity identified.  See template #2    3.  LEFT BREAST, EXTENDED MEDIAL, SUPERIOR, AND DEEP MARGIN:                 Focal residual carcinoma measuring 3 mm near anterior aspect                New anterior margin width is 9 mm    INVASIVE BREAST CANCER STAGING TEMPLATE #1    TYPE OF SPECIMEN/PROCEDURE: Needle localized lumpectomy  SPECIMEN LATERALITY: Right  TUMOR SITE: 11:00  TUMOR SIZE: 11 mm  TUMOR FOCALITY: Single focus  HISTOLOGIC TYPE OF INVASIVE CARCINOMA: Ductal with apocrine morphology  GRADE: 2                 Tubule formation: 3                Mitotic activity: 1                Pleomorphism: 3  OVERALL SCORE: 7/9  DUCTAL CARCINOMA IN SITU EXTENT: Focal  TUMOR EXTENSION: Structures not present                Skin: N/A                Skin satellite nodule: N/A                Nipple: N/a                Skeletal muscle: N/A  SURGICAL MARGINS: Invasive carcinoma present at margin                Invasive carcinoma margins: Tumor present at margin                Distance from closest margin: 0 mm                Specific closest margin: Posterior                   DCIS margins: Uninvolved by DCIS                Distance from closest margin: 6 mm                Specific closest margin: Posterior    LYMPH NODE STATUS: None submitted                Number of lymph nodes with macrometastasis: N/A                Number of lymph nodes with micrometastasis: N/A                Number of lymph nodes with isolated tumor cells: N/A  TOTAL NUMBER OF LYMPH NODES EXAMINED: 0  NUMBER OF SENTINEL NODES EXAMINED: 0  EXTRANODAL EXTENSION OF TUMOR: N/A  SIZE OF LARGEST ENOCH METASTATIC DEPOSIT: N/A  VASCULAR/LYMPHATIC INVASION: N/A  DISTANT SITES INVOLVED: Not applicable  ESTROGEN RECEPTOR STATUS BY IHC METHOD: Negative per previous biopsy  PROGESTERONE RECEPTOR STATUS BY IHC METHOD: Negative per previous biopsy  HER-2/LAURA ONCOPROTEIN STATUS BY IHC METHOD: Equivocal (2+) per previous biopsy  HER-2/LAURA ONCOGENE STATUS BY IN SITU HYBRIDIZATION ANALYSIS: Not amplified  BIOPSY UNDER WHICH BREAST BIOMARKERS PERFORMED: SX   TREATMENT EFFECT (BREAST): No known presurgical therapy  TREATMENT EFFECT (LYMPH NODE): No known presurgical therapy  ADDITIONAL PATHOLOGIC FINDINGS: Fibrocystic change  OTHER STUDIES: Available upon request  AJCC PATHOLOGIC STAGE: (COMPLETED BY PATHOLOGIST, BASED ONLY ON TISSUE FINDINGS; MORE EXTENSIVE DISEASE MAY NOT BE KNOWN TO THE PATHOLOGIST)  pT = 1c  pN = x  pM = N/A      INVASIVE BREAST CANCER STAGING TEMPLATE  #2    TYPE OF SPECIMEN/PROCEDURE: Needle localized lumpectomy  SPECIMEN LATERALITY: Left  TUMOR SITE: 2:00  TUMOR SIZE: 28 mm  TUMOR FOCALITY: Single focus  HISTOLOGIC TYPE OF INVASIVE CARCINOMA: Ductal  ndGndRndAndDndEnd:nd nd2nd Tubule formation: 3                Mitotic activity: 2                Pleomorphism: 3  OVERALL SCORE: 8/9  DUCTAL CARCINOMA IN SITU EXTENT: 12 mm  TUMOR EXTENSION: Structures not present                Skin: N/A                Skin satellite nodule: N/A                Nipple: N/A                Skeletal muscle: N/A  SURGICAL MARGINS: Final margins uninvolved by in situ or invasive neoplasm                Invasive carcinoma margins: Extended margin uninvolved                Distance from closest margin: Extended margin 9 mm                Specific closest margin: Anterior                   DCIS margins: Uninvolved                Distance from closest margin: 12 mm                Specific closest margin: Posterior    LYMPH NODE STATUS: No lymph nodes submitted                Number of lymph nodes with macrometastasis: N/A                Number of lymph nodes with micrometastasis: N/A                Number of lymph nodes with isolated tumor cells: N/A  TOTAL NUMBER OF LYMPH NODES EXAMINED: 0  NUMBER OF SENTINEL NODES EXAMINED: 0  EXTRANODAL EXTENSION OF TUMOR: N/A  SIZE OF LARGEST ENOCH METASTATIC DEPOSIT: N/A  VASCULAR/LYMPHATIC INVASION: N/A  DISTANT SITES INVOLVED: N/A  ESTROGEN RECEPTOR STATUS BY IHC METHOD: Positive per previous biopsy  PROGESTERONE RECEPTOR STATUS BY IHC METHOD: Negative per previous biopsy  HER-2/LAURA ONCOPROTEIN STATUS BY IHC METHOD: Negative (0+) per previous biopsy   HER-2/LAURA ONCOGENE STATUS BY IN SITU HYBRIDIZATION ANALYSIS: Not performed  BIOPSY UNDER WHICH BREAST BIOMARKERS PERFORMED: [id number]  TREATMENT EFFECT (BREAST): No known presurgical therapy  TREATMENT EFFECT (LYMPH NODE): No known presurgical therapy  ADDITIONAL PATHOLOGIC FINDINGS: None significant  OTHER  STUDIES: Available upon request  AJCC PATHOLOGIC STAGE: (COMPLETED BY PATHOLOGIST, BASED ONLY ON TISSUE FINDINGS; MORE EXTENSIVE DISEASE MAY NOT BE KNOWN TO THE PATHOLOGIST)  pT = 2  pN = x  pM = not applicable            Assessment/Plan    1.  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  Plan to continue carboplatin and Taxol in the adjuvant setting.  I am going to  stop at 4 cycles rather than 6.  Her platelets are starting to get affected significantly.   She will subsequently receive radiotherapy and be placed on indefinite hormone blockade.    2.  Metastatic endometrial cancer.  She had a single focus of disease in her lung.  This was treated with CyberKnife.  She is getting adjuvant carboplatin and Taxol.  I plan to treat her with hormone blockade after this since she is ER positive on this.    3.  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  Not transfusion dependent as of yet.        Ruby Trevino MD  [facility] Hematology and Oncology    Return on: 07/27/22  Return in (Approximately): 3 weeks    Orders Placed This Encounter   Procedures   • Comprehensive metabolic panel   •    • Comprehensive metabolic panel   • Comprehensive metabolic panel   • CBC and Differential   • CBC and Differential   • CBC and Differential       7/6/2022

## 2022-07-13 ENCOUNTER — APPOINTMENT (OUTPATIENT)
Dept: ONCOLOGY | Facility: HOSPITAL | Age: 77
End: 2022-07-13

## 2022-07-13 ENCOUNTER — HOSPITAL ENCOUNTER (OUTPATIENT)
Dept: ONCOLOGY | Facility: HOSPITAL | Age: 77
Setting detail: INFUSION SERIES
Discharge: HOME | End: 2022-07-13

## 2022-07-13 VITALS
WEIGHT: 151 LBS | BODY MASS INDEX: 27.79 KG/M2 | DIASTOLIC BLOOD PRESSURE: 54 MMHG | HEIGHT: 62 IN | RESPIRATION RATE: 18 BRPM | TEMPERATURE: 97.8 F | HEART RATE: 60 BPM | SYSTOLIC BLOOD PRESSURE: 95 MMHG

## 2022-07-13 DIAGNOSIS — Z17.0 MALIGNANT NEOPLASM OF UPPER-OUTER QUADRANT OF BOTH BREASTS IN FEMALE, ESTROGEN RECEPTOR POSITIVE: ICD-10-CM

## 2022-07-13 DIAGNOSIS — C50.412 MALIGNANT NEOPLASM OF UPPER-OUTER QUADRANT OF BOTH BREASTS IN FEMALE, ESTROGEN RECEPTOR POSITIVE: ICD-10-CM

## 2022-07-13 DIAGNOSIS — R91.8 MASS OF LEFT LUNG: ICD-10-CM

## 2022-07-13 DIAGNOSIS — C54.1 ENDOMETRIAL CANCER: Primary | ICD-10-CM

## 2022-07-13 DIAGNOSIS — C50.411 MALIGNANT NEOPLASM OF UPPER-OUTER QUADRANT OF BOTH BREASTS IN FEMALE, ESTROGEN RECEPTOR POSITIVE: ICD-10-CM

## 2022-07-13 LAB
ALBUMIN SERPL-MCNC: 4 G/DL (ref 3.5–5.2)
ALBUMIN/GLOB SERPL: 1.4 G/DL
ALP SERPL-CCNC: 54 U/L (ref 39–117)
ALT SERPL W P-5'-P-CCNC: 18 U/L (ref 1–33)
ANION GAP SERPL CALCULATED.3IONS-SCNC: 8 MMOL/L (ref 5–15)
AST SERPL-CCNC: 22 U/L (ref 1–32)
BILIRUB SERPL-MCNC: 0.9 MG/DL (ref 0–1.2)
BUN SERPL-MCNC: 21 MG/DL (ref 8–23)
BUN/CREAT SERPL: 25.6 (ref 7–25)
CALCIUM SPEC-SCNC: 9.2 MG/DL (ref 8.6–10.5)
CANCER AG125 SERPL QL: 22 U/ML (ref 0–38.1)
CHLORIDE SERPL-SCNC: 98 MMOL/L (ref 98–107)
CO2 SERPL-SCNC: 25 MMOL/L (ref 22–29)
CREAT SERPL-MCNC: 0.82 MG/DL (ref 0.57–1)
EGFRCR SERPLBLD CKD-EPI 2021: 74.7 ML/MIN/1.73
ERYTHROCYTE [DISTWIDTH] IN BLOOD BY AUTOMATED COUNT: 19.7 % (ref 12.3–15.4)
GLOBULIN UR ELPH-MCNC: 2.8 GM/DL
GLUCOSE SERPL-MCNC: 131 MG/DL (ref 65–99)
HCT VFR BLD AUTO: 28.3 % (ref 34–46.6)
HGB BLD-MCNC: 8.9 G/DL (ref 12–15.9)
LYMPHOCYTES # BLD AUTO: 1.5 10*3/MM3 (ref 0.7–3.1)
LYMPHOCYTES NFR BLD AUTO: 34 % (ref 19.6–45.3)
MCH RBC QN AUTO: 29.1 PG (ref 26.6–33)
MCHC RBC AUTO-ENTMCNC: 31.6 G/DL (ref 31.5–35.7)
MCV RBC AUTO: 92.3 FL (ref 79–97)
MONOCYTES # BLD AUTO: 0.2 10*3/MM3 (ref 0.1–0.9)
MONOCYTES NFR BLD AUTO: 4.4 % (ref 5–12)
NEUTROPHILS NFR BLD AUTO: 2.8 10*3/MM3 (ref 1.7–7)
NEUTROPHILS NFR BLD AUTO: 61.6 % (ref 42.7–76)
PLATELET # BLD AUTO: 147 10*3/MM3 (ref 140–450)
PMV BLD AUTO: 5.9 FL (ref 6–12)
POTASSIUM SERPL-SCNC: 4.8 MMOL/L (ref 3.5–5.2)
PROT SERPL-MCNC: 6.8 G/DL (ref 6–8.5)
RBC # BLD AUTO: 3.07 10*6/MM3 (ref 3.77–5.28)
SODIUM SERPL-SCNC: 131 MMOL/L (ref 136–145)
WBC NRBC COR # BLD: 4.5 10*3/MM3 (ref 3.4–10.8)

## 2022-07-13 PROCEDURE — 25010000002 CARBOPLATIN PER 50 MG: Performed by: INTERNAL MEDICINE

## 2022-07-13 PROCEDURE — 86304 IMMUNOASSAY TUMOR CA 125: CPT | Performed by: INTERNAL MEDICINE

## 2022-07-13 PROCEDURE — 25010000002 PALONOSETRON 0.25 MG/5ML SOLUTION PREFILLED SYRINGE: Performed by: INTERNAL MEDICINE

## 2022-07-13 PROCEDURE — 96417 CHEMO IV INFUS EACH ADDL SEQ: CPT

## 2022-07-13 PROCEDURE — 80053 COMPREHEN METABOLIC PANEL: CPT | Performed by: INTERNAL MEDICINE

## 2022-07-13 PROCEDURE — 96375 TX/PRO/DX INJ NEW DRUG ADDON: CPT

## 2022-07-13 PROCEDURE — 25010000002 DEXAMETHASONE SODIUM PHOSPHATE 100 MG/10ML SOLUTION: Performed by: INTERNAL MEDICINE

## 2022-07-13 PROCEDURE — 25010000002 PACLITAXEL PER 1 MG: Performed by: INTERNAL MEDICINE

## 2022-07-13 PROCEDURE — 85025 COMPLETE CBC W/AUTO DIFF WBC: CPT | Performed by: INTERNAL MEDICINE

## 2022-07-13 PROCEDURE — 96413 CHEMO IV INFUSION 1 HR: CPT

## 2022-07-13 PROCEDURE — 25010000002 DIPHENHYDRAMINE PER 50 MG: Performed by: INTERNAL MEDICINE

## 2022-07-13 PROCEDURE — 25010000002 HEPARIN LOCK FLUSH PER 10 UNITS: Performed by: INTERNAL MEDICINE

## 2022-07-13 RX ORDER — PALONOSETRON 0.05 MG/ML
0.25 INJECTION, SOLUTION INTRAVENOUS ONCE
Status: COMPLETED | OUTPATIENT
Start: 2022-07-13 | End: 2022-07-13

## 2022-07-13 RX ORDER — SODIUM CHLORIDE 9 MG/ML
250 INJECTION, SOLUTION INTRAVENOUS ONCE
Status: COMPLETED | OUTPATIENT
Start: 2022-07-13 | End: 2022-07-13

## 2022-07-13 RX ORDER — SODIUM CHLORIDE 0.9 % (FLUSH) 0.9 %
10 SYRINGE (ML) INJECTION AS NEEDED
Status: CANCELLED | OUTPATIENT
Start: 2022-07-13

## 2022-07-13 RX ORDER — HEPARIN SODIUM (PORCINE) LOCK FLUSH IV SOLN 100 UNIT/ML 100 UNIT/ML
500 SOLUTION INTRAVENOUS AS NEEDED
Status: CANCELLED | OUTPATIENT
Start: 2022-07-13

## 2022-07-13 RX ORDER — SODIUM CHLORIDE 0.9 % (FLUSH) 0.9 %
10 SYRINGE (ML) INJECTION AS NEEDED
Status: DISCONTINUED | OUTPATIENT
Start: 2022-07-13 | End: 2022-07-14 | Stop reason: HOSPADM

## 2022-07-13 RX ORDER — FAMOTIDINE 10 MG/ML
20 INJECTION, SOLUTION INTRAVENOUS ONCE
Status: COMPLETED | OUTPATIENT
Start: 2022-07-13 | End: 2022-07-13

## 2022-07-13 RX ORDER — HEPARIN SODIUM (PORCINE) LOCK FLUSH IV SOLN 100 UNIT/ML 100 UNIT/ML
500 SOLUTION INTRAVENOUS AS NEEDED
Status: DISCONTINUED | OUTPATIENT
Start: 2022-07-13 | End: 2022-07-14 | Stop reason: HOSPADM

## 2022-07-13 RX ADMIN — HEPARIN SODIUM (PORCINE) LOCK FLUSH IV SOLN 100 UNIT/ML 500 UNITS: 100 SOLUTION at 15:00

## 2022-07-13 RX ADMIN — FAMOTIDINE 20 MG: 10 INJECTION, SOLUTION INTRAVENOUS at 12:39

## 2022-07-13 RX ADMIN — PALONOSETRON 0.25 MG: 0.25 INJECTION, SOLUTION INTRAVENOUS at 12:37

## 2022-07-13 RX ADMIN — DIPHENHYDRAMINE HYDROCHLORIDE 25 MG: 50 INJECTION INTRAMUSCULAR; INTRAVENOUS at 12:37

## 2022-07-13 RX ADMIN — DEXAMETHASONE SODIUM PHOSPHATE 12 MG: 10 INJECTION, SOLUTION INTRAMUSCULAR; INTRAVENOUS at 12:37

## 2022-07-13 RX ADMIN — SODIUM CHLORIDE 250 ML: 9 INJECTION, SOLUTION INTRAVENOUS at 12:28

## 2022-07-13 RX ADMIN — CARBOPLATIN 180 MG: 10 INJECTION, SOLUTION INTRAVENOUS at 14:28

## 2022-07-13 RX ADMIN — Medication 10 ML: at 15:00

## 2022-07-13 RX ADMIN — PACLITAXEL 105 MG: 6 INJECTION, SOLUTION INTRAVENOUS at 13:22

## 2022-07-20 ENCOUNTER — APPOINTMENT (OUTPATIENT)
Dept: ONCOLOGY | Facility: HOSPITAL | Age: 77
End: 2022-07-20

## 2022-07-20 ENCOUNTER — HOSPITAL ENCOUNTER (OUTPATIENT)
Dept: ONCOLOGY | Facility: HOSPITAL | Age: 77
Setting detail: INFUSION SERIES
Discharge: HOME | End: 2022-07-20

## 2022-07-20 VITALS
RESPIRATION RATE: 18 BRPM | HEIGHT: 62 IN | BODY MASS INDEX: 27.58 KG/M2 | HEART RATE: 74 BPM | DIASTOLIC BLOOD PRESSURE: 56 MMHG | WEIGHT: 149.9 LBS | TEMPERATURE: 97.8 F | SYSTOLIC BLOOD PRESSURE: 118 MMHG

## 2022-07-20 DIAGNOSIS — C50.412 MALIGNANT NEOPLASM OF UPPER-OUTER QUADRANT OF BOTH BREASTS IN FEMALE, ESTROGEN RECEPTOR POSITIVE: ICD-10-CM

## 2022-07-20 DIAGNOSIS — C54.1 ENDOMETRIAL CANCER: Primary | ICD-10-CM

## 2022-07-20 DIAGNOSIS — R91.8 MASS OF LEFT LUNG: ICD-10-CM

## 2022-07-20 DIAGNOSIS — C50.411 MALIGNANT NEOPLASM OF UPPER-OUTER QUADRANT OF BOTH BREASTS IN FEMALE, ESTROGEN RECEPTOR POSITIVE: ICD-10-CM

## 2022-07-20 DIAGNOSIS — Z17.0 MALIGNANT NEOPLASM OF UPPER-OUTER QUADRANT OF BOTH BREASTS IN FEMALE, ESTROGEN RECEPTOR POSITIVE: ICD-10-CM

## 2022-07-20 LAB
ALBUMIN SERPL-MCNC: 3.7 G/DL (ref 3.5–5.2)
ALBUMIN/GLOB SERPL: 1.3 G/DL
ALP SERPL-CCNC: 51 U/L (ref 39–117)
ALT SERPL W P-5'-P-CCNC: 16 U/L (ref 1–33)
ANION GAP SERPL CALCULATED.3IONS-SCNC: 8 MMOL/L (ref 5–15)
AST SERPL-CCNC: 19 U/L (ref 1–32)
BILIRUB SERPL-MCNC: 0.8 MG/DL (ref 0–1.2)
BUN SERPL-MCNC: 22 MG/DL (ref 8–23)
BUN/CREAT SERPL: 27.5 (ref 7–25)
CALCIUM SPEC-SCNC: 9 MG/DL (ref 8.6–10.5)
CHLORIDE SERPL-SCNC: 97 MMOL/L (ref 98–107)
CO2 SERPL-SCNC: 23 MMOL/L (ref 22–29)
CREAT SERPL-MCNC: 0.8 MG/DL (ref 0.57–1)
EGFRCR SERPLBLD CKD-EPI 2021: 76.9 ML/MIN/1.73
ERYTHROCYTE [DISTWIDTH] IN BLOOD BY AUTOMATED COUNT: 21.2 % (ref 12.3–15.4)
GLOBULIN UR ELPH-MCNC: 2.8 GM/DL
GLUCOSE SERPL-MCNC: 133 MG/DL (ref 65–99)
HCT VFR BLD AUTO: 23.1 % (ref 34–46.6)
HGB BLD-MCNC: 7.5 G/DL (ref 12–15.9)
LYMPHOCYTES # BLD AUTO: 0.8 10*3/MM3 (ref 0.7–3.1)
LYMPHOCYTES NFR BLD AUTO: 39.2 % (ref 19.6–45.3)
MCH RBC QN AUTO: 30 PG (ref 26.6–33)
MCHC RBC AUTO-ENTMCNC: 32.4 G/DL (ref 31.5–35.7)
MCV RBC AUTO: 92.6 FL (ref 79–97)
MONOCYTES # BLD AUTO: 0.1 10*3/MM3 (ref 0.1–0.9)
MONOCYTES NFR BLD AUTO: 5.1 % (ref 5–12)
NEUTROPHILS NFR BLD AUTO: 1.2 10*3/MM3 (ref 1.7–7)
NEUTROPHILS NFR BLD AUTO: 55.7 % (ref 42.7–76)
PLATELET # BLD AUTO: 51 10*3/MM3 (ref 140–450)
PMV BLD AUTO: 5.8 FL (ref 6–12)
POTASSIUM SERPL-SCNC: 3.9 MMOL/L (ref 3.5–5.2)
PROT SERPL-MCNC: 6.5 G/DL (ref 6–8.5)
RBC # BLD AUTO: 2.5 10*6/MM3 (ref 3.77–5.28)
SODIUM SERPL-SCNC: 128 MMOL/L (ref 136–145)
WBC NRBC COR # BLD: 2.1 10*3/MM3 (ref 3.4–10.8)

## 2022-07-20 PROCEDURE — 80053 COMPREHEN METABOLIC PANEL: CPT | Performed by: INTERNAL MEDICINE

## 2022-07-20 PROCEDURE — 36591 DRAW BLOOD OFF VENOUS DEVICE: CPT

## 2022-07-20 PROCEDURE — 85025 COMPLETE CBC W/AUTO DIFF WBC: CPT | Performed by: INTERNAL MEDICINE

## 2022-07-20 PROCEDURE — 25010000002 HEPARIN LOCK FLUSH PER 10 UNITS: Performed by: INTERNAL MEDICINE

## 2022-07-20 RX ORDER — SODIUM CHLORIDE 0.9 % (FLUSH) 0.9 %
10 SYRINGE (ML) INJECTION AS NEEDED
Status: CANCELLED | OUTPATIENT
Start: 2022-07-20

## 2022-07-20 RX ORDER — HEPARIN SODIUM (PORCINE) LOCK FLUSH IV SOLN 100 UNIT/ML 100 UNIT/ML
500 SOLUTION INTRAVENOUS AS NEEDED
Status: CANCELLED | OUTPATIENT
Start: 2022-07-20

## 2022-07-20 RX ORDER — SODIUM CHLORIDE 0.9 % (FLUSH) 0.9 %
10 SYRINGE (ML) INJECTION AS NEEDED
Status: DISCONTINUED | OUTPATIENT
Start: 2022-07-20 | End: 2022-07-21 | Stop reason: HOSPADM

## 2022-07-20 RX ORDER — HEPARIN SODIUM (PORCINE) LOCK FLUSH IV SOLN 100 UNIT/ML 100 UNIT/ML
500 SOLUTION INTRAVENOUS AS NEEDED
Status: DISCONTINUED | OUTPATIENT
Start: 2022-07-20 | End: 2022-07-21 | Stop reason: HOSPADM

## 2022-07-20 RX ADMIN — HEPARIN SODIUM (PORCINE) LOCK FLUSH IV SOLN 100 UNIT/ML 500 UNITS: 100 SOLUTION at 10:28

## 2022-07-20 RX ADMIN — Medication 10 ML: at 10:28

## 2022-07-20 NOTE — PROGRESS NOTES
Reviewed CBC wit  office. They are placing orders to hold tx for 1 week. Reviewed plan and labs with pt. Pt denies any SOA, heart palpitations, or increased HR re:HGB 7.5. Instructed pt if symptoms occur over the next week to call MD office. Pt verbalized understanding. Mercy Miller RN

## 2022-07-21 ENCOUNTER — HOSPITAL ENCOUNTER (OUTPATIENT)
Dept: RADIATION ONCOLOGY | Facility: HOSPITAL | Age: 77
Setting detail: RADIATION/ONCOLOGY SERIES
Discharge: HOME | End: 2022-07-21

## 2022-07-21 ENCOUNTER — OFFICE VISIT (OUTPATIENT)
Dept: RADIATION ONCOLOGY | Facility: HOSPITAL | Age: 77
End: 2022-07-21

## 2022-07-21 VITALS
RESPIRATION RATE: 22 BRPM | OXYGEN SATURATION: 98 % | HEART RATE: 77 BPM | TEMPERATURE: 97.7 F | DIASTOLIC BLOOD PRESSURE: 65 MMHG | SYSTOLIC BLOOD PRESSURE: 132 MMHG | HEIGHT: 63 IN | BODY MASS INDEX: 26.65 KG/M2 | WEIGHT: 150.4 LBS

## 2022-07-21 DIAGNOSIS — Z17.0 MALIGNANT NEOPLASM OF UPPER-OUTER QUADRANT OF BOTH BREASTS IN FEMALE, ESTROGEN RECEPTOR POSITIVE: ICD-10-CM

## 2022-07-21 DIAGNOSIS — C50.412 MALIGNANT NEOPLASM OF UPPER-OUTER QUADRANT OF BOTH BREASTS IN FEMALE, ESTROGEN RECEPTOR POSITIVE: ICD-10-CM

## 2022-07-21 DIAGNOSIS — C50.411 MALIGNANT NEOPLASM OF UPPER-OUTER QUADRANT OF BOTH BREASTS IN FEMALE, ESTROGEN RECEPTOR POSITIVE: ICD-10-CM

## 2022-07-21 PROCEDURE — G0463 HOSPITAL OUTPT CLINIC VISIT: HCPCS | Performed by: RADIOLOGY

## 2022-07-21 RX ORDER — NIFEDIPINE 30 MG/1
30 TABLET, FILM COATED, EXTENDED RELEASE ORAL EVERY MORNING
COMMUNITY
Start: 2022-06-15

## 2022-07-21 NOTE — PROGRESS NOTES
CONSULTATION NOTE    NAME:      Kenisha Jama  :                                                          1945  DATE OF CONSULTATION:                       22  REQUESTING PHYSICIAN:                   Saumya Reed MD  REASON FOR CONSULTATION:           Cancer Staging  Endometrial cancer (HCC)  Staging form: Corpus Uteri - Carcinoma, AJCC V7  - Pathologic: Stage IA (T1a, N0, cM0) - Signed by Deanna Trinidad APRN on 2016    Malignant neoplasm of upper-outer quadrant of both breasts in female, estrogen receptor positive (HCC)  Staging form: Breast, AJCC 8th Edition  - Pathologic: Stage IB (pT1c, pN0, cM0, G3, ER-, FL-, HER2-) - Signed by Ruby Trevino MD on 2022           BRIEF HISTORY:  Kenisha Jama  is a very pleasant 76 y.o. female  previously treated in our department for secondary malignant neoplasm of lung from endometrial cancer.  She underwent CyberKnife stereotactic body radiotherapy of 25 Gray in a single fractions completed in March.  Ms. Jama was found to have triple negative right-sided breast cancer and ER positive left-sided breast cancer.  She underwent bilateral lumpectomies by Dr. Saumya Reed on 3/16/2022.  CT scan of 2022 showed decrease in size of the left lower lobe pulmonary nodule abutting the fissure status post radiation therapy.  There were postsurgical changes involving the lateral left breast with seroma/hematoma measuring 4.9 x 4.2 cm.  The changes involving the right upper outer breast measuring 2.5 x 4.1 cm were evident.  The scan was negative for metastatic disease in the abdomen and pelvis.    The right breast 11 o'clock position had an invasive moderately differentiated ductal carcinoma Bellwood score 7/9, grade 2.  Tumor size was 11 mm.  There was focal high-grade DCIS.  Invasive carcinoma was present at the deep margin.  No lymph nodes were submitted.  The tumor was triple negative.  3/30/2022 she underwent reexcision  with no residual tumor noted.    The left breast had a 2.8 cm invasive poorly differentiated ductal carcinoma Isidro score 8/9, grade 3.  Invasive ductal carcinoma was less than 1 mm from the superior and medial margin and 2 mm from the inferior margin.  DCIS extended to the posterior margin.  Extended margins were taken and the new anterior margin was 9 mm.  No lymph nodes were submitted.  The tumor was ER positive VA and HER2 negative.    Ms. Jama is here to discuss postoperative radiation.  She has 1 more cycle of chemotherapy.       Age at menarche:  13  Age at menopause:  61  Hormone replacement therapy:  no  Personal history of breast cancer:  no  Family history of breast cancer:  no  Radiation to chest before age of 31:  no  Age of first live birth:  n/a    BMI:  Body mass index is 26.64 kg/m².      Social History     Substance and Sexual Activity   Alcohol Use Yes    Comment: Rare       No Known Allergies    Social History     Tobacco Use   • Smoking status: Never Smoker   • Smokeless tobacco: Never Used   Vaping Use   • Vaping Use: Never used   Substance Use Topics   • Alcohol use: Yes     Comment: Rare   • Drug use: No         Past Medical History:   Diagnosis Date   • Anemia    • Breast cancer (HCC)    • Cancer (HCC)     endometrial   • Coronary artery disease     1 cardiac stent   • Dyslipidemia    • Endometrial cancer (HCC)    • GERD (gastroesophageal reflux disease)    • Heart disease    • Hemorrhoids    • Hypertension    • Metastatic cancer (HCC)    • YEHUDA on CPAP    • Seasonal allergies        family history includes Colon cancer in her father; Diabetes in her mother; Heart disease in her father and mother; Skin cancer in her father.     Past Surgical History:   Procedure Laterality Date   • BREAST BIOPSY Left 11/2021    o/s u/s   • BREAST CYST EXCISION      x3. Benign   • BREAST LUMPECTOMY  03/16/2022   • COLONOSCOPY     • CORONARY ANGIOPLASTY WITH STENT PLACEMENT     • CYBERKNIFE  04/26/2022  "   LLL lung   • DIAGNOSTIC LAPAROSCOPY N/A 03/03/2022    Procedure: DIAGNOSTIC LAPAROSCOPY;  Surgeon: Elizabeth Pedroza MD;  Location: [facility];  Service: Gynecology Oncology;  Laterality: N/A;   • ENDOSCOPY     • HYSTERECTOMY          Review of Systems   Constitutional: Positive for unexpected weight change (10 # loss in 3 months with chemo).   Eyes: Positive for eye problems (dry).   Gastrointestinal: Positive for abdominal distention, constipation and diarrhea.   Neurological: Positive for dizziness.      Examination of the breasts reveals the right breast has a well-healed surgical incision at the 12 o'clock position away from the nipple areolar complex.  She has no masses or suspicious lesions the left breast has a well-healing surgical incision at the 1 o'clock position away from the nipple areolar complex.  There is an old scar between his incision and nipple.  She has no axillary adenopathy bilaterally.    Objective   VITAL SIGNS:   Vitals:    07/21/22 0927   BP: 132/65   Pulse: 77   Resp: 22   Temp: 97.7 °F (36.5 °C)   TempSrc: Tympanic   SpO2: 98%   Weight: 68.2 kg (150 lb 6.4 oz)   Height: 160 cm (63\")   PainSc: 0-No pain        KPS       80%    Physical Exam  Vitals reviewed.   Constitutional:       Appearance: Normal appearance. She is normal weight.   Cardiovascular:      Rate and Rhythm: Normal rate.   Pulmonary:      Effort: Pulmonary effort is normal.   Neurological:      Mental Status: She is alert.              The following portions of the patient's history were reviewed and updated as appropriate: allergies, current medications, past family history, past medical history, past social history, past surgical history and problem list.    Assessment      IMPRESSION:    has bilateral breast cancer.  The right breast had an 11 mm triple negative tumor.  The left breast had an ER positive tumor measuring 2.8 cm.      RECOMMENDATIONS: I recommend postoperative radiation bilaterally to complete " her breast conserving treatment.  The right breast was triple negative so I will treat that breast to 40-45 Gray in 15-25 fractions with a tumor bed boost of 10 Gy in 5 fractions.  The left breast I would like to treat as partial breast but it is over the 2 cm size criteria.  We will also treat this breast to 40-45 Gray in 15-25 treatments with tumor bed boost.  The pros and cons, risks and benefits of treatment were discussed and informed consent obtained.  An appointment was made for her to return to our department for treatment planning.  It was a pleasure to see Ms. Jama.           Helen Haley MD    Total time of patient care on day of service including time prior to, face to face with patient, and following visit spent in reviewing records, lab results, imaging studies, discussion with patient, and documentation/charting was > 45 minutes.    Errors in dictation may reflect use of voice recognition software and not all errors in transcription may have been detected prior to signing.

## 2022-07-27 ENCOUNTER — HOSPITAL ENCOUNTER (OUTPATIENT)
Dept: ONCOLOGY | Facility: HOSPITAL | Age: 77
Setting detail: INFUSION SERIES
Discharge: HOME | End: 2022-07-27

## 2022-07-27 ENCOUNTER — TELEPHONE (OUTPATIENT)
Dept: ONCOLOGY | Facility: CLINIC | Age: 77
End: 2022-07-27

## 2022-07-27 ENCOUNTER — OFFICE VISIT (OUTPATIENT)
Dept: ONCOLOGY | Facility: CLINIC | Age: 77
End: 2022-07-27

## 2022-07-27 ENCOUNTER — APPOINTMENT (OUTPATIENT)
Dept: ONCOLOGY | Facility: HOSPITAL | Age: 77
End: 2022-07-27

## 2022-07-27 VITALS
HEART RATE: 75 BPM | HEIGHT: 63 IN | SYSTOLIC BLOOD PRESSURE: 104 MMHG | WEIGHT: 150 LBS | RESPIRATION RATE: 18 BRPM | BODY MASS INDEX: 26.58 KG/M2 | DIASTOLIC BLOOD PRESSURE: 54 MMHG | OXYGEN SATURATION: 96 % | TEMPERATURE: 96.9 F

## 2022-07-27 DIAGNOSIS — Z17.0 MALIGNANT NEOPLASM OF UPPER-OUTER QUADRANT OF BOTH BREASTS IN FEMALE, ESTROGEN RECEPTOR POSITIVE: ICD-10-CM

## 2022-07-27 DIAGNOSIS — R91.8 MASS OF LEFT LUNG: ICD-10-CM

## 2022-07-27 DIAGNOSIS — C50.412 MALIGNANT NEOPLASM OF UPPER-OUTER QUADRANT OF BOTH BREASTS IN FEMALE, ESTROGEN RECEPTOR POSITIVE: ICD-10-CM

## 2022-07-27 DIAGNOSIS — C54.1 ENDOMETRIAL CANCER: Primary | ICD-10-CM

## 2022-07-27 DIAGNOSIS — C50.411 MALIGNANT NEOPLASM OF UPPER-OUTER QUADRANT OF BOTH BREASTS IN FEMALE, ESTROGEN RECEPTOR POSITIVE: ICD-10-CM

## 2022-07-27 LAB
ALBUMIN SERPL-MCNC: 3.8 G/DL (ref 3.5–5.2)
ALBUMIN/GLOB SERPL: 1.4 G/DL
ALP SERPL-CCNC: 55 U/L (ref 39–117)
ALT SERPL W P-5'-P-CCNC: 13 U/L (ref 1–33)
ANION GAP SERPL CALCULATED.3IONS-SCNC: 10 MMOL/L (ref 5–15)
AST SERPL-CCNC: 17 U/L (ref 1–32)
BILIRUB SERPL-MCNC: 0.7 MG/DL (ref 0–1.2)
BUN SERPL-MCNC: 21 MG/DL (ref 8–23)
BUN/CREAT SERPL: 27.3 (ref 7–25)
CALCIUM SPEC-SCNC: 9.2 MG/DL (ref 8.6–10.5)
CHLORIDE SERPL-SCNC: 98 MMOL/L (ref 98–107)
CO2 SERPL-SCNC: 23 MMOL/L (ref 22–29)
CREAT SERPL-MCNC: 0.77 MG/DL (ref 0.57–1)
EGFRCR SERPLBLD CKD-EPI 2021: 80.6 ML/MIN/1.73
ERYTHROCYTE [DISTWIDTH] IN BLOOD BY AUTOMATED COUNT: 23.8 % (ref 12.3–15.4)
GLOBULIN UR ELPH-MCNC: 2.8 GM/DL
GLUCOSE SERPL-MCNC: 134 MG/DL (ref 65–99)
HCT VFR BLD AUTO: 22.1 % (ref 34–46.6)
HGB BLD-MCNC: 7 G/DL (ref 12–15.9)
LYMPHOCYTES # BLD AUTO: 1 10*3/MM3 (ref 0.7–3.1)
LYMPHOCYTES NFR BLD AUTO: 40.3 % (ref 19.6–45.3)
MCH RBC QN AUTO: 30.1 PG (ref 26.6–33)
MCHC RBC AUTO-ENTMCNC: 31.5 G/DL (ref 31.5–35.7)
MCV RBC AUTO: 95.8 FL (ref 79–97)
MONOCYTES # BLD AUTO: 0.2 10*3/MM3 (ref 0.1–0.9)
MONOCYTES NFR BLD AUTO: 7.8 % (ref 5–12)
NEUTROPHILS NFR BLD AUTO: 1.3 10*3/MM3 (ref 1.7–7)
NEUTROPHILS NFR BLD AUTO: 51.9 % (ref 42.7–76)
PLATELET # BLD AUTO: 22 10*3/MM3 (ref 140–450)
PMV BLD AUTO: 6.8 FL (ref 6–12)
POTASSIUM SERPL-SCNC: 4.4 MMOL/L (ref 3.5–5.2)
PROT SERPL-MCNC: 6.6 G/DL (ref 6–8.5)
RBC # BLD AUTO: 2.31 10*6/MM3 (ref 3.77–5.28)
SODIUM SERPL-SCNC: 131 MMOL/L (ref 136–145)
WBC NRBC COR # BLD: 2.6 10*3/MM3 (ref 3.4–10.8)

## 2022-07-27 PROCEDURE — 85025 COMPLETE CBC W/AUTO DIFF WBC: CPT | Performed by: INTERNAL MEDICINE

## 2022-07-27 PROCEDURE — 99214 OFFICE O/P EST MOD 30 MIN: CPT | Performed by: INTERNAL MEDICINE

## 2022-07-27 PROCEDURE — 25010000002 HEPARIN LOCK FLUSH PER 10 UNITS: Performed by: INTERNAL MEDICINE

## 2022-07-27 PROCEDURE — 36591 DRAW BLOOD OFF VENOUS DEVICE: CPT

## 2022-07-27 PROCEDURE — 80053 COMPREHEN METABOLIC PANEL: CPT | Performed by: INTERNAL MEDICINE

## 2022-07-27 RX ORDER — HEPARIN SODIUM (PORCINE) LOCK FLUSH IV SOLN 100 UNIT/ML 100 UNIT/ML
500 SOLUTION INTRAVENOUS AS NEEDED
Status: DISCONTINUED | OUTPATIENT
Start: 2022-07-27 | End: 2022-07-28 | Stop reason: HOSPADM

## 2022-07-27 RX ORDER — HEPARIN SODIUM (PORCINE) LOCK FLUSH IV SOLN 100 UNIT/ML 100 UNIT/ML
500 SOLUTION INTRAVENOUS AS NEEDED
Status: CANCELLED | OUTPATIENT
Start: 2022-07-27

## 2022-07-27 RX ORDER — LETROZOLE 2.5 MG/1
2.5 TABLET, FILM COATED ORAL DAILY
Qty: 90 TABLET | Refills: 3 | Status: SHIPPED | OUTPATIENT
Start: 2022-07-27 | End: 2022-10-25

## 2022-07-27 RX ORDER — LETROZOLE 2.5 MG/1
2.5 TABLET, FILM COATED ORAL DAILY
COMMUNITY
Start: 2022-07-22 | End: 2023-01-17

## 2022-07-27 RX ORDER — SODIUM CHLORIDE 0.9 % (FLUSH) 0.9 %
10 SYRINGE (ML) INJECTION AS NEEDED
Status: DISCONTINUED | OUTPATIENT
Start: 2022-07-27 | End: 2022-07-28 | Stop reason: HOSPADM

## 2022-07-27 RX ORDER — SODIUM CHLORIDE 0.9 % (FLUSH) 0.9 %
10 SYRINGE (ML) INJECTION AS NEEDED
OUTPATIENT
Start: 2022-07-27

## 2022-07-27 RX ADMIN — HEPARIN 500 UNITS: 100 SYRINGE at 10:23

## 2022-07-27 RX ADMIN — Medication 10 ML: at 10:23

## 2022-07-27 NOTE — PROGRESS NOTES
PROBLEM LIST:  Oncology/Hematology History   Endometrial cancer (HCC)   9/30/2011 Imaging    TVUS and CT scan for palpable left pelvic mass upon annual exam and new abdominal symptoms.      10/6/2011 Surgery    ADY/BSO with pelvic lymph node dissection. Stage 1A grade 2 endometrial adenocarcinoma by final pathology     1/26/2022 Imaging    IMPRESSION:     Hypermetabolic soft tissue nodule in the left breast compatible with  biopsy-proven invasive ductal carcinoma. There is diffuse low level FDG  uptake in the region of recent right breast biopsy site. A  hypermetabolic mass in the left lower lobe is suspicious for pulmonary  metastasis. No additional sites of metastases are identified. There is  no evidence of discrete/focal hypermetabolic lesion of the sternum to  correspond with the indeterminant sternal lesion described on breast MRI  exam.     2/14/2022 Biopsy    Final Diagnosis   LEFT LUNG, BIOPSY:  Metastatic adenocarcinoma.  See comment.  GJK   Electronically signed by Wolfgang Hair MD on 2/22/2022 at 1154   Comment    Sections show small fragments of tumor that are morphologically distinct from the patient's known breast cancers.  Immunoprofile suggests mullerian origin.  Clinical correlation required.  This case was shown for intradepartmental consultation and the other pathologist concurs with the findings interpretation.  This case was discussed with Dr. Trevino on 02/21/22.  This case was discussed with Dr. Reed on 02/22/22.        5/4/2022 - 7/13/2022 Chemotherapy    OP GYN PACLitaxel / CARBOplatin AUC=2 (Weekly)     5/4/2022 -  Chemotherapy    OP CENTRAL VENOUS ACCESS DEVICE ACCESS, CARE, AND MAINTENANCE ([facility])     Malignant neoplasm of upper-outer quadrant of both breasts in female, estrogen receptor positive (HCC)   1/21/2022 Initial Diagnosis    Malignant neoplasm of upper-outer quadrant of both breasts in female, estrogen receptor positive (HCC)     1/24/2022 Biopsy    1.  Right breast  cancer-invasive ductal carcinoma grade 2-ER negative MI negative HER-2 negative    2.  Left breast cancer-invasive ductal carcinoma grade 2-ER positive MI negative HER-2 negative     1/26/2022 Imaging    EXAMINATION: NM PET/CT SKULL BASE TO MID THIGH      FINDINGS:      Today's 3-D images demonstrate focal hypermetabolism in the soft tissues  of the left breast as well as focal hypermetabolism within the left  midlung.     Multiplanar images of the head and neck demonstrate symmetric FDG uptake  within the brain. There is no evidence of hypermetabolic neck mass or  lymph node.     In the upper outer quadrant of the left breast there is redemonstration  of a irregular 2.1 cm soft tissue nodule with FDG hypermetabolism of SUV  max 4.2, compatible with biopsy-proven invasive ductal carcinoma. There  is an ill-defined diffuse region of low-level hypermetabolism in the  right breast with adjacent biopsy clip and single locule of soft tissue  air, compatible with recent right breast biopsy. A discrete  hypermetabolic nodule or mass in the right breast is not confidently  identified. There is no hypermetabolic or enlarged axillary lymph nodes.     Within the chest there is redemonstration of a lobulated soft tissue  mass in the superior aspect of the left lower lobe which appears  hypermetabolic with SUV max 6.5. There is no evidence of hypermetabolic  mediastinal or hilar adenopathy.     Below the diaphragm there is expected physiologic uptake within the   and GI tracts. No evidence of abdominal pelvic malignancy or metastasis.  No hypermetabolic abdominopelvic lymph nodes. Photopenic left renal cyst  is noted.     There is no hypermetabolic pathologic marrow process. Specifically,  there is no evidence of hypermetabolic lesion of the sternum to  correspond with the indeterminant sternal lesion detailed on prior  breast MRI exam. FDG uptake at the left antecubital fossa reflects site  of IV administration.      IMPRESSION:     Hypermetabolic soft tissue nodule in the left breast compatible with  biopsy-proven invasive ductal carcinoma. There is diffuse low level FDG  uptake in the region of recent right breast biopsy site. A  hypermetabolic mass in the left lower lobe is suspicious for pulmonary  metastasis. No additional sites of metastases are identified. There is  no evidence of discrete/focal hypermetabolic lesion of the sternum to  correspond with the indeterminant sternal lesion described on breast MRI  exam.           4/20/2022 Cancer Staged    Staging form: Breast, AJCC 8th Edition  - Pathologic: Stage IB (pT1c, pN0, cM0, G3, ER-, IN-, HER2-) - Signed by Ruby Trevino MD on 4/20/2022 5/4/2022 -  Chemotherapy    OP CENTRAL VENOUS ACCESS DEVICE ACCESS, CARE, AND MAINTENANCE ([facility])     Malignant neoplasm metastatic to left lung (HCC) (Resolved)   3/23/2022 Initial Diagnosis    Malignant neoplasm metastatic to left lung (HCC) (Resolved)     4/26/2022 - 4/26/2022 Radiation    Radiation OncologyTreatment Course:  Kenisha Jama received 2500 cGy in 1 fraction to left lung metastasis via Stereotactic Radiation Therapy - SRT.         REASON FOR VISIT: Management of my cancers     HISTORY OF PRESENT ILLNESS:   76 y.o.  female presents today for follow-up.  She is currently on carboplatin and Taxol weekly.  We are treating her adjuvantly for both triple negative breast cancer and endometrial cancer.  She had CyberKnife to the endometrial cancer focus in the lung.  She tolerated her weekly carboplatin and Taxol.  However she has become significantly more cytopenic and has become anemic and severely thrombocytopenic.  I was planning to treat her with only 4 cycles.  She is completed 3 cycles so far and received 1 dose of fourth cycle.    Past medical history, social history and family history was reviewed 07/27/22 and unchanged from prior visit.    Review of Systems:    Review of Systems   Constitutional:  Positive for fatigue.   HENT:  Negative.    Eyes: Negative.    Respiratory: Negative.    Cardiovascular: Negative.    Gastrointestinal: Negative.    Endocrine: Negative.    Genitourinary: Negative.     Musculoskeletal: Negative.    Skin: Negative.    Neurological: Negative.    Hematological: Negative.    Psychiatric/Behavioral: Negative.             Medications:        Current Outpatient Medications:   •  acetaminophen (Tylenol 8 Hour) 650 MG 8 hr tablet, Take 1 tablet by mouth Every 8 (Eight) Hours As Needed for Mild Pain ., Disp: 40 tablet, Rfl: 0  •  atorvastatin (LIPITOR) 20 MG tablet, Take 20 mg by mouth Every Evening., Disp: , Rfl:   •  carvedilol (COREG) 12.5 MG tablet, Take 12.5 mg by mouth 2 (Two) Times a Day With Meals., Disp: , Rfl:   •  ezetimibe (ZETIA) 10 MG tablet, Take 10 mg by mouth Every Night., Disp: , Rfl:   •  ferrous sulfate 325 (65 FE) MG tablet, Take 325 mg by mouth Daily With Breakfast., Disp: , Rfl:   •  ibuprofen (ADVIL,MOTRIN) 600 MG tablet, Take 1 tablet by mouth Every 6 (Six) Hours As Needed for Mild Pain ., Disp: 15 tablet, Rfl: 0  •  ketorolac (ACULAR) 0.5 % ophthalmic solution, , Disp: , Rfl:   •  letrozole (FEMARA) 2.5 MG tablet, Take 2.5 mg by mouth Daily., Disp: , Rfl:   •  letrozole (Femara) 2.5 MG tablet, Take 1 tablet by mouth Daily for 90 days., Disp: 90 tablet, Rfl: 3  •  lidocaine-prilocaine (EMLA) 2.5-2.5 % cream, Apply 1 application topically to the appropriate area as directed As Needed for Injection Site Pain. Apply 45-60 minutes before port access, cover with plastic wrap after application., Disp: 5 g, Rfl: 5  •  lisinopril (PRINIVIL,ZESTRIL) 40 MG tablet, Take 40 mg by mouth Daily., Disp: , Rfl:   •  mupirocin (BACTROBAN) 2 % ointment, APPLY TOPICALLY TO THE AFFECTED AREA(S) THREE TIMES DAILY FOR 7 DAYS, Disp: , Rfl:   •  NIFEdipine CC (ADALAT CC) 30 MG 24 hr tablet, Take 30 mg by mouth Every Morning., Disp: , Rfl:   •  NIFEdipine XL (PROCARDIA XL) 30 MG 24 hr tablet,  "Take 30 mg by mouth Daily., Disp: , Rfl:   •  pantoprazole (PROTONIX) 40 MG EC tablet, , Disp: , Rfl:   •  pravastatin (PRAVACHOL) 80 MG tablet, , Disp: , Rfl: 1  •  ursodiol (ACTIGALL) 500 MG tablet, , Disp: , Rfl:     Current Facility-Administered Medications:   •  lidocaine (XYLOCAINE) 1 % injection 5 mL, 5 mL, Infiltration, Once, Svetlana Juárez MD    Facility-Administered Medications Ordered in Other Visits:   •  heparin injection 500 Units, 500 Units, Intravenous, PRN, Ruby Trevino MD, 500 Units at 07/27/22 1023  •  sodium chloride 0.9 % flush 10 mL, 10 mL, Intravenous, PRN, Ruby Trevino MD, 10 mL at 07/27/22 1023    Pain Medications             acetaminophen (Tylenol 8 Hour) 650 MG 8 hr tablet Take 1 tablet by mouth Every 8 (Eight) Hours As Needed for Mild Pain .    ibuprofen (ADVIL,MOTRIN) 600 MG tablet Take 1 tablet by mouth Every 6 (Six) Hours As Needed for Mild Pain .             ALLERGIES:  No Known Allergies      Physical Exam    VITAL SIGNS:  /54   Pulse 75   Temp 96.9 °F (36.1 °C) (Temporal)   Resp 18   Ht 160 cm (62.99\")   Wt 68 kg (150 lb)   SpO2 96%   BMI 26.58 kg/m²     ECOG score: 0           Wt Readings from Last 3 Encounters:   07/27/22 68 kg (150 lb)   07/21/22 68.2 kg (150 lb 6.4 oz)   07/20/22 68 kg (149 lb 14.4 oz)       Body mass index is 26.58 kg/m². Body surface area is 1.71 meters squared.    Pain Score    07/27/22 0930   PainSc: 0-No pain           Performance Status: 0    General: well appearing, in no acute distress  HEENT: sclera anicteric, neck is supple  Lymphatics: no cervical, supraclavicular, or axillary adenopathy  Cardiovascular: regular rate and rhythm, no murmurs, rubs or gallops  Lungs: clear to auscultation bilaterally  Abdomen: soft, nontender, nondistended.  No palpable organomegaly  Extremities: no lower extremity edema  Skin: no rashes, lesions, bruising, or petechiae  Msk:  Shows no weakness of the large muscle groups  Psych: Mood is " stable        RECENT LABS:    Lab Results   Component Value Date    HGB 7.0 (L) 07/27/2022    HCT 22.1 (L) 07/27/2022    MCV 95.8 07/27/2022    PLT 22 (C) 07/27/2022    WBC 2.60 (L) 07/27/2022    NEUTROABS 1.30 (L) 07/27/2022    LYMPHSABS 1.00 07/27/2022    MONOSABS 0.20 07/27/2022    EOSABS 0.01 07/06/2022    BASOSABS 0.04 07/06/2022       Lab Results   Component Value Date    GLUCOSE 134 (H) 07/27/2022    BUN 21 07/27/2022    CREATININE 0.77 07/27/2022     (L) 07/27/2022    K 4.4 07/27/2022    CL 98 07/27/2022    CO2 23.0 07/27/2022    CALCIUM 9.2 07/27/2022    PROTEINTOT 6.6 07/27/2022    ALBUMIN 3.80 07/27/2022    BILITOT 0.7 07/27/2022    ALKPHOS 55 07/27/2022    AST 17 07/27/2022    ALT 13 07/27/2022       CT Chest With Contrast Diagnostic    Result Date: 1/10/2022  CT demonstrates no specific osseous correlate to the sternal lesion noted on recent MRI. No corresponding lytic or sclerotic osseous lesion is present. There is no evidence of cortical breakthrough.  An irregular lobulated homogeneous 3.7 x 2.1 cm nodule is noted along the fissure the left lung base. Findings remain suspicious for metastatic involvement, however granulomatous process or possibly pulmonary AVM could have similar appearance. PET/CT would be useful to characterize but this finding and further evaluate for possible CT occult sternal osseous metastasis.  Redemonstration of bilateral soft tissue breast masses concerning for malignancy, better characterized on recent MRI.  This report was finalized on 1/10/2022 3:34 PM by Rodríguez Bacon.      IR Fluoro Guidance Only for Central Line    Result Date: 3/16/2022  10 seconds intraoperative fluoroscopy time during port placement performed by Dr. Reed. Details the procedure can be found in Dr. Reed's note.  This report was finalized on 3/16/2022 2:10 PM by Shemar Ulrich MD.      XR Chest 1 View    Result Date: 3/16/2022  No pneumothorax status post port placement  Atelectasis in the  "lower left lung  This report was finalized on 3/16/2022 1:22 PM by Pepito Leblanc.      XR Chest 1 View    Result Date: 2/14/2022  No pneumothorax.  This report was finalized on 2/14/2022 2:29 PM by Rodríguez Bacon.      XR Chest 1 View    Result Date: 2/14/2022  Left lower lobe pulmonary nodule faintly seen. There is no pneumothorax or other evident immediate complication. Unchanged heart and mediastinal contours.  This report was finalized on 2/14/2022 1:06 PM by Rodríguez Bacon.      CT Needle Biopsy Lung    Result Date: 2/16/2022  Impression:                                                              Successful CT guided core biopsy of a mass in the left lobe of the lung as described above.  Thank you for the opportunity to assist in the care of your patient.  This report was finalized on 2/16/2022 8:39 AM by Geovani Wilkins MD.      Mammo Post Clip Placement Right    Result Date: 1/27/2022  Findings highly suggestive of malignancy at the 11:00 position of the right breast both mammographically and sonographically.   BI-RADS CATEGORY:  5, HIGHLY SUGGESTIVE OF MALIGNANCY   RECOMMENDATION:  Ultrasound-guided biopsy  CAD was utilized.     10G ELEVATION VACUUM-ASSISTED ULTRASOUND GUIDED CORE BIOPSY    History: Findings highly suggestive of malignancy at the right 11-11 30 position  Procedure: Written and verbal consent was obtained for ultrasound guided core biopsy of a 2 to 3 cm centimeter ill-defined spiculated area corresponding to MRI nonmass enhancement at the right 11-11 30 position \" Time out\" was observed to verify the patient's identity and correct location of the breast abnormality. The presence of the lesion was confirmed ultrasound and a lateral approach was chosen. The breast was prepped and draped in the usual sterile fashion and 10 mL 1% lidocaine with and without epinephrine was utilized for local anesthesia. A small skin incision was made with a scalpel and a 10-gauge needle with an overlying " sheath attached to the core biopsy device was introduced into the breast under direct sonographic guidance. The needle was placed within to the mass. A total of 6 core samples were obtained. The specimens were placed in formalin and forwarded to the pathology department. A post biopsy marking clip was placed. Post biopsy mammographic images were obtained. The clip was noted to be in excellent position in the anterior aspect of the 2 adjacent areas of nonmass enhancement seen by MRI  Upon completion of the procedure, compression was applied to the biopsy site until all appreciable bleeding subsided and a sterile dressing was applied. Post biopsy instructions were reviewed with the patient by our clinical breast imaging staff. A written copy of these instructions was also given to the patient. The patient tolerated the procedure well and no immediate complications occurred.   SUMMARY: 10-gauge ultrasound guided and vacuum assisted core biopsy of a 2-3 cm right breast mass located at the 11-11 30 position.  A post biopsy marking clip was placed.  PATHOLOGY: Invasive ductal carcinoma ER negative, ND negative, HER-2/mary equivocal. Findings are concordant. The patient will be referred to back to [facility]  This report was finalized on 1/27/2022 11:40 AM by Dr. Svetlana Juárez MD.      MRI Breast Bilateral Diagnostic With & Without Contrast    Result Date: 12/30/2021  1. Known biopsy-proven malignancy on the left 2. Findings highly suggestive of malignancy on the right as described 3. Indeterminate sternal lesion measuring almost 2 cm with questionable expansion/erosion of the anterior sternal cortex. Dedicated bone imaging is recommended.  RECOMMENDATIONS: 1. Diagnostic right mammography and sonography for evaluation for biopsy of the adjacent areas of concern in the right upper outer quadrant. 2. Dedicated bone evaluation of the sternal lesion.  BI-RADS CATEGORY: 5, HIGHLY SUGGESTIVE OF MALIGNANCY  FINAL MRI  RESULTS AND RECOMMENDATIONS WILL BE CALLED TO THE PATIENT BY A BREAST CARE NURSE.  This report was finalized on 12/30/2021 11:48 AM by Dr. Svetlana Juárez MD.      NM PET/CT Skull Base to Mid Thigh    Result Date: 1/26/2022   Hypermetabolic soft tissue nodule in the left breast compatible with biopsy-proven invasive ductal carcinoma. There is diffuse low level FDG uptake in the region of recent right breast biopsy site. A hypermetabolic mass in the left lower lobe is suspicious for pulmonary metastasis. No additional sites of metastases are identified. There is no evidence of discrete/focal hypermetabolic lesion of the sternum to correspond with the indeterminant sternal lesion described on breast MRI exam.   This report was finalized on 1/26/2022 3:33 PM by Anil Saha MD.      Mammo Breast Placement Device Initial Without Biopsy    Result Date: 3/19/2022  Successful needle localization of the known malignancy in the left upper outer quadrant.  Surgical excision will be performed by Dr. Reed.  FINAL PATHOLOGY: Invasive poorly differentiated ductal carcinoma. Gross tumor size 2.8 cm. 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 Positive margins for invasive carcinoma. Findings are concordant. The patient is being followed by [facility].  This report was finalized on 3/19/2022 11:26 AM by Dr. Svetlana Juárez MD.      Mammo Breast Placement Device Initial Without Biopsy    Result Date: 3/19/2022  Successful needle localization of the known right upper outer quadrant malignancy.  Surgical excision will be performed by Dr. Reed.  FINAL PATHOLOGY OF THE RIGHT BREAST: Invasive moderately differentiated ductal carcinoma. Gross tumor size 11 mm. Focal high-grade DCIS. Invasive carcinoma present at the deep/posterior surgical margin. The patient is being cared for by [facility].  This report was finalized on 3/19/2022 11:24 AM by Dr. Svetlana Juárez MD.      Mammo Diagnostic Digital  "Tomosynthesis Right With CAD    Result Date: 1/27/2022  Findings highly suggestive of malignancy at the 11:00 position of the right breast both mammographically and sonographically.   BI-RADS CATEGORY:  5, HIGHLY SUGGESTIVE OF MALIGNANCY   RECOMMENDATION:  Ultrasound-guided biopsy  CAD was utilized.     10G ELEVATION VACUUM-ASSISTED ULTRASOUND GUIDED CORE BIOPSY    History: Findings highly suggestive of malignancy at the right 11-11 30 position  Procedure: Written and verbal consent was obtained for ultrasound guided core biopsy of a 2 to 3 cm centimeter ill-defined spiculated area corresponding to MRI nonmass enhancement at the right 11-11 30 position \" Time out\" was observed to verify the patient's identity and correct location of the breast abnormality. The presence of the lesion was confirmed ultrasound and a lateral approach was chosen. The breast was prepped and draped in the usual sterile fashion and 10 mL 1% lidocaine with and without epinephrine was utilized for local anesthesia. A small skin incision was made with a scalpel and a 10-gauge needle with an overlying sheath attached to the core biopsy device was introduced into the breast under direct sonographic guidance. The needle was placed within to the mass. A total of 6 core samples were obtained. The specimens were placed in formalin and forwarded to the pathology department. A post biopsy marking clip was placed. Post biopsy mammographic images were obtained. The clip was noted to be in excellent position in the anterior aspect of the 2 adjacent areas of nonmass enhancement seen by MRI  Upon completion of the procedure, compression was applied to the biopsy site until all appreciable bleeding subsided and a sterile dressing was applied. Post biopsy instructions were reviewed with the patient by our clinical breast imaging staff. A written copy of these instructions was also given to the patient. The patient tolerated the procedure well and no " "immediate complications occurred.   SUMMARY: 10-gauge ultrasound guided and vacuum assisted core biopsy of a 2-3 cm right breast mass located at the 11-11 30 position.  A post biopsy marking clip was placed.  PATHOLOGY: Invasive ductal carcinoma ER negative, [place] negative, HER-2/mary equivocal. Findings are concordant. The patient will be referred to back to [facility]  This report was finalized on 1/27/2022 11:40 AM by Dr. Svetlana Juárez MD.      US Guided Breast Biopsy With & Without Device initial    Result Date: 1/27/2022  Findings highly suggestive of malignancy at the 11:00 position of the right breast both mammographically and sonographically.   BI-RADS CATEGORY:  5, HIGHLY SUGGESTIVE OF MALIGNANCY   RECOMMENDATION:  Ultrasound-guided biopsy  CAD was utilized.     10G ELEVATION VACUUM-ASSISTED ULTRASOUND GUIDED CORE BIOPSY    History: Findings highly suggestive of malignancy at the right 11-11 30 position  Procedure: Written and verbal consent was obtained for ultrasound guided core biopsy of a 2 to 3 cm centimeter ill-defined spiculated area corresponding to MRI nonmass enhancement at the right 11-11 30 position \" Time out\" was observed to verify the patient's identity and correct location of the breast abnormality. The presence of the lesion was confirmed ultrasound and a lateral approach was chosen. The breast was prepped and draped in the usual sterile fashion and 10 mL 1% lidocaine with and without epinephrine was utilized for local anesthesia. A small skin incision was made with a scalpel and a 10-gauge needle with an overlying sheath attached to the core biopsy device was introduced into the breast under direct sonographic guidance. The needle was placed within to the mass. A total of 6 core samples were obtained. The specimens were placed in formalin and forwarded to the pathology department. A post biopsy marking clip was placed. Post biopsy mammographic images were obtained. The clip was noted " to be in excellent position in the anterior aspect of the 2 adjacent areas of nonmass enhancement seen by MRI  Upon completion of the procedure, compression was applied to the biopsy site until all appreciable bleeding subsided and a sterile dressing was applied. Post biopsy instructions were reviewed with the patient by our clinical breast imaging staff. A written copy of these instructions was also given to the patient. The patient tolerated the procedure well and no immediate complications occurred.   SUMMARY: 10-gauge ultrasound guided and vacuum assisted core biopsy of a 2-3 cm right breast mass located at the 11-11 30 position.  A post biopsy marking clip was placed.  PATHOLOGY: Invasive ductal carcinoma ER negative, HI negative, HER-2/mary equivocal. Findings are concordant. The patient will be referred to back to [facility]  This report was finalized on 1/27/2022 11:40 AM by Dr. Svetlana Juárez MD.      Final Diagnosis   1.  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  See template #1    2.  LEFT BREAST, NEEDLE LOCALIZED LUMPECTOMY:                 Invasive poorly differentiated ductal carcinoma (Winchester score 8/9)                 Gross tumor size 28 mm                Background high-grade ductal carcinoma in situ with involvement of papillomatous lesion                 Invasive ductal carcinoma less than 1 mm from superior margin and medial margin                 Invasive ductal carcinoma 2 mm from inferior margin                 Ductal carcinoma in situ extends to posterior margin                 Biopsy cavity identified.  See template #2    3.  LEFT  BREAST, EXTENDED MEDIAL, SUPERIOR, AND DEEP MARGIN:                 Focal residual carcinoma measuring 3 mm near anterior aspect                New anterior margin width is 9 mm    INVASIVE BREAST CANCER STAGING TEMPLATE #1    TYPE OF SPECIMEN/PROCEDURE: Needle localized lumpectomy  SPECIMEN LATERALITY: Right  TUMOR SITE: 11:00  TUMOR SIZE: 11 mm  TUMOR FOCALITY: Single focus  HISTOLOGIC TYPE OF INVASIVE CARCINOMA: Ductal with apocrine morphology  stGstRstAstDstEst:st st1st Tubule formation: 3                Mitotic activity: 1                Pleomorphism: 3  OVERALL SCORE: 7/9  DUCTAL CARCINOMA IN SITU EXTENT: Focal  TUMOR EXTENSION: Structures not present                Skin: N/A                Skin satellite nodule: N/A                Nipple: N/a                Skeletal muscle: N/A  SURGICAL MARGINS: Invasive carcinoma present at margin                Invasive carcinoma margins: Tumor present at margin                Distance from closest margin: 0 mm                Specific closest margin: Posterior                   DCIS margins: Uninvolved by DCIS                Distance from closest margin: 6 mm                Specific closest margin: Posterior    LYMPH NODE STATUS: None submitted                Number of lymph nodes with macrometastasis: N/A                Number of lymph nodes with micrometastasis: N/A                Number of lymph nodes with isolated tumor cells: N/A  TOTAL NUMBER OF LYMPH NODES EXAMINED: 0  NUMBER OF SENTINEL NODES EXAMINED: 0  EXTRANODAL EXTENSION OF TUMOR: N/A  SIZE OF LARGEST ENOCH METASTATIC DEPOSIT: N/A  VASCULAR/LYMPHATIC INVASION: N/A  DISTANT SITES INVOLVED: Not applicable  ESTROGEN RECEPTOR STATUS BY IHC METHOD: Negative per previous biopsy  PROGESTERONE RECEPTOR STATUS BY IHC METHOD: Negative per previous biopsy  HER-2/LAURA ONCOPROTEIN STATUS BY IHC METHOD: Equivocal (2+) per previous biopsy  HER-2/LAURA ONCOGENE STATUS BY IN SITU HYBRIDIZATION ANALYSIS: Not amplified  BIOPSY UNDER  WHICH BREAST BIOMARKERS PERFORMED: SX   TREATMENT EFFECT (BREAST): No known presurgical therapy  TREATMENT EFFECT (LYMPH NODE): No known presurgical therapy  ADDITIONAL PATHOLOGIC FINDINGS: Fibrocystic change  OTHER STUDIES: Available upon request  AJCC PATHOLOGIC STAGE: (COMPLETED BY PATHOLOGIST, BASED ONLY ON TISSUE FINDINGS; MORE EXTENSIVE DISEASE MAY NOT BE KNOWN TO THE PATHOLOGIST)  pT = 1c  pN = x  pM = N/A      INVASIVE BREAST CANCER STAGING TEMPLATE #2    TYPE OF SPECIMEN/PROCEDURE: Needle localized lumpectomy  SPECIMEN LATERALITY: Left  TUMOR SITE: 2:00  TUMOR SIZE: 28 mm  TUMOR FOCALITY: Single focus  HISTOLOGIC TYPE OF INVASIVE CARCINOMA: Ductal  thGthRthAthDthEth:th th4th Tubule formation: 3                Mitotic activity: 2                Pleomorphism: 3  OVERALL SCORE: 8/9  DUCTAL CARCINOMA IN SITU EXTENT: 12 mm  TUMOR EXTENSION: Structures not present                Skin: N/A                Skin satellite nodule: N/A                Nipple: N/A                Skeletal muscle: N/A  SURGICAL MARGINS: Final margins uninvolved by in situ or invasive neoplasm                Invasive carcinoma margins: Extended margin uninvolved                Distance from closest margin: Extended margin 9 mm                Specific closest margin: Anterior                   DCIS margins: Uninvolved                Distance from closest margin: 12 mm                Specific closest margin: Posterior    LYMPH NODE STATUS: No lymph nodes submitted                Number of lymph nodes with macrometastasis: N/A                Number of lymph nodes with micrometastasis: N/A                Number of lymph nodes with isolated tumor cells: N/A  TOTAL NUMBER OF LYMPH NODES EXAMINED: 0  NUMBER OF SENTINEL NODES EXAMINED: 0  EXTRANODAL EXTENSION OF TUMOR: N/A  SIZE OF LARGEST ENOCH METASTATIC DEPOSIT: N/A  VASCULAR/LYMPHATIC INVASION: N/A  DISTANT SITES INVOLVED: N/A  ESTROGEN RECEPTOR STATUS BY IHC METHOD: Positive per previous  biopsy  PROGESTERONE RECEPTOR STATUS BY IHC METHOD: Negative per previous biopsy  HER-2/LAURA ONCOPROTEIN STATUS BY IHC METHOD: Negative (0+) per previous biopsy   HER-2/LAURA ONCOGENE STATUS BY IN SITU HYBRIDIZATION ANALYSIS: Not performed  BIOPSY UNDER WHICH BREAST BIOMARKERS PERFORMED: [id number]  TREATMENT EFFECT (BREAST): No known presurgical therapy  TREATMENT EFFECT (LYMPH NODE): No known presurgical therapy  ADDITIONAL PATHOLOGIC FINDINGS: None significant  OTHER STUDIES: Available upon request  AJCC PATHOLOGIC STAGE: (COMPLETED BY PATHOLOGIST, BASED ONLY ON TISSUE FINDINGS; MORE EXTENSIVE DISEASE MAY NOT BE KNOWN TO THE PATHOLOGIST)  pT = 2  pN = x  pM = not applicable            Assessment/Plan    1.  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  Due to severe thrombocytopenia I am going to discontinue adjuvant carboplatin and Taxol.  I think she has had adequate number of cycles with both drugs and she has become more cytopenic.  This should recover over time.  My plan is to refer her for adjuvant radiotherapy in [place].  She reports travels to be a problem coming back and forth here.      2.  Metastatic endometrial cancer.  She had a single focus of disease in her lung.  This was treated with CyberKnife.  I plan to keep her on Femara for now.  She is estrogen positive biopsies.    3.  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  This should recover now that I have stopped her chemo.      Ruby Trevino MD  [facility]    Return on: 08/15/22  Return in (Approximately): 1 month    Orders Placed This Encounter   Procedures   • Ambulatory Referral to Radiation Oncology   • CBC & Differential   • CBC & Differential       7/27/2022

## 2022-07-27 NOTE — TELEPHONE ENCOUNTER
Critical Test Results      MD: Mio    Date: 7/27/2022     Critical test result:  Plt. 22    Time results received:  9:45      Mio notified at 9:50am and he is discontinuing chemo

## 2022-08-15 ENCOUNTER — OFFICE VISIT (OUTPATIENT)
Dept: ONCOLOGY | Facility: CLINIC | Age: 77
End: 2022-08-15

## 2022-08-15 ENCOUNTER — LAB (OUTPATIENT)
Dept: LAB | Facility: HOSPITAL | Age: 77
End: 2022-08-15

## 2022-08-15 ENCOUNTER — TELEPHONE (OUTPATIENT)
Dept: ONCOLOGY | Facility: CLINIC | Age: 77
End: 2022-08-15

## 2022-08-15 VITALS
HEIGHT: 63 IN | RESPIRATION RATE: 20 BRPM | SYSTOLIC BLOOD PRESSURE: 100 MMHG | WEIGHT: 149 LBS | BODY MASS INDEX: 26.4 KG/M2 | TEMPERATURE: 97.3 F | HEART RATE: 76 BPM | OXYGEN SATURATION: 98 % | DIASTOLIC BLOOD PRESSURE: 62 MMHG

## 2022-08-15 DIAGNOSIS — C54.1 ENDOMETRIAL CANCER: ICD-10-CM

## 2022-08-15 DIAGNOSIS — Z17.0 MALIGNANT NEOPLASM OF UPPER-OUTER QUADRANT OF BOTH BREASTS IN FEMALE, ESTROGEN RECEPTOR POSITIVE: ICD-10-CM

## 2022-08-15 DIAGNOSIS — C50.412 MALIGNANT NEOPLASM OF UPPER-OUTER QUADRANT OF BOTH BREASTS IN FEMALE, ESTROGEN RECEPTOR POSITIVE: ICD-10-CM

## 2022-08-15 DIAGNOSIS — C54.1 ENDOMETRIAL CANCER: Primary | ICD-10-CM

## 2022-08-15 DIAGNOSIS — C50.411 MALIGNANT NEOPLASM OF UPPER-OUTER QUADRANT OF BOTH BREASTS IN FEMALE, ESTROGEN RECEPTOR POSITIVE: ICD-10-CM

## 2022-08-15 LAB
ERYTHROCYTE [DISTWIDTH] IN BLOOD BY AUTOMATED COUNT: 23.7 % (ref 12.3–15.4)
HCT VFR BLD AUTO: 27 % (ref 34–46.6)
HGB BLD-MCNC: 8.4 G/DL (ref 12–15.9)
LYMPHOCYTES # BLD AUTO: 1.4 10*3/MM3 (ref 0.7–3.1)
LYMPHOCYTES NFR BLD AUTO: 29.4 % (ref 19.6–45.3)
MCH RBC QN AUTO: 31.8 PG (ref 26.6–33)
MCHC RBC AUTO-ENTMCNC: 31.2 G/DL (ref 31.5–35.7)
MCV RBC AUTO: 102.1 FL (ref 79–97)
MONOCYTES # BLD AUTO: 0.4 10*3/MM3 (ref 0.1–0.9)
MONOCYTES NFR BLD AUTO: 8 % (ref 5–12)
NEUTROPHILS NFR BLD AUTO: 3 10*3/MM3 (ref 1.7–7)
NEUTROPHILS NFR BLD AUTO: 62.6 % (ref 42.7–76)
PLATELET # BLD AUTO: 232 10*3/MM3 (ref 140–450)
PMV BLD AUTO: 5.4 FL (ref 6–12)
RBC # BLD AUTO: 2.65 10*6/MM3 (ref 3.77–5.28)
WBC NRBC COR # BLD: 4.8 10*3/MM3 (ref 3.4–10.8)

## 2022-08-15 PROCEDURE — 99214 OFFICE O/P EST MOD 30 MIN: CPT | Performed by: INTERNAL MEDICINE

## 2022-08-15 PROCEDURE — 36415 COLL VENOUS BLD VENIPUNCTURE: CPT

## 2022-08-15 PROCEDURE — 85025 COMPLETE CBC W/AUTO DIFF WBC: CPT

## 2022-08-15 NOTE — TELEPHONE ENCOUNTER
WHILE IN OFFICE TODAY PATIENT WANTED TO KNOW IF A PORT FLUSH ON 08/31 WAS NECESSARY OR IF THIS COULD WAIT UNTIL October VISIT. PLEASE CONTACT PATIENT WITH DETAILS. PLEASE ADVISE.

## 2022-08-15 NOTE — TELEPHONE ENCOUNTER
Called patient informing her that Dr. Lieberman is fine with patient to port flushed in Oct. Informing patient nurse left message for patient's appointment for Friday to be cancelled and to reschedule for Oct same day as she follows up with Dr. Lieberman. Patient stating she understood plan.

## 2022-08-15 NOTE — PROGRESS NOTES
PROBLEM LIST:  Oncology/Hematology History   Endometrial cancer (HCC)   9/30/2011 Imaging    TVUS and CT scan for palpable left pelvic mass upon annual exam and new abdominal symptoms.      10/6/2011 Surgery    ADY/BSO with pelvic lymph node dissection. Stage 1A grade 2 endometrial adenocarcinoma by final pathology     1/26/2022 Imaging    IMPRESSION:     Hypermetabolic soft tissue nodule in the left breast compatible with  biopsy-proven invasive ductal carcinoma. There is diffuse low level FDG  uptake in the region of recent right breast biopsy site. A  hypermetabolic mass in the left lower lobe is suspicious for pulmonary  metastasis. No additional sites of metastases are identified. There is  no evidence of discrete/focal hypermetabolic lesion of the sternum to  correspond with the indeterminant sternal lesion described on breast MRI  exam.     2/14/2022 Biopsy    Final Diagnosis   LEFT LUNG, BIOPSY:  Metastatic adenocarcinoma.  See comment.  GJK   Electronically signed by Wolfgang Hair MD on 2/22/2022 at 1154   Comment    Sections show small fragments of tumor that are morphologically distinct from the patient's known breast cancers.  Immunoprofile suggests mullerian origin.  Clinical correlation required.  This case was shown for intradepartmental consultation and the other pathologist concurs with the findings interpretation.  This case was discussed with Dr. Trevino on 02/21/22.  This case was discussed with Dr. Reed on 02/22/22.        5/4/2022 - 7/13/2022 Chemotherapy    OP GYN PACLitaxel / CARBOplatin AUC=2 (Weekly)     5/4/2022 -  Chemotherapy    OP CENTRAL VENOUS ACCESS DEVICE ACCESS, CARE, AND MAINTENANCE (CVAD)     Malignant neoplasm of upper-outer quadrant of both breasts in female, estrogen receptor positive (HCC)   1/21/2022 Initial Diagnosis    Malignant neoplasm of upper-outer quadrant of both breasts in female, estrogen receptor positive (HCC)     1/24/2022 Biopsy    1.  Right breast  cancer-invasive ductal carcinoma grade 2-ER negative MT negative HER-2 negative    2.  Left breast cancer-invasive ductal carcinoma grade 2-ER positive MT negative HER-2 negative     1/26/2022 Imaging    EXAMINATION: NM PET/CT SKULL BASE TO MID THIGH      FINDINGS:      Today's 3-D images demonstrate focal hypermetabolism in the soft tissues  of the left breast as well as focal hypermetabolism within the left  midlung.     Multiplanar images of the head and neck demonstrate symmetric FDG uptake  within the brain. There is no evidence of hypermetabolic neck mass or  lymph node.     In the upper outer quadrant of the left breast there is redemonstration  of a irregular 2.1 cm soft tissue nodule with FDG hypermetabolism of SUV  max 4.2, compatible with biopsy-proven invasive ductal carcinoma. There  is an ill-defined diffuse region of low-level hypermetabolism in the  right breast with adjacent biopsy clip and single locule of soft tissue  air, compatible with recent right breast biopsy. A discrete  hypermetabolic nodule or mass in the right breast is not confidently  identified. There is no hypermetabolic or enlarged axillary lymph nodes.     Within the chest there is redemonstration of a lobulated soft tissue  mass in the superior aspect of the left lower lobe which appears  hypermetabolic with SUV max 6.5. There is no evidence of hypermetabolic  mediastinal or hilar adenopathy.     Below the diaphragm there is expected physiologic uptake within the   and GI tracts. No evidence of abdominal pelvic malignancy or metastasis.  No hypermetabolic abdominopelvic lymph nodes. Photopenic left renal cyst  is noted.     There is no hypermetabolic pathologic marrow process. Specifically,  there is no evidence of hypermetabolic lesion of the sternum to  correspond with the indeterminant sternal lesion detailed on prior  breast MRI exam. FDG uptake at the left antecubital fossa reflects site  of IV administration.      IMPRESSION:     Hypermetabolic soft tissue nodule in the left breast compatible with  biopsy-proven invasive ductal carcinoma. There is diffuse low level FDG  uptake in the region of recent right breast biopsy site. A  hypermetabolic mass in the left lower lobe is suspicious for pulmonary  metastasis. No additional sites of metastases are identified. There is  no evidence of discrete/focal hypermetabolic lesion of the sternum to  correspond with the indeterminant sternal lesion described on breast MRI  exam.           4/20/2022 Cancer Staged    Staging form: Breast, AJCC 8th Edition  - Pathologic: Stage IB (pT1c, pN0, cM0, G3, ER-, AR-, HER2-) - Signed by Ruby Trevino MD on 4/20/2022 5/4/2022 -  Chemotherapy    OP CENTRAL VENOUS ACCESS DEVICE ACCESS, CARE, AND MAINTENANCE (CVAD)     Malignant neoplasm metastatic to left lung (HCC) (Resolved)   3/23/2022 Initial Diagnosis    Malignant neoplasm metastatic to left lung (HCC) (Resolved)     4/26/2022 - 4/26/2022 Radiation    Radiation OncologyTreatment Course:  Kenisha Jama received 2500 cGy in 1 fraction to left lung metastasis via Stereotactic Radiation Therapy - SRT.         REASON FOR VISIT: Management of my cancers     HISTORY OF PRESENT ILLNESS:   76 y.o.  female presents today for follow-up.  She completed carboplatin and Taxol adjuvantly for her bilateral breast cancer.  Her counts did not tolerate her treatment well.  Subsequently I sent her to [place] for radiotherapy and she is undergoing radiotherapy for now.  She also underwent CyberKnife to the solitary lesion in the lung from endometrial cancer.  She is actually feeling much better since stopping chemotherapy.  Denies any issues with nausea vomiting no diarrhea.  No significant weight loss.    Past medical history, social history and family history was reviewed 08/15/22 and unchanged from prior visit.    Review of Systems:    Review of Systems   Constitutional: Positive for fatigue.    HENT:  Negative.    Eyes: Negative.    Respiratory: Negative.    Cardiovascular: Negative.    Gastrointestinal: Negative.    Endocrine: Negative.    Genitourinary: Negative.     Musculoskeletal: Negative.    Skin: Negative.    Neurological: Negative.    Hematological: Negative.    Psychiatric/Behavioral: Negative.             Medications:        Current Outpatient Medications:   •  acetaminophen (Tylenol 8 Hour) 650 MG 8 hr tablet, Take 1 tablet by mouth Every 8 (Eight) Hours As Needed for Mild Pain ., Disp: 40 tablet, Rfl: 0  •  atorvastatin (LIPITOR) 20 MG tablet, Take 20 mg by mouth Every Evening., Disp: , Rfl:   •  carvedilol (COREG) 12.5 MG tablet, Take 12.5 mg by mouth 2 (Two) Times a Day With Meals., Disp: , Rfl:   •  ezetimibe (ZETIA) 10 MG tablet, Take 10 mg by mouth Every Night., Disp: , Rfl:   •  ferrous sulfate 325 (65 FE) MG tablet, Take 325 mg by mouth Daily With Breakfast., Disp: , Rfl:   •  ibuprofen (ADVIL,MOTRIN) 600 MG tablet, Take 1 tablet by mouth Every 6 (Six) Hours As Needed for Mild Pain ., Disp: 15 tablet, Rfl: 0  •  ketorolac (ACULAR) 0.5 % ophthalmic solution, , Disp: , Rfl:   •  letrozole (FEMARA) 2.5 MG tablet, Take 2.5 mg by mouth Daily., Disp: , Rfl:   •  letrozole (Femara) 2.5 MG tablet, Take 1 tablet by mouth Daily for 90 days., Disp: 90 tablet, Rfl: 3  •  lidocaine-prilocaine (EMLA) 2.5-2.5 % cream, Apply 1 application topically to the appropriate area as directed As Needed for Injection Site Pain. Apply 45-60 minutes before port access, cover with plastic wrap after application., Disp: 5 g, Rfl: 5  •  lisinopril (PRINIVIL,ZESTRIL) 40 MG tablet, Take 40 mg by mouth Daily., Disp: , Rfl:   •  mupirocin (BACTROBAN) 2 % ointment, APPLY TOPICALLY TO THE AFFECTED AREA(S) THREE TIMES DAILY FOR 7 DAYS, Disp: , Rfl:   •  NIFEdipine CC (ADALAT CC) 30 MG 24 hr tablet, Take 30 mg by mouth Every Morning., Disp: , Rfl:   •  NIFEdipine XL (PROCARDIA XL) 30 MG 24 hr tablet, Take 30 mg by mouth  "Daily., Disp: , Rfl:   •  pantoprazole (PROTONIX) 40 MG EC tablet, , Disp: , Rfl:   •  pravastatin (PRAVACHOL) 80 MG tablet, , Disp: , Rfl: 1  •  ursodiol (ACTIGALL) 500 MG tablet, , Disp: , Rfl:     Current Facility-Administered Medications:   •  lidocaine (XYLOCAINE) 1 % injection 5 mL, 5 mL, Infiltration, Once, Svetlana Juárez MD    Pain Medications             acetaminophen (Tylenol 8 Hour) 650 MG 8 hr tablet Take 1 tablet by mouth Every 8 (Eight) Hours As Needed for Mild Pain .    ibuprofen (ADVIL,MOTRIN) 600 MG tablet Take 1 tablet by mouth Every 6 (Six) Hours As Needed for Mild Pain .             ALLERGIES:  No Known Allergies      Physical Exam    VITAL SIGNS:  /62 Comment: LUE  Pulse 76   Temp 97.3 °F (36.3 °C) (Infrared)   Resp 20   Ht 160 cm (63\")   Wt 67.6 kg (149 lb)   SpO2 98% Comment: RA  BMI 26.39 kg/m²     ECOG score: 0           Wt Readings from Last 3 Encounters:   08/15/22 67.6 kg (149 lb)   07/27/22 68 kg (150 lb)   07/21/22 68.2 kg (150 lb 6.4 oz)       Body mass index is 26.39 kg/m². Body surface area is 1.71 meters squared.    Pain Score    08/15/22 1114   PainSc: 0-No pain           Performance Status: 0    General: well appearing, in no acute distress  HEENT: sclera anicteric, neck is supple  Lymphatics: no cervical, supraclavicular, or axillary adenopathy  Cardiovascular: regular rate and rhythm, no murmurs, rubs or gallops  Lungs: clear to auscultation bilaterally  Abdomen: soft, nontender, nondistended.  No palpable organomegaly  Extremities: no lower extremity edema  Skin: no rashes, lesions, bruising, or petechiae  Msk:  Shows no weakness of the large muscle groups  Psych: Mood is stable        RECENT LABS:    Lab Results   Component Value Date    HGB 7.0 (L) 07/27/2022    HCT 22.1 (L) 07/27/2022    MCV 95.8 07/27/2022    PLT 22 (C) 07/27/2022    WBC 2.60 (L) 07/27/2022    NEUTROABS 1.30 (L) 07/27/2022    LYMPHSABS 1.00 07/27/2022    MONOSABS 0.20 07/27/2022    EOSABS " 0.01 07/06/2022    BASOSABS 0.04 07/06/2022       Lab Results   Component Value Date    GLUCOSE 134 (H) 07/27/2022    BUN 21 07/27/2022    CREATININE 0.77 07/27/2022     (L) 07/27/2022    K 4.4 07/27/2022    CL 98 07/27/2022    CO2 23.0 07/27/2022    CALCIUM 9.2 07/27/2022    PROTEINTOT 6.6 07/27/2022    ALBUMIN 3.80 07/27/2022    BILITOT 0.7 07/27/2022    ALKPHOS 55 07/27/2022    AST 17 07/27/2022    ALT 13 07/27/2022       CT Chest With Contrast Diagnostic    Result Date: 1/10/2022  CT demonstrates no specific osseous correlate to the sternal lesion noted on recent MRI. No corresponding lytic or sclerotic osseous lesion is present. There is no evidence of cortical breakthrough.  An irregular lobulated homogeneous 3.7 x 2.1 cm nodule is noted along the fissure the left lung base. Findings remain suspicious for metastatic involvement, however granulomatous process or possibly pulmonary AVM could have similar appearance. PET/CT would be useful to characterize but this finding and further evaluate for possible CT occult sternal osseous metastasis.  Redemonstration of bilateral soft tissue breast masses concerning for malignancy, better characterized on recent MRI.  This report was finalized on 1/10/2022 3:34 PM by Rodríguez Bacon.      IR Fluoro Guidance Only for Central Line    Result Date: 3/16/2022  10 seconds intraoperative fluoroscopy time during port placement performed by Dr. Reed. Details the procedure can be found in Dr. Reed's note.  This report was finalized on 3/16/2022 2:10 PM by Shemar Ulrich MD.      XR Chest 1 View    Result Date: 3/16/2022  No pneumothorax status post port placement  Atelectasis in the lower left lung  This report was finalized on 3/16/2022 1:22 PM by Pepito Leblanc.      XR Chest 1 View    Result Date: 2/14/2022  No pneumothorax.  This report was finalized on 2/14/2022 2:29 PM by Rodríguez Bacon.      XR Chest 1 View    Result Date: 2/14/2022  Left lower lobe pulmonary nodule  "faintly seen. There is no pneumothorax or other evident immediate complication. Unchanged heart and mediastinal contours.  This report was finalized on 2/14/2022 1:06 PM by Rodríguez Bacon.      CT Needle Biopsy Lung    Result Date: 2/16/2022  Impression:                                                              Successful CT guided core biopsy of a mass in the left lobe of the lung as described above.  Thank you for the opportunity to assist in the care of your patient.  This report was finalized on 2/16/2022 8:39 AM by Geovani Wilkins MD.      Mammo Post Clip Placement Right    Result Date: 1/27/2022  Findings highly suggestive of malignancy at the 11:00 position of the right breast both mammographically and sonographically.   BI-RADS CATEGORY:  5, HIGHLY SUGGESTIVE OF MALIGNANCY   RECOMMENDATION:  Ultrasound-guided biopsy  CAD was utilized.     10G ELEVATION VACUUM-ASSISTED ULTRASOUND GUIDED CORE BIOPSY    History: Findings highly suggestive of malignancy at the right 11-11 30 position  Procedure: Written and verbal consent was obtained for ultrasound guided core biopsy of a 2 to 3 cm centimeter ill-defined spiculated area corresponding to MRI nonmass enhancement at the right 11-11 30 position \" Time out\" was observed to verify the patient's identity and correct location of the breast abnormality. The presence of the lesion was confirmed ultrasound and a lateral approach was chosen. The breast was prepped and draped in the usual sterile fashion and 10 mL 1% lidocaine with and without epinephrine was utilized for local anesthesia. A small skin incision was made with a scalpel and a 10-gauge needle with an overlying sheath attached to the core biopsy device was introduced into the breast under direct sonographic guidance. The needle was placed within to the mass. A total of 6 core samples were obtained. The specimens were placed in formalin and forwarded to the pathology department. A post biopsy marking clip " was placed. Post biopsy mammographic images were obtained. The clip was noted to be in excellent position in the anterior aspect of the 2 adjacent areas of nonmass enhancement seen by MRI  Upon completion of the procedure, compression was applied to the biopsy site until all appreciable bleeding subsided and a sterile dressing was applied. Post biopsy instructions were reviewed with the patient by our clinical breast imaging staff. A written copy of these instructions was also given to the patient. The patient tolerated the procedure well and no immediate complications occurred.   SUMMARY: 10-gauge ultrasound guided and vacuum assisted core biopsy of a 2-3 cm right breast mass located at the 11-11 30 position.  A post biopsy marking clip was placed.  PATHOLOGY: Invasive ductal carcinoma ER negative, VT negative, HER-2/mary equivocal. Findings are concordant. The patient will be referred to back to [facility]  This report was finalized on 1/27/2022 11:40 AM by Dr. Svetlana Juárez MD.      MRI Breast Bilateral Diagnostic With & Without Contrast    Result Date: 12/30/2021  1. Known biopsy-proven malignancy on the left 2. Findings highly suggestive of malignancy on the right as described 3. Indeterminate sternal lesion measuring almost 2 cm with questionable expansion/erosion of the anterior sternal cortex. Dedicated bone imaging is recommended.  RECOMMENDATIONS: 1. Diagnostic right mammography and sonography for evaluation for biopsy of the adjacent areas of concern in the right upper outer quadrant. 2. Dedicated bone evaluation of the sternal lesion.  BI-RADS CATEGORY: 5, HIGHLY SUGGESTIVE OF MALIGNANCY  FINAL MRI RESULTS AND RECOMMENDATIONS WILL BE CALLED TO THE PATIENT BY A BREAST CARE NURSE.  This report was finalized on 12/30/2021 11:48 AM by Dr. Svetlana Juárez MD.      NM PET/CT Skull Base to Mid Thigh    Result Date: 1/26/2022   Hypermetabolic soft tissue nodule in the left breast compatible with  biopsy-proven invasive ductal carcinoma. There is diffuse low level FDG uptake in the region of recent right breast biopsy site. A hypermetabolic mass in the left lower lobe is suspicious for pulmonary metastasis. No additional sites of metastases are identified. There is no evidence of discrete/focal hypermetabolic lesion of the sternum to correspond with the indeterminant sternal lesion described on breast MRI exam.   This report was finalized on 1/26/2022 3:33 PM by Anil Saha MD.      Mammo Breast Placement Device Initial Without Biopsy    Result Date: 3/19/2022  Successful needle localization of the known malignancy in the left upper outer quadrant.  Surgical excision will be performed by Dr. Reed.  FINAL PATHOLOGY: Invasive poorly differentiated ductal carcinoma. Gross tumor size 2.8 cm. 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 Positive margins for invasive carcinoma. Findings are concordant. The patient is being followed by [facility].  This report was finalized on 3/19/2022 11:26 AM by Dr. Svetlana Juárez MD.      Mammo Breast Placement Device Initial Without Biopsy    Result Date: 3/19/2022  Successful needle localization of the known right upper outer quadrant malignancy.  Surgical excision will be performed by Dr. Reed.  FINAL PATHOLOGY OF THE RIGHT BREAST: Invasive moderately differentiated ductal carcinoma. Gross tumor size 11 mm. Focal high-grade DCIS. Invasive carcinoma present at the deep/posterior surgical margin. The patient is being cared for by [facility].  This report was finalized on 3/19/2022 11:24 AM by Dr. Svetlana Juárez MD.      Mammo Diagnostic Digital Tomosynthesis Right With CAD    Result Date: 1/27/2022  Findings highly suggestive of malignancy at the 11:00 position of the right breast both mammographically and sonographically.   BI-RADS CATEGORY:  5, HIGHLY SUGGESTIVE OF MALIGNANCY   RECOMMENDATION:  Ultrasound-guided biopsy  CAD was  "utilized.     10G ELEVATION VACUUM-ASSISTED ULTRASOUND GUIDED CORE BIOPSY    History: Findings highly suggestive of malignancy at the right 11-11 30 position  Procedure: Written and verbal consent was obtained for ultrasound guided core biopsy of a 2 to 3 cm centimeter ill-defined spiculated area corresponding to MRI nonmass enhancement at the right 11-11 30 position \" Time out\" was observed to verify the patient's identity and correct location of the breast abnormality. The presence of the lesion was confirmed ultrasound and a lateral approach was chosen. The breast was prepped and draped in the usual sterile fashion and 10 mL 1% lidocaine with and without epinephrine was utilized for local anesthesia. A small skin incision was made with a scalpel and a 10-gauge needle with an overlying sheath attached to the core biopsy device was introduced into the breast under direct sonographic guidance. The needle was placed within to the mass. A total of 6 core samples were obtained. The specimens were placed in formalin and forwarded to the pathology department. A post biopsy marking clip was placed. Post biopsy mammographic images were obtained. The clip was noted to be in excellent position in the anterior aspect of the 2 adjacent areas of nonmass enhancement seen by MRI  Upon completion of the procedure, compression was applied to the biopsy site until all appreciable bleeding subsided and a sterile dressing was applied. Post biopsy instructions were reviewed with the patient by our clinical breast imaging staff. A written copy of these instructions was also given to the patient. The patient tolerated the procedure well and no immediate complications occurred.   SUMMARY: 10-gauge ultrasound guided and vacuum assisted core biopsy of a 2-3 cm right breast mass located at the 11-11 30 position.  A post biopsy marking clip was placed.  PATHOLOGY: Invasive ductal carcinoma ER negative, IA negative, HER-2/mary equivocal. " "Findings are concordant. The patient will be referred to back to [facility]  This report was finalized on 1/27/2022 11:40 AM by Dr. Svetlana Juárez MD.      US Guided Breast Biopsy With & Without Device initial    Result Date: 1/27/2022  Findings highly suggestive of malignancy at the 11:00 position of the right breast both mammographically and sonographically.   BI-RADS CATEGORY:  5, HIGHLY SUGGESTIVE OF MALIGNANCY   RECOMMENDATION:  Ultrasound-guided biopsy  CAD was utilized.     10G ELEVATION VACUUM-ASSISTED ULTRASOUND GUIDED CORE BIOPSY    History: Findings highly suggestive of malignancy at the right 11-11 30 position  Procedure: Written and verbal consent was obtained for ultrasound guided core biopsy of a 2 to 3 cm centimeter ill-defined spiculated area corresponding to MRI nonmass enhancement at the right 11-11 30 position \" Time out\" was observed to verify the patient's identity and correct location of the breast abnormality. The presence of the lesion was confirmed ultrasound and a lateral approach was chosen. The breast was prepped and draped in the usual sterile fashion and 10 mL 1% lidocaine with and without epinephrine was utilized for local anesthesia. A small skin incision was made with a scalpel and a 10-gauge needle with an overlying sheath attached to the core biopsy device was introduced into the breast under direct sonographic guidance. The needle was placed within to the mass. A total of 6 core samples were obtained. The specimens were placed in formalin and forwarded to the pathology department. A post biopsy marking clip was placed. Post biopsy mammographic images were obtained. The clip was noted to be in excellent position in the anterior aspect of the 2 adjacent areas of nonmass enhancement seen by MRI  Upon completion of the procedure, compression was applied to the biopsy site until all appreciable bleeding subsided and a sterile dressing was applied. Post biopsy instructions were " reviewed with the patient by our clinical breast imaging staff. A written copy of these instructions was also given to the patient. The patient tolerated the procedure well and no immediate complications occurred.   SUMMARY: 10-gauge ultrasound guided and vacuum assisted core biopsy of a 2-3 cm right breast mass located at the 11-11 30 position.  A post biopsy marking clip was placed.  PATHOLOGY: Invasive ductal carcinoma ER negative, TN negative, HER-2/mary equivocal. Findings are concordant. The patient will be referred to back to [facility]  This report was finalized on 1/27/2022 11:40 AM by Dr. Svetlana Juárez MD.      Final Diagnosis   1.  RIGHT BREAST, NEEDLE LOCALIZED LUMPECTOMY:                 Invasive moderately differentiated ductal carcinoma with apocrine morphology ([place] score 7/9)                 Gross tumor size equal 11 mm                 Biopsy site identified                 Focal high-grade ductal carcinoma in situ identified                 Invasive carcinoma present at the deep/posterior surgical margin.  See template #1    2.  LEFT BREAST, NEEDLE LOCALIZED LUMPECTOMY:                 Invasive poorly differentiated ductal carcinoma ([place] score 8/9)                 Gross tumor size 28 mm                Background high-grade ductal carcinoma in situ with involvement of papillomatous lesion                 Invasive ductal carcinoma less than 1 mm from superior margin and medial margin                 Invasive ductal carcinoma 2 mm from inferior margin                 Ductal carcinoma in situ extends to posterior margin                 Biopsy cavity identified.  See template #2    3.  LEFT BREAST, EXTENDED MEDIAL, SUPERIOR, AND DEEP MARGIN:                 Focal residual carcinoma measuring 3 mm near anterior aspect                New anterior margin width is 9 mm    INVASIVE BREAST CANCER STAGING TEMPLATE #1    TYPE OF SPECIMEN/PROCEDURE: Needle localized lumpectomy  SPECIMEN  LATERALITY: Right  TUMOR SITE: 11:00  TUMOR SIZE: 11 mm  TUMOR FOCALITY: Single focus  HISTOLOGIC TYPE OF INVASIVE CARCINOMA: Ductal with apocrine morphology  rdGrdRrdArdDrdErd:rd rd3rd Tubule formation: 3                Mitotic activity: 1                Pleomorphism: 3  OVERALL SCORE: 7/9  DUCTAL CARCINOMA IN SITU EXTENT: Focal  TUMOR EXTENSION: Structures not present                Skin: N/A                Skin satellite nodule: N/A                Nipple: N/a                Skeletal muscle: N/A  SURGICAL MARGINS: Invasive carcinoma present at margin                Invasive carcinoma margins: Tumor present at margin                Distance from closest margin: 0 mm                Specific closest margin: Posterior                   DCIS margins: Uninvolved by DCIS                Distance from closest margin: 6 mm                Specific closest margin: Posterior    LYMPH NODE STATUS: None submitted                Number of lymph nodes with macrometastasis: N/A                Number of lymph nodes with micrometastasis: N/A                Number of lymph nodes with isolated tumor cells: N/A  TOTAL NUMBER OF LYMPH NODES EXAMINED: 0  NUMBER OF SENTINEL NODES EXAMINED: 0  EXTRANODAL EXTENSION OF TUMOR: N/A  SIZE OF LARGEST ENOCH METASTATIC DEPOSIT: N/A  VASCULAR/LYMPHATIC INVASION: N/A  DISTANT SITES INVOLVED: Not applicable  ESTROGEN RECEPTOR STATUS BY IHC METHOD: Negative per previous biopsy  PROGESTERONE RECEPTOR STATUS BY IHC METHOD: Negative per previous biopsy  HER-2/LAURA ONCOPROTEIN STATUS BY IHC METHOD: Equivocal (2+) per previous biopsy  HER-2/LAURA ONCOGENE STATUS BY IN SITU HYBRIDIZATION ANALYSIS: Not amplified  BIOPSY UNDER WHICH BREAST BIOMARKERS PERFORMED: SX   TREATMENT EFFECT (BREAST): No known presurgical therapy  TREATMENT EFFECT (LYMPH NODE): No known presurgical therapy  ADDITIONAL PATHOLOGIC FINDINGS: Fibrocystic change  OTHER STUDIES: Available upon request  AJCC PATHOLOGIC STAGE: (COMPLETED BY  PATHOLOGIST, BASED ONLY ON TISSUE FINDINGS; MORE EXTENSIVE DISEASE MAY NOT BE KNOWN TO THE PATHOLOGIST)  pT = 1c  pN = x  pM = N/A      INVASIVE BREAST CANCER STAGING TEMPLATE #2    TYPE OF SPECIMEN/PROCEDURE: Needle localized lumpectomy  SPECIMEN LATERALITY: Left  TUMOR SITE: 2:00  TUMOR SIZE: 28 mm  TUMOR FOCALITY: Single focus  HISTOLOGIC TYPE OF INVASIVE CARCINOMA: Ductal  thGthRthAthDthEth:th th4th Tubule formation: 3                Mitotic activity: 2                Pleomorphism: 3  OVERALL SCORE: 8/9  DUCTAL CARCINOMA IN SITU EXTENT: 12 mm  TUMOR EXTENSION: Structures not present                Skin: N/A                Skin satellite nodule: N/A                Nipple: N/A                Skeletal muscle: N/A  SURGICAL MARGINS: Final margins uninvolved by in situ or invasive neoplasm                Invasive carcinoma margins: Extended margin uninvolved                Distance from closest margin: Extended margin 9 mm                Specific closest margin: Anterior                   DCIS margins: Uninvolved                Distance from closest margin: 12 mm                Specific closest margin: Posterior    LYMPH NODE STATUS: No lymph nodes submitted                Number of lymph nodes with macrometastasis: N/A                Number of lymph nodes with micrometastasis: N/A                Number of lymph nodes with isolated tumor cells: N/A  TOTAL NUMBER OF LYMPH NODES EXAMINED: 0  NUMBER OF SENTINEL NODES EXAMINED: 0  EXTRANODAL EXTENSION OF TUMOR: N/A  SIZE OF LARGEST ENOCH METASTATIC DEPOSIT: N/A  VASCULAR/LYMPHATIC INVASION: N/A  DISTANT SITES INVOLVED: N/A  ESTROGEN RECEPTOR STATUS BY IHC METHOD: Positive per previous biopsy  PROGESTERONE RECEPTOR STATUS BY IHC METHOD: Negative per previous biopsy  HER-2/LAURA ONCOPROTEIN STATUS BY IHC METHOD: Negative (0+) per previous biopsy   HER-2/LAURA ONCOGENE STATUS BY IN SITU HYBRIDIZATION ANALYSIS: Not performed  BIOPSY UNDER WHICH BREAST BIOMARKERS PERFORMED:  [id number]  TREATMENT EFFECT (BREAST): No known presurgical therapy  TREATMENT EFFECT (LYMPH NODE): No known presurgical therapy  ADDITIONAL PATHOLOGIC FINDINGS: None significant  OTHER STUDIES: Available upon request  AJCC PATHOLOGIC STAGE: (COMPLETED BY PATHOLOGIST, BASED ONLY ON TISSUE FINDINGS; MORE EXTENSIVE DISEASE MAY NOT BE KNOWN TO THE PATHOLOGIST)  pT = 2  pN = x  pM = not applicable            Assessment/Plan    1.  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  She will continue on letrozole adjuvantly.  She is undergoing adjuvant radiotherapy to her lumpectomy sites.  She will complete that after 30 treatments.      2.  Metastatic endometrial cancer.  She had a single focus of disease in her lung.  This was treated with CyberKnife.  I plan to keep her on Femara for now.  She is estrogen receptor positive on the biopsy.  I plan to repeat scans in end of October to see where we stand with her overall disease.    3.  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  This should recover now that I have stopped her chemo.      Ruby Trevino MD  [facility]         Orders Placed This Encounter   Procedures   • CT Abdomen Pelvis With Contrast   • CT Chest With Contrast Diagnostic       8/15/2022

## 2022-08-24 ENCOUNTER — APPOINTMENT (OUTPATIENT)
Dept: CT IMAGING | Facility: HOSPITAL | Age: 77
End: 2022-08-24

## 2022-08-31 ENCOUNTER — APPOINTMENT (OUTPATIENT)
Dept: ONCOLOGY | Facility: HOSPITAL | Age: 77
End: 2022-08-31

## 2022-10-13 ENCOUNTER — TRANSCRIBE ORDERS (OUTPATIENT)
Dept: MAMMOGRAPHY | Facility: HOSPITAL | Age: 77
End: 2022-10-13

## 2022-10-13 DIAGNOSIS — C50.411 MALIGNANT NEOPLASM OF UPPER-OUTER QUADRANT OF RIGHT FEMALE BREAST, UNSPECIFIED ESTROGEN RECEPTOR STATUS: Primary | ICD-10-CM

## 2022-10-13 DIAGNOSIS — C50.412 MALIGNANT NEOPLASM OF UPPER-OUTER QUADRANT OF LEFT FEMALE BREAST, UNSPECIFIED ESTROGEN RECEPTOR STATUS: ICD-10-CM

## 2022-10-26 ENCOUNTER — APPOINTMENT (OUTPATIENT)
Dept: CT IMAGING | Facility: HOSPITAL | Age: 77
End: 2022-10-26

## 2022-10-26 ENCOUNTER — HOSPITAL ENCOUNTER (OUTPATIENT)
Dept: ONCOLOGY | Facility: HOSPITAL | Age: 77
Setting detail: INFUSION SERIES
Discharge: HOME | End: 2022-10-26

## 2022-10-26 ENCOUNTER — OFFICE VISIT (OUTPATIENT)
Dept: ONCOLOGY | Facility: CLINIC | Age: 77
End: 2022-10-26

## 2022-10-26 VITALS
SYSTOLIC BLOOD PRESSURE: 180 MMHG | HEIGHT: 63 IN | RESPIRATION RATE: 16 BRPM | HEART RATE: 52 BPM | WEIGHT: 149 LBS | TEMPERATURE: 97.4 F | OXYGEN SATURATION: 98 % | BODY MASS INDEX: 26.4 KG/M2 | DIASTOLIC BLOOD PRESSURE: 85 MMHG

## 2022-10-26 VITALS
HEIGHT: 63 IN | DIASTOLIC BLOOD PRESSURE: 85 MMHG | SYSTOLIC BLOOD PRESSURE: 180 MMHG | HEART RATE: 52 BPM | RESPIRATION RATE: 16 BRPM | BODY MASS INDEX: 26.4 KG/M2 | WEIGHT: 149 LBS | TEMPERATURE: 97.4 F

## 2022-10-26 DIAGNOSIS — R91.8 MASS OF LEFT LUNG: Primary | ICD-10-CM

## 2022-10-26 DIAGNOSIS — C50.412 MALIGNANT NEOPLASM OF UPPER-OUTER QUADRANT OF BOTH BREASTS IN FEMALE, ESTROGEN RECEPTOR POSITIVE: ICD-10-CM

## 2022-10-26 DIAGNOSIS — Z17.0 MALIGNANT NEOPLASM OF UPPER-OUTER QUADRANT OF BOTH BREASTS IN FEMALE, ESTROGEN RECEPTOR POSITIVE: ICD-10-CM

## 2022-10-26 DIAGNOSIS — C54.1 ENDOMETRIAL CANCER: ICD-10-CM

## 2022-10-26 DIAGNOSIS — C50.411 MALIGNANT NEOPLASM OF UPPER-OUTER QUADRANT OF BOTH BREASTS IN FEMALE, ESTROGEN RECEPTOR POSITIVE: ICD-10-CM

## 2022-10-26 DIAGNOSIS — C54.1 ENDOMETRIAL CANCER: Primary | ICD-10-CM

## 2022-10-26 PROCEDURE — 99214 OFFICE O/P EST MOD 30 MIN: CPT | Performed by: INTERNAL MEDICINE

## 2022-10-26 RX ORDER — SODIUM CHLORIDE 0.9 % (FLUSH) 0.9 %
10 SYRINGE (ML) INJECTION AS NEEDED
OUTPATIENT
Start: 2022-10-26

## 2022-10-26 RX ORDER — HEPARIN SODIUM (PORCINE) LOCK FLUSH IV SOLN 100 UNIT/ML 100 UNIT/ML
500 SOLUTION INTRAVENOUS AS NEEDED
OUTPATIENT
Start: 2022-10-26

## 2022-10-26 RX ORDER — HEPARIN SODIUM (PORCINE) LOCK FLUSH IV SOLN 100 UNIT/ML 100 UNIT/ML
500 SOLUTION INTRAVENOUS AS NEEDED
Status: DISCONTINUED | OUTPATIENT
Start: 2022-10-26 | End: 2022-10-27 | Stop reason: HOSPADM

## 2022-10-26 NOTE — PROGRESS NOTES
PROBLEM LIST:  Oncology/Hematology History   Endometrial cancer (HCC)   9/30/2011 Imaging    TVUS and CT scan for palpable left pelvic mass upon annual exam and new abdominal symptoms.      10/6/2011 Surgery    ADY/BSO with pelvic lymph node dissection. Stage 1A grade 2 endometrial adenocarcinoma by final pathology     1/26/2022 Imaging    IMPRESSION:     Hypermetabolic soft tissue nodule in the left breast compatible with  biopsy-proven invasive ductal carcinoma. There is diffuse low level FDG  uptake in the region of recent right breast biopsy site. A  hypermetabolic mass in the left lower lobe is suspicious for pulmonary  metastasis. No additional sites of metastases are identified. There is  no evidence of discrete/focal hypermetabolic lesion of the sternum to  correspond with the indeterminant sternal lesion described on breast MRI  exam.     2/14/2022 Biopsy    Final Diagnosis   LEFT LUNG, BIOPSY:  Metastatic adenocarcinoma.  See comment.  GJK   Electronically signed by Wolfgang Hair MD on 2/22/2022 at 1154   Comment    Sections show small fragments of tumor that are morphologically distinct from the patient's known breast cancers.  Immunoprofile suggests mullerian origin.  Clinical correlation required.  This case was shown for intradepartmental consultation and the other pathologist concurs with the findings interpretation.  This case was discussed with Dr. Trevino on 02/21/22.  This case was discussed with Dr. Reed on 02/22/22.        5/4/2022 - 7/13/2022 Chemotherapy    OP GYN PACLitaxel / CARBOplatin AUC=2 (Weekly)     5/4/2022 -  Chemotherapy    OP CENTRAL VENOUS ACCESS DEVICE ACCESS, CARE, AND MAINTENANCE (CVAD)     Malignant neoplasm of upper-outer quadrant of both breasts in female, estrogen receptor positive (HCC)   1/21/2022 Initial Diagnosis    Malignant neoplasm of upper-outer quadrant of both breasts in female, estrogen receptor positive (HCC)     1/24/2022 Biopsy    1.  Right breast  cancer-invasive ductal carcinoma grade 2-ER negative DE negative HER-2 negative    2.  Left breast cancer-invasive ductal carcinoma grade 2-ER positive DE negative HER-2 negative     1/26/2022 Imaging    EXAMINATION: NM PET/CT SKULL BASE TO MID THIGH      FINDINGS:      Today's 3-D images demonstrate focal hypermetabolism in the soft tissues  of the left breast as well as focal hypermetabolism within the left  midlung.     Multiplanar images of the head and neck demonstrate symmetric FDG uptake  within the brain. There is no evidence of hypermetabolic neck mass or  lymph node.     In the upper outer quadrant of the left breast there is redemonstration  of a irregular 2.1 cm soft tissue nodule with FDG hypermetabolism of SUV  max 4.2, compatible with biopsy-proven invasive ductal carcinoma. There  is an ill-defined diffuse region of low-level hypermetabolism in the  right breast with adjacent biopsy clip and single locule of soft tissue  air, compatible with recent right breast biopsy. A discrete  hypermetabolic nodule or mass in the right breast is not confidently  identified. There is no hypermetabolic or enlarged axillary lymph nodes.     Within the chest there is redemonstration of a lobulated soft tissue  mass in the superior aspect of the left lower lobe which appears  hypermetabolic with SUV max 6.5. There is no evidence of hypermetabolic  mediastinal or hilar adenopathy.     Below the diaphragm there is expected physiologic uptake within the   and GI tracts. No evidence of abdominal pelvic malignancy or metastasis.  No hypermetabolic abdominopelvic lymph nodes. Photopenic left renal cyst  is noted.     There is no hypermetabolic pathologic marrow process. Specifically,  there is no evidence of hypermetabolic lesion of the sternum to  correspond with the indeterminant sternal lesion detailed on prior  breast MRI exam. FDG uptake at the left antecubital fossa reflects site  of IV administration.      IMPRESSION:     Hypermetabolic soft tissue nodule in the left breast compatible with  biopsy-proven invasive ductal carcinoma. There is diffuse low level FDG  uptake in the region of recent right breast biopsy site. A  hypermetabolic mass in the left lower lobe is suspicious for pulmonary  metastasis. No additional sites of metastases are identified. There is  no evidence of discrete/focal hypermetabolic lesion of the sternum to  correspond with the indeterminant sternal lesion described on breast MRI  exam.           4/20/2022 Cancer Staged    Staging form: Breast, AJCC 8th Edition  - Pathologic: Stage IB (pT1c, pN0, cM0, G3, ER-, ME-, HER2-) - Signed by Ruby Trevino MD on 4/20/2022 5/4/2022 -  Chemotherapy    OP CENTRAL VENOUS ACCESS DEVICE ACCESS, CARE, AND MAINTENANCE (CVAD)     Malignant neoplasm metastatic to left lung (HCC) (Resolved)   3/23/2022 Initial Diagnosis    Malignant neoplasm metastatic to left lung (HCC) (Resolved)     4/26/2022 - 4/26/2022 Radiation    Radiation OncologyTreatment Course:  Kenisha Jama received 2500 cGy in 1 fraction to left lung metastasis via Stereotactic Radiation Therapy - SRT.         REASON FOR VISIT: Management of my cancers     HISTORY OF PRESENT ILLNESS:   77 y.o.  female presents today for follow-up.  She completed carboplatin and Taxol adjuvantly for her bilateral breast cancer.  Her counts did not tolerate her treatment well.  Subsequently I sent her to [place] for radiotherapy and she completed radiotherapy.  She also underwent CyberKnife to the solitary lesion in the lung from endometrial cancer.  She is currently on letrozole alone for now.  Seems to be doing reasonably well.  She is scheduled for CAT scans today but did not know she was supposed to go to the scanner.    Past medical history, social history and family history was reviewed 10/26/22 and unchanged from prior visit.    Review of Systems:    Review of Systems   Constitutional:  Positive for fatigue.   HENT:  Negative.    Eyes: Negative.    Respiratory: Negative.    Cardiovascular: Negative.    Gastrointestinal: Negative.    Endocrine: Negative.    Genitourinary: Negative.     Musculoskeletal: Negative.    Skin: Negative.    Neurological: Negative.    Hematological: Negative.    Psychiatric/Behavioral: Negative.             Medications:        Current Outpatient Medications:   •  acetaminophen (Tylenol 8 Hour) 650 MG 8 hr tablet, Take 1 tablet by mouth Every 8 (Eight) Hours As Needed for Mild Pain ., Disp: 40 tablet, Rfl: 0  •  atorvastatin (LIPITOR) 20 MG tablet, Take 20 mg by mouth Every Evening., Disp: , Rfl:   •  carvedilol (COREG) 12.5 MG tablet, Take 12.5 mg by mouth 2 (Two) Times a Day With Meals., Disp: , Rfl:   •  ezetimibe (ZETIA) 10 MG tablet, Take 10 mg by mouth Every Night., Disp: , Rfl:   •  ferrous sulfate 325 (65 FE) MG tablet, Take 325 mg by mouth Daily With Breakfast., Disp: , Rfl:   •  ibuprofen (ADVIL,MOTRIN) 600 MG tablet, Take 1 tablet by mouth Every 6 (Six) Hours As Needed for Mild Pain ., Disp: 15 tablet, Rfl: 0  •  ketorolac (ACULAR) 0.5 % ophthalmic solution, , Disp: , Rfl:   •  letrozole (FEMARA) 2.5 MG tablet, Take 2.5 mg by mouth Daily., Disp: , Rfl:   •  lidocaine-prilocaine (EMLA) 2.5-2.5 % cream, Apply 1 application topically to the appropriate area as directed As Needed for Injection Site Pain. Apply 45-60 minutes before port access, cover with plastic wrap after application., Disp: 5 g, Rfl: 5  •  lisinopril (PRINIVIL,ZESTRIL) 40 MG tablet, Take 40 mg by mouth Daily., Disp: , Rfl:   •  mupirocin (BACTROBAN) 2 % ointment, APPLY TOPICALLY TO THE AFFECTED AREA(S) THREE TIMES DAILY FOR 7 DAYS, Disp: , Rfl:   •  NIFEdipine CC (ADALAT CC) 30 MG 24 hr tablet, Take 30 mg by mouth Every Morning., Disp: , Rfl:   •  NIFEdipine XL (PROCARDIA XL) 30 MG 24 hr tablet, Take 30 mg by mouth Daily., Disp: , Rfl:   •  pantoprazole (PROTONIX) 40 MG EC tablet, , Disp: , Rfl:   •  " pravastatin (PRAVACHOL) 80 MG tablet, , Disp: , Rfl: 1  •  ursodiol (ACTIGALL) 500 MG tablet, , Disp: , Rfl:     Current Facility-Administered Medications:   •  lidocaine (XYLOCAINE) 1 % injection 5 mL, 5 mL, Infiltration, Once, Svetlana Juárez MD    Facility-Administered Medications Ordered in Other Visits:   •  heparin injection 500 Units, 500 Units, Intravenous, PRN, Ruby Trevino MD    Pain Medications             acetaminophen (Tylenol 8 Hour) 650 MG 8 hr tablet Take 1 tablet by mouth Every 8 (Eight) Hours As Needed for Mild Pain .    ibuprofen (ADVIL,MOTRIN) 600 MG tablet Take 1 tablet by mouth Every 6 (Six) Hours As Needed for Mild Pain .             ALLERGIES:  No Known Allergies      Physical Exam    VITAL SIGNS:  /85   Pulse 52   Temp 97.4 °F (36.3 °C) (Temporal)   Resp 16   Ht 160 cm (63\")   Wt 67.6 kg (149 lb)   SpO2 98% Comment: RA  BMI 26.39 kg/m²     ECOG score: 0           Wt Readings from Last 3 Encounters:   10/26/22 67.6 kg (149 lb)   10/26/22 67.6 kg (149 lb)   08/15/22 67.6 kg (149 lb)       Body mass index is 26.39 kg/m². Body surface area is 1.71 meters squared.    Pain Score    10/26/22 1316   PainSc: 0-No pain           Performance Status: 0    General: well appearing, in no acute distress  HEENT: sclera anicteric, neck is supple  Lymphatics: no cervical, supraclavicular, or axillary adenopathy  Cardiovascular: regular rate and rhythm, no murmurs, rubs or gallops  Lungs: clear to auscultation bilaterally  Abdomen: soft, nontender, nondistended.  No palpable organomegaly  Extremities: no lower extremity edema  Skin: no rashes, lesions, bruising, or petechiae  Msk:  Shows no weakness of the large muscle groups  Psych: Mood is stable        RECENT LABS:    Lab Results   Component Value Date    HGB 8.4 (L) 08/15/2022    HCT 27.0 (L) 08/15/2022    .1 (H) 08/15/2022     08/15/2022    WBC 4.80 08/15/2022    NEUTROABS 3.00 08/15/2022    LYMPHSABS 1.40 08/15/2022 "    MONOSABS 0.40 08/15/2022    EOSABS 0.01 07/06/2022    BASOSABS 0.04 07/06/2022       Lab Results   Component Value Date    GLUCOSE 134 (H) 07/27/2022    BUN 21 07/27/2022    CREATININE 0.77 07/27/2022     (L) 07/27/2022    K 4.4 07/27/2022    CL 98 07/27/2022    CO2 23.0 07/27/2022    CALCIUM 9.2 07/27/2022    PROTEINTOT 6.6 07/27/2022    ALBUMIN 3.80 07/27/2022    BILITOT 0.7 07/27/2022    ALKPHOS 55 07/27/2022    AST 17 07/27/2022    ALT 13 07/27/2022       CT Chest With Contrast Diagnostic    Result Date: 1/10/2022  CT demonstrates no specific osseous correlate to the sternal lesion noted on recent MRI. No corresponding lytic or sclerotic osseous lesion is present. There is no evidence of cortical breakthrough.  An irregular lobulated homogeneous 3.7 x 2.1 cm nodule is noted along the fissure the left lung base. Findings remain suspicious for metastatic involvement, however granulomatous process or possibly pulmonary AVM could have similar appearance. PET/CT would be useful to characterize but this finding and further evaluate for possible CT occult sternal osseous metastasis.  Redemonstration of bilateral soft tissue breast masses concerning for malignancy, better characterized on recent MRI.  This report was finalized on 1/10/2022 3:34 PM by Rodríguez Bacon.      IR Fluoro Guidance Only for Central Line    Result Date: 3/16/2022  10 seconds intraoperative fluoroscopy time during port placement performed by Dr. Reed. Details the procedure can be found in Dr. Reed's note.  This report was finalized on 3/16/2022 2:10 PM by Shemar Ulrich MD.      XR Chest 1 View    Result Date: 3/16/2022  No pneumothorax status post port placement  Atelectasis in the lower left lung  This report was finalized on 3/16/2022 1:22 PM by Pepito Leblanc.      XR Chest 1 View    Result Date: 2/14/2022  No pneumothorax.  This report was finalized on 2/14/2022 2:29 PM by Rodríguez Bacon.      XR Chest 1 View    Result Date:  "2/14/2022  Left lower lobe pulmonary nodule faintly seen. There is no pneumothorax or other evident immediate complication. Unchanged heart and mediastinal contours.  This report was finalized on 2/14/2022 1:06 PM by Rodríguez Bacon.      CT Needle Biopsy Lung    Result Date: 2/16/2022  Impression:                                                              Successful CT guided core biopsy of a mass in the left lobe of the lung as described above.  Thank you for the opportunity to assist in the care of your patient.  This report was finalized on 2/16/2022 8:39 AM by Geovani Wilkins MD.      Mammo Post Clip Placement Right    Result Date: 1/27/2022  Findings highly suggestive of malignancy at the 11:00 position of the right breast both mammographically and sonographically.   BI-RADS CATEGORY:  5, HIGHLY SUGGESTIVE OF MALIGNANCY   RECOMMENDATION:  Ultrasound-guided biopsy  CAD was utilized.     10G ELEVATION VACUUM-ASSISTED ULTRASOUND GUIDED CORE BIOPSY    History: Findings highly suggestive of malignancy at the right 11-11 30 position  Procedure: Written and verbal consent was obtained for ultrasound guided core biopsy of a 2 to 3 cm centimeter ill-defined spiculated area corresponding to MRI nonmass enhancement at the right 11-11 30 position \" Time out\" was observed to verify the patient's identity and correct location of the breast abnormality. The presence of the lesion was confirmed ultrasound and a lateral approach was chosen. The breast was prepped and draped in the usual sterile fashion and 10 mL 1% lidocaine with and without epinephrine was utilized for local anesthesia. A small skin incision was made with a scalpel and a 10-gauge needle with an overlying sheath attached to the core biopsy device was introduced into the breast under direct sonographic guidance. The needle was placed within to the mass. A total of 6 core samples were obtained. The specimens were placed in formalin and forwarded to the " pathology department. A post biopsy marking clip was placed. Post biopsy mammographic images were obtained. The clip was noted to be in excellent position in the anterior aspect of the 2 adjacent areas of nonmass enhancement seen by MRI  Upon completion of the procedure, compression was applied to the biopsy site until all appreciable bleeding subsided and a sterile dressing was applied. Post biopsy instructions were reviewed with the patient by our clinical breast imaging staff. A written copy of these instructions was also given to the patient. The patient tolerated the procedure well and no immediate complications occurred.   SUMMARY: 10-gauge ultrasound guided and vacuum assisted core biopsy of a 2-3 cm right breast mass located at the 11-11 30 position.  A post biopsy marking clip was placed.  PATHOLOGY: Invasive ductal carcinoma ER negative, NC negative, HER-2/mary equivocal. Findings are concordant. The patient will be referred to back to [facility]  This report was finalized on 1/27/2022 11:40 AM by Dr. Svetlana Juárez MD.      MRI Breast Bilateral Diagnostic With & Without Contrast    Result Date: 12/30/2021  1. Known biopsy-proven malignancy on the left 2. Findings highly suggestive of malignancy on the right as described 3. Indeterminate sternal lesion measuring almost 2 cm with questionable expansion/erosion of the anterior sternal cortex. Dedicated bone imaging is recommended.  RECOMMENDATIONS: 1. Diagnostic right mammography and sonography for evaluation for biopsy of the adjacent areas of concern in the right upper outer quadrant. 2. Dedicated bone evaluation of the sternal lesion.  BI-RADS CATEGORY: 5, HIGHLY SUGGESTIVE OF MALIGNANCY  FINAL MRI RESULTS AND RECOMMENDATIONS WILL BE CALLED TO THE PATIENT BY A BREAST CARE NURSE.  This report was finalized on 12/30/2021 11:48 AM by Dr. Svetlana Juárez MD.      NM PET/CT Skull Base to Mid Thigh    Result Date: 1/26/2022   Hypermetabolic soft tissue  nodule in the left breast compatible with biopsy-proven invasive ductal carcinoma. There is diffuse low level FDG uptake in the region of recent right breast biopsy site. A hypermetabolic mass in the left lower lobe is suspicious for pulmonary metastasis. No additional sites of metastases are identified. There is no evidence of discrete/focal hypermetabolic lesion of the sternum to correspond with the indeterminant sternal lesion described on breast MRI exam.   This report was finalized on 1/26/2022 3:33 PM by Anil Saha MD.      Mammo Breast Placement Device Initial Without Biopsy    Result Date: 3/19/2022  Successful needle localization of the known malignancy in the left upper outer quadrant.  Surgical excision will be performed by Dr. Reed.  FINAL PATHOLOGY: Invasive poorly differentiated ductal carcinoma. Gross tumor size 2.8 cm. 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 Positive margins for invasive carcinoma. Findings are concordant. The patient is being followed by [facility].  This report was finalized on 3/19/2022 11:26 AM by Dr. Svetlana Juárez MD.      Mammo Breast Placement Device Initial Without Biopsy    Result Date: 3/19/2022  Successful needle localization of the known right upper outer quadrant malignancy.  Surgical excision will be performed by Dr. Reed.  FINAL PATHOLOGY OF THE RIGHT BREAST: Invasive moderately differentiated ductal carcinoma. Gross tumor size 11 mm. Focal high-grade DCIS. Invasive carcinoma present at the deep/posterior surgical margin. The patient is being cared for by [facility].  This report was finalized on 3/19/2022 11:24 AM by Dr. Svetlana Juárez MD.      Mammo Diagnostic Digital Tomosynthesis Right With CAD    Result Date: 1/27/2022  Findings highly suggestive of malignancy at the 11:00 position of the right breast both mammographically and sonographically.   BI-RADS CATEGORY:  5, HIGHLY SUGGESTIVE OF MALIGNANCY    "RECOMMENDATION:  Ultrasound-guided biopsy  CAD was utilized.     10G ELEVATION VACUUM-ASSISTED ULTRASOUND GUIDED CORE BIOPSY    History: Findings highly suggestive of malignancy at the right 11-11 30 position  Procedure: Written and verbal consent was obtained for ultrasound guided core biopsy of a 2 to 3 cm centimeter ill-defined spiculated area corresponding to MRI nonmass enhancement at the right 11-11 30 position \" Time out\" was observed to verify the patient's identity and correct location of the breast abnormality. The presence of the lesion was confirmed ultrasound and a lateral approach was chosen. The breast was prepped and draped in the usual sterile fashion and 10 mL 1% lidocaine with and without epinephrine was utilized for local anesthesia. A small skin incision was made with a scalpel and a 10-gauge needle with an overlying sheath attached to the core biopsy device was introduced into the breast under direct sonographic guidance. The needle was placed within to the mass. A total of 6 core samples were obtained. The specimens were placed in formalin and forwarded to the pathology department. A post biopsy marking clip was placed. Post biopsy mammographic images were obtained. The clip was noted to be in excellent position in the anterior aspect of the 2 adjacent areas of nonmass enhancement seen by MRI  Upon completion of the procedure, compression was applied to the biopsy site until all appreciable bleeding subsided and a sterile dressing was applied. Post biopsy instructions were reviewed with the patient by our clinical breast imaging staff. A written copy of these instructions was also given to the patient. The patient tolerated the procedure well and no immediate complications occurred.   SUMMARY: 10-gauge ultrasound guided and vacuum assisted core biopsy of a 2-3 cm right breast mass located at the 11-11 30 position.  A post biopsy marking clip was placed.  PATHOLOGY: Invasive ductal carcinoma ER " "negative, MD negative, HER-2/mary equivocal. Findings are concordant. The patient will be referred to back to [facility]  This report was finalized on 1/27/2022 11:40 AM by Dr. Svetlana Juárez MD.      US Guided Breast Biopsy With & Without Device initial    Result Date: 1/27/2022  Findings highly suggestive of malignancy at the 11:00 position of the right breast both mammographically and sonographically.   BI-RADS CATEGORY:  5, HIGHLY SUGGESTIVE OF MALIGNANCY   RECOMMENDATION:  Ultrasound-guided biopsy  CAD was utilized.     10G ELEVATION VACUUM-ASSISTED ULTRASOUND GUIDED CORE BIOPSY    History: Findings highly suggestive of malignancy at the right 11-11 30 position  Procedure: Written and verbal consent was obtained for ultrasound guided core biopsy of a 2 to 3 cm centimeter ill-defined spiculated area corresponding to MRI nonmass enhancement at the right 11-11 30 position \" Time out\" was observed to verify the patient's identity and correct location of the breast abnormality. The presence of the lesion was confirmed ultrasound and a lateral approach was chosen. The breast was prepped and draped in the usual sterile fashion and 10 mL 1% lidocaine with and without epinephrine was utilized for local anesthesia. A small skin incision was made with a scalpel and a 10-gauge needle with an overlying sheath attached to the core biopsy device was introduced into the breast under direct sonographic guidance. The needle was placed within to the mass. A total of 6 core samples were obtained. The specimens were placed in formalin and forwarded to the pathology department. A post biopsy marking clip was placed. Post biopsy mammographic images were obtained. The clip was noted to be in excellent position in the anterior aspect of the 2 adjacent areas of nonmass enhancement seen by MRI  Upon completion of the procedure, compression was applied to the biopsy site until all appreciable bleeding subsided and a sterile " dressing was applied. Post biopsy instructions were reviewed with the patient by our clinical breast imaging staff. A written copy of these instructions was also given to the patient. The patient tolerated the procedure well and no immediate complications occurred.   SUMMARY: 10-gauge ultrasound guided and vacuum assisted core biopsy of a 2-3 cm right breast mass located at the 11-11 30 position.  A post biopsy marking clip was placed.  PATHOLOGY: Invasive ductal carcinoma ER negative, LA negative, HER-2/mary equivocal. Findings are concordant. The patient will be referred to back to [facility]  This report was finalized on 1/27/2022 11:40 AM by Dr. Svetlana Juárez MD.      Final Diagnosis   1.  RIGHT BREAST, NEEDLE LOCALIZED LUMPECTOMY:                 Invasive moderately differentiated ductal carcinoma with apocrine morphology (El[place]ridge score 7/9)                 Gross tumor size equal 11 mm                 Biopsy site identified                 Focal high-grade ductal carcinoma in situ identified                 Invasive carcinoma present at the deep/posterior surgical margin.  See template #1    2.  LEFT BREAST, NEEDLE LOCALIZED LUMPECTOMY:                 Invasive poorly differentiated ductal carcinoma (Elkridge score 8/9)                 Gross tumor size 28 mm                Background high-grade ductal carcinoma in situ with involvement of papillomatous lesion                 Invasive ductal carcinoma less than 1 mm from superior margin and medial margin                 Invasive ductal carcinoma 2 mm from inferior margin                 Ductal carcinoma in situ extends to posterior margin                 Biopsy cavity identified.  See template #2    3.  LEFT BREAST, EXTENDED MEDIAL, SUPERIOR, AND DEEP MARGIN:                 Focal residual carcinoma measuring 3 mm near anterior aspect                New anterior margin width is 9 mm    INVASIVE BREAST CANCER STAGING TEMPLATE #1    TYPE OF  SPECIMEN/PROCEDURE: Needle localized lumpectomy  SPECIMEN LATERALITY: Right  TUMOR SITE: 11:00  TUMOR SIZE: 11 mm  TUMOR FOCALITY: Single focus  HISTOLOGIC TYPE OF INVASIVE CARCINOMA: Ductal with apocrine morphology  rdGrdRrdArdDrdErd:rd rd3rd Tubule formation: 3                Mitotic activity: 1                Pleomorphism: 3  OVERALL SCORE: 7/9  DUCTAL CARCINOMA IN SITU EXTENT: Focal  TUMOR EXTENSION: Structures not present                Skin: N/A                Skin satellite nodule: N/A                Nipple: N/a                Skeletal muscle: N/A  SURGICAL MARGINS: Invasive carcinoma present at margin                Invasive carcinoma margins: Tumor present at margin                Distance from closest margin: 0 mm                Specific closest margin: Posterior                   DCIS margins: Uninvolved by DCIS                Distance from closest margin: 6 mm                Specific closest margin: Posterior    LYMPH NODE STATUS: None submitted                Number of lymph nodes with macrometastasis: N/A                Number of lymph nodes with micrometastasis: N/A                Number of lymph nodes with isolated tumor cells: N/A  TOTAL NUMBER OF LYMPH NODES EXAMINED: 0  NUMBER OF SENTINEL NODES EXAMINED: 0  EXTRANODAL EXTENSION OF TUMOR: N/A  SIZE OF LARGEST ENOCH METASTATIC DEPOSIT: N/A  VASCULAR/LYMPHATIC INVASION: N/A  DISTANT SITES INVOLVED: Not applicable  ESTROGEN RECEPTOR STATUS BY IHC METHOD: Negative per previous biopsy  PROGESTERONE RECEPTOR STATUS BY IHC METHOD: Negative per previous biopsy  HER-2/LAURA ONCOPROTEIN STATUS BY IHC METHOD: Equivocal (2+) per previous biopsy  HER-2/LAURA ONCOGENE STATUS BY IN SITU HYBRIDIZATION ANALYSIS: Not amplified  BIOPSY UNDER WHICH BREAST BIOMARKERS PERFORMED: SX   TREATMENT EFFECT (BREAST): No known presurgical therapy  TREATMENT EFFECT (LYMPH NODE): No known presurgical therapy  ADDITIONAL PATHOLOGIC FINDINGS: Fibrocystic change  OTHER STUDIES:  Available upon request  AJCC PATHOLOGIC STAGE: (COMPLETED BY PATHOLOGIST, BASED ONLY ON TISSUE FINDINGS; MORE EXTENSIVE DISEASE MAY NOT BE KNOWN TO THE PATHOLOGIST)  pT = 1c  pN = x  pM = N/A      INVASIVE BREAST CANCER STAGING TEMPLATE #2    TYPE OF SPECIMEN/PROCEDURE: Needle localized lumpectomy  SPECIMEN LATERALITY: Left  TUMOR SITE: 2:00  TUMOR SIZE: 28 mm  TUMOR FOCALITY: Single focus  HISTOLOGIC TYPE OF INVASIVE CARCINOMA: Ductal  thGthRthAthDthEth:th th4th Tubule formation: 3                Mitotic activity: 2                Pleomorphism: 3  OVERALL SCORE: 8/9  DUCTAL CARCINOMA IN SITU EXTENT: 12 mm  TUMOR EXTENSION: Structures not present                Skin: N/A                Skin satellite nodule: N/A                Nipple: N/A                Skeletal muscle: N/A  SURGICAL MARGINS: Final margins uninvolved by in situ or invasive neoplasm                Invasive carcinoma margins: Extended margin uninvolved                Distance from closest margin: Extended margin 9 mm                Specific closest margin: Anterior                   DCIS margins: Uninvolved                Distance from closest margin: 12 mm                Specific closest margin: Posterior    LYMPH NODE STATUS: No lymph nodes submitted                Number of lymph nodes with macrometastasis: N/A                Number of lymph nodes with micrometastasis: N/A                Number of lymph nodes with isolated tumor cells: N/A  TOTAL NUMBER OF LYMPH NODES EXAMINED: 0  NUMBER OF SENTINEL NODES EXAMINED: 0  EXTRANODAL EXTENSION OF TUMOR: N/A  SIZE OF LARGEST ENOCH METASTATIC DEPOSIT: N/A  VASCULAR/LYMPHATIC INVASION: N/A  DISTANT SITES INVOLVED: N/A  ESTROGEN RECEPTOR STATUS BY IHC METHOD: Positive per previous biopsy  PROGESTERONE RECEPTOR STATUS BY IHC METHOD: Negative per previous biopsy  HER-2/LAURA ONCOPROTEIN STATUS BY IHC METHOD: Negative (0+) per previous biopsy   HER-2/LAURA ONCOGENE STATUS BY IN SITU HYBRIDIZATION ANALYSIS: Not  performed  BIOPSY UNDER WHICH BREAST BIOMARKERS PERFORMED: [id number]  TREATMENT EFFECT (BREAST): No known presurgical therapy  TREATMENT EFFECT (LYMPH NODE): No known presurgical therapy  ADDITIONAL PATHOLOGIC FINDINGS: None significant  OTHER STUDIES: Available upon request  AJCC PATHOLOGIC STAGE: (COMPLETED BY PATHOLOGIST, BASED ONLY ON TISSUE FINDINGS; MORE EXTENSIVE DISEASE MAY NOT BE KNOWN TO THE PATHOLOGIST)  pT = 2  pN = x  pM = not applicable            Assessment/Plan    1.  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  She will continue on letrozole adjuvantly.  Completed adjuvant radiotherapy.  Plan for scans in 4 months.  This is to look for her metastatic endometrial cancer and see where we are with everything.    2.  Metastatic endometrial cancer.  She had a single focus of disease in her lung.  This was treated with CyberKnife.  I plan to keep her on Femara for now.  She is estrogen receptor positive on the biopsy.         Ruby Trevino MD  [facility]    Return on: 03/08/23  Return in (Approximately): 4 months    No orders of the defined types were placed in this encounter.      10/26/2022

## 2023-01-18 RX ORDER — LETROZOLE 2.5 MG/1
TABLET, FILM COATED ORAL
Qty: 90 TABLET | Refills: 3 | Status: SHIPPED | OUTPATIENT
Start: 2023-01-18

## 2023-02-07 ENCOUNTER — TELEPHONE (OUTPATIENT)
Dept: ONCOLOGY | Facility: CLINIC | Age: 78
End: 2023-02-07
Payer: MEDICARE

## 2023-02-07 NOTE — TELEPHONE ENCOUNTER
Caller: Kenisha Jama    Relationship: Self    Best call back number: [phone]    What is the best time to reach you: ANYTIME    Who are you requesting to speak with (clinical staff, provider,  specific staff member): CLINICAL     What was the call regarding: PT NEEDS A COPY OF HER PATHOLOGY REPORT FOR INSURANCE.     CALL TO DISCUSS    Do you require a callback: YES

## 2023-03-08 ENCOUNTER — OFFICE VISIT (OUTPATIENT)
Dept: ONCOLOGY | Facility: CLINIC | Age: 78
End: 2023-03-08
Payer: MEDICARE

## 2023-03-08 VITALS
HEART RATE: 61 BPM | WEIGHT: 156 LBS | BODY MASS INDEX: 27.64 KG/M2 | SYSTOLIC BLOOD PRESSURE: 150 MMHG | HEIGHT: 63 IN | RESPIRATION RATE: 24 BRPM | DIASTOLIC BLOOD PRESSURE: 76 MMHG | OXYGEN SATURATION: 98 % | TEMPERATURE: 96.9 F

## 2023-03-08 DIAGNOSIS — C50.412 MALIGNANT NEOPLASM OF UPPER-OUTER QUADRANT OF BOTH BREASTS IN FEMALE, ESTROGEN RECEPTOR POSITIVE: ICD-10-CM

## 2023-03-08 DIAGNOSIS — C54.1 ENDOMETRIAL CANCER: Primary | ICD-10-CM

## 2023-03-08 DIAGNOSIS — Z17.0 MALIGNANT NEOPLASM OF UPPER-OUTER QUADRANT OF BOTH BREASTS IN FEMALE, ESTROGEN RECEPTOR POSITIVE: ICD-10-CM

## 2023-03-08 DIAGNOSIS — C50.411 MALIGNANT NEOPLASM OF UPPER-OUTER QUADRANT OF BOTH BREASTS IN FEMALE, ESTROGEN RECEPTOR POSITIVE: ICD-10-CM

## 2023-03-08 PROCEDURE — 99214 OFFICE O/P EST MOD 30 MIN: CPT | Performed by: INTERNAL MEDICINE

## 2023-03-08 PROCEDURE — 1126F AMNT PAIN NOTED NONE PRSNT: CPT | Performed by: INTERNAL MEDICINE

## 2023-03-08 NOTE — PROGRESS NOTES
PROBLEM LIST:  Oncology/Hematology History   Endometrial cancer (HCC)   9/30/2011 Imaging    TVUS and CT scan for palpable left pelvic mass upon annual exam and new abdominal symptoms.      10/6/2011 Surgery    ADY/BSO with pelvic lymph node dissection. Stage 1A grade 2 endometrial adenocarcinoma by final pathology     1/26/2022 Imaging    IMPRESSION:     Hypermetabolic soft tissue nodule in the left breast compatible with  biopsy-proven invasive ductal carcinoma. There is diffuse low level FDG  uptake in the region of recent right breast biopsy site. A  hypermetabolic mass in the left lower lobe is suspicious for pulmonary  metastasis. No additional sites of metastases are identified. There is  no evidence of discrete/focal hypermetabolic lesion of the sternum to  correspond with the indeterminant sternal lesion described on breast MRI  exam.     2/14/2022 Biopsy    Final Diagnosis   LEFT LUNG, BIOPSY:  Metastatic adenocarcinoma.  See comment.  GJK   Electronically signed by Wolfgang Hair MD on 2/22/2022 at 1154   Comment    Sections show small fragments of tumor that are morphologically distinct from the patient's known breast cancers.  Immunoprofile suggests mullerian origin.  Clinical correlation required.  This case was shown for intradepartmental consultation and the other pathologist concurs with the findings interpretation.  This case was discussed with Dr. Trevino on 02/21/22.  This case was discussed with Dr. Reed on 02/22/22.        5/4/2022 - 7/13/2022 Chemotherapy    OP GYN PACLitaxel / CARBOplatin AUC=2 (Weekly)     5/4/2022 -  Chemotherapy    OP CENTRAL VENOUS ACCESS DEVICE ACCESS, CARE, AND MAINTENANCE ([facility])     Malignant neoplasm of upper-outer quadrant of both breasts in female, estrogen receptor positive (HCC)   1/21/2022 Initial Diagnosis    Malignant neoplasm of upper-outer quadrant of both breasts in female, estrogen receptor positive (HCC)     1/24/2022 Biopsy    1.  Right breast  cancer-invasive ductal carcinoma grade 2-ER negative IN negative HER-2 negative    2.  Left breast cancer-invasive ductal carcinoma grade 2-ER positive IN negative HER-2 negative     1/26/2022 Imaging    EXAMINATION: NM PET/CT SKULL BASE TO MID THIGH      FINDINGS:      Today's 3-D images demonstrate focal hypermetabolism in the soft tissues  of the left breast as well as focal hypermetabolism within the left  midlung.     Multiplanar images of the head and neck demonstrate symmetric FDG uptake  within the brain. There is no evidence of hypermetabolic neck mass or  lymph node.     In the upper outer quadrant of the left breast there is redemonstration  of a irregular 2.1 cm soft tissue nodule with FDG hypermetabolism of SUV  max 4.2, compatible with biopsy-proven invasive ductal carcinoma. There  is an ill-defined diffuse region of low-level hypermetabolism in the  right breast with adjacent biopsy clip and single locule of soft tissue  air, compatible with recent right breast biopsy. A discrete  hypermetabolic nodule or mass in the right breast is not confidently  identified. There is no hypermetabolic or enlarged axillary lymph nodes.     Within the chest there is redemonstration of a lobulated soft tissue  mass in the superior aspect of the left lower lobe which appears  hypermetabolic with SUV max 6.5. There is no evidence of hypermetabolic  mediastinal or hilar adenopathy.     Below the diaphragm there is expected physiologic uptake within the   and GI tracts. No evidence of abdominal pelvic malignancy or metastasis.  No hypermetabolic abdominopelvic lymph nodes. Photopenic left renal cyst  is noted.     There is no hypermetabolic pathologic marrow process. Specifically,  there is no evidence of hypermetabolic lesion of the sternum to  correspond with the indeterminant sternal lesion detailed on prior  breast MRI exam. FDG uptake at the left antecubital fossa reflects site  of IV administration.      IMPRESSION:     Hypermetabolic soft tissue nodule in the left breast compatible with  biopsy-proven invasive ductal carcinoma. There is diffuse low level FDG  uptake in the region of recent right breast biopsy site. A  hypermetabolic mass in the left lower lobe is suspicious for pulmonary  metastasis. No additional sites of metastases are identified. There is  no evidence of discrete/focal hypermetabolic lesion of the sternum to  correspond with the indeterminant sternal lesion described on breast MRI  exam.           4/20/2022 Cancer Staged    Staging form: Breast, AJCC 8th Edition  - Pathologic: Stage IB (pT1c, pN0, cM0, G3, ER-, IN-, HER2-) - Signed by Ruby Trevino MD on 4/20/2022 5/4/2022 -  Chemotherapy    OP CENTRAL VENOUS ACCESS DEVICE ACCESS, CARE, AND MAINTENANCE ([facility])     Malignant neoplasm metastatic to left lung (HCC) (Resolved)   3/23/2022 Initial Diagnosis    Malignant neoplasm metastatic to left lung (HCC) (Resolved)     4/26/2022 - 4/26/2022 Radiation    Radiation OncologyTreatment Course:  Kenisha Jama received 2500 cGy in 1 fraction to left lung metastasis via Stereotactic Radiation Therapy - SRT.         REASON FOR VISIT: Management of my cancers     HISTORY OF PRESENT ILLNESS:   77 y.o.  female presents today for follow-up.  She completed carboplatin and Taxol adjuvantly for her bilateral breast cancer.  Her counts did not tolerate her treatment well.  Subsequently I sent her to [facility] for radiotherapy and she completed radiotherapy.  She also underwent CyberKnife to the solitary lesion in the lung from endometrial cancer.  She is currently on letrozole alone for now for her endometrial cancer.  Since I last saw her she had her port removed and she is doing well.  Denies any issues with pain.  No weight loss.  No cough.    Past medical history, social history and family history was reviewed 03/08/23 and unchanged from prior visit.    Review of Systems:    Review of  Systems   Constitutional: Positive for fatigue.   HENT:  Negative.    Eyes: Negative.    Respiratory: Negative.    Cardiovascular: Negative.    Gastrointestinal: Negative.    Endocrine: Negative.    Genitourinary: Negative.     Musculoskeletal: Negative.    Skin: Negative.    Neurological: Negative.    Hematological: Negative.    Psychiatric/Behavioral: Negative.             Medications:        Current Outpatient Medications:   •  acetaminophen (Tylenol 8 Hour) 650 MG 8 hr tablet, Take 1 tablet by mouth Every 8 (Eight) Hours As Needed for Mild Pain ., Disp: 40 tablet, Rfl: 0  •  atorvastatin (LIPITOR) 20 MG tablet, Take 1 tablet by mouth Every Evening., Disp: , Rfl:   •  carvedilol (COREG) 12.5 MG tablet, Take 1 tablet by mouth 2 (Two) Times a Day With Meals., Disp: , Rfl:   •  ezetimibe (ZETIA) 10 MG tablet, Take 1 tablet by mouth Every Night., Disp: , Rfl:   •  ferrous sulfate 325 (65 FE) MG tablet, Take 1 tablet by mouth Daily With Breakfast., Disp: , Rfl:   •  ibuprofen (ADVIL,MOTRIN) 600 MG tablet, Take 1 tablet by mouth Every 6 (Six) Hours As Needed for Mild Pain ., Disp: 15 tablet, Rfl: 0  •  ketorolac (ACULAR) 0.5 % ophthalmic solution, , Disp: , Rfl:   •  letrozole (FEMARA) 2.5 MG tablet, TAKE 1 TABLET BY MOUTH DAILY, Disp: 90 tablet, Rfl: 3  •  lidocaine-prilocaine (EMLA) 2.5-2.5 % cream, Apply 1 application topically to the appropriate area as directed As Needed for Injection Site Pain. Apply 45-60 minutes before port access, cover with plastic wrap after application., Disp: 5 g, Rfl: 5  •  lisinopril (PRINIVIL,ZESTRIL) 40 MG tablet, Take 1 tablet by mouth Daily., Disp: , Rfl:   •  mupirocin (BACTROBAN) 2 % ointment, APPLY TOPICALLY TO THE AFFECTED AREA(S) THREE TIMES DAILY FOR 7 DAYS, Disp: , Rfl:   •  NIFEdipine CC (ADALAT CC) 30 MG 24 hr tablet, Take 1 tablet by mouth Every Morning., Disp: , Rfl:   •  NIFEdipine XL (PROCARDIA XL) 30 MG 24 hr tablet, Take 1 tablet by mouth Daily., Disp: , Rfl:   •   "pantoprazole (PROTONIX) 40 MG EC tablet, , Disp: , Rfl:   •  pravastatin (PRAVACHOL) 80 MG tablet, , Disp: , Rfl: 1  •  ursodiol (ACTIGALL) 500 MG tablet, , Disp: , Rfl:     Current Facility-Administered Medications:   •  lidocaine (XYLOCAINE) 1 % injection 5 mL, 5 mL, Infiltration, Once, Svetlana Juárez MD    Pain Medications             acetaminophen (Tylenol 8 Hour) 650 MG 8 hr tablet Take 1 tablet by mouth Every 8 (Eight) Hours As Needed for Mild Pain .    ibuprofen (ADVIL,MOTRIN) 600 MG tablet Take 1 tablet by mouth Every 6 (Six) Hours As Needed for Mild Pain .             ALLERGIES:  No Known Allergies      Physical Exam    VITAL SIGNS:  /76 Comment: LUE  Pulse 61   Temp 96.9 °F (36.1 °C) (Infrared)   Resp 24   Ht 160 cm (63\")   Wt 70.8 kg (156 lb)   SpO2 98% Comment: RA  BMI 27.63 kg/m²     ECOG score: 0           Wt Readings from Last 3 Encounters:   03/08/23 70.8 kg (156 lb)   10/26/22 67.6 kg (149 lb)   10/26/22 67.6 kg (149 lb)       Body mass index is 27.63 kg/m². Body surface area is 1.74 meters squared.    Pain Score    03/08/23 1125   PainSc: 0-No pain           Performance Status: 0    General: well appearing, in no acute distress  HEENT: sclera anicteric, neck is supple  Lymphatics: no cervical, supraclavicular, or axillary adenopathy  Cardiovascular: regular rate and rhythm, no murmurs, rubs or gallops  Lungs: clear to auscultation bilaterally  Abdomen: soft, nontender, nondistended.  No palpable organomegaly  Extremities: no lower extremity edema  Skin: no rashes, lesions, bruising, or petechiae  Msk:  Shows no weakness of the large muscle groups  Psych: Mood is stable        RECENT LABS:    Lab Results   Component Value Date    HGB 8.4 (L) 08/15/2022    HCT 27.0 (L) 08/15/2022    .1 (H) 08/15/2022     08/15/2022    WBC 4.80 08/15/2022    NEUTROABS 3.00 08/15/2022    LYMPHSABS 1.40 08/15/2022    MONOSABS 0.40 08/15/2022    EOSABS 0.01 07/06/2022    BASOSABS 0.04 " 07/06/2022       Lab Results   Component Value Date    GLUCOSE 134 (H) 07/27/2022    BUN 21 07/27/2022    CREATININE 0.77 07/27/2022     (L) 07/27/2022    K 4.4 07/27/2022    CL 98 07/27/2022    CO2 23.0 07/27/2022    CALCIUM 9.2 07/27/2022    PROTEINTOT 6.6 07/27/2022    ALBUMIN 3.80 07/27/2022    BILITOT 0.7 07/27/2022    ALKPHOS 55 07/27/2022    AST 17 07/27/2022    ALT 13 07/27/2022       CT Chest With Contrast Diagnostic    Result Date: 1/10/2022  CT demonstrates no specific osseous correlate to the sternal lesion noted on recent MRI. No corresponding lytic or sclerotic osseous lesion is present. There is no evidence of cortical breakthrough.  An irregular lobulated homogeneous 3.7 x 2.1 cm nodule is noted along the fissure the left lung base. Findings remain suspicious for metastatic involvement, however granulomatous process or possibly pulmonary AVM could have similar appearance. PET/CT would be useful to characterize but this finding and further evaluate for possible CT occult sternal osseous metastasis.  Redemonstration of bilateral soft tissue breast masses concerning for malignancy, better characterized on recent MRI.  This report was finalized on 1/10/2022 3:34 PM by Rodríguez Bacon.      IR Fluoro Guidance Only for Central Line    Result Date: 3/16/2022  10 seconds intraoperative fluoroscopy time during port placement performed by Dr. Reed. Details the procedure can be found in Dr. Reed's note.  This report was finalized on 3/16/2022 2:10 PM by Shemar Ulrich MD.      XR Chest 1 View    Result Date: 3/16/2022  No pneumothorax status post port placement  Atelectasis in the lower left lung  This report was finalized on 3/16/2022 1:22 PM by Pepito Leblanc.      XR Chest 1 View    Result Date: 2/14/2022  No pneumothorax.  This report was finalized on 2/14/2022 2:29 PM by Rodríguez Bacon.      XR Chest 1 View    Result Date: 2/14/2022  Left lower lobe pulmonary nodule faintly seen. There is no  "pneumothorax or other evident immediate complication. Unchanged heart and mediastinal contours.  This report was finalized on 2/14/2022 1:06 PM by Rodríguez Bacon.      CT Needle Biopsy Lung    Result Date: 2/16/2022  Impression:                                                              Successful CT guided core biopsy of a mass in the left lobe of the lung as described above.  Thank you for the opportunity to assist in the care of your patient.  This report was finalized on 2/16/2022 8:39 AM by Geovani Wilkins MD.      Mammo Post Clip Placement Right    Result Date: 1/27/2022  Findings highly suggestive of malignancy at the 11:00 position of the right breast both mammographically and sonographically.   BI-RADS CATEGORY:  5, HIGHLY SUGGESTIVE OF MALIGNANCY   RECOMMENDATION:  Ultrasound-guided biopsy  CAD was utilized.     10G ELEVATION VACUUM-ASSISTED ULTRASOUND GUIDED CORE BIOPSY    History: Findings highly suggestive of malignancy at the right 11-11 30 position  Procedure: Written and verbal consent was obtained for ultrasound guided core biopsy of a 2 to 3 cm centimeter ill-defined spiculated area corresponding to MRI nonmass enhancement at the right 11-11 30 position \" Time out\" was observed to verify the patient's identity and correct location of the breast abnormality. The presence of the lesion was confirmed ultrasound and a lateral approach was chosen. The breast was prepped and draped in the usual sterile fashion and 10 mL 1% lidocaine with and without epinephrine was utilized for local anesthesia. A small skin incision was made with a scalpel and a 10-gauge needle with an overlying sheath attached to the core biopsy device was introduced into the breast under direct sonographic guidance. The needle was placed within to the mass. A total of 6 core samples were obtained. The specimens were placed in formalin and forwarded to the pathology department. A post biopsy marking clip was placed. Post biopsy " mammographic images were obtained. The clip was noted to be in excellent position in the anterior aspect of the 2 adjacent areas of nonmass enhancement seen by MRI  Upon completion of the procedure, compression was applied to the biopsy site until all appreciable bleeding subsided and a sterile dressing was applied. Post biopsy instructions were reviewed with the patient by our clinical breast imaging staff. A written copy of these instructions was also given to the patient. The patient tolerated the procedure well and no immediate complications occurred.   SUMMARY: 10-gauge ultrasound guided and vacuum assisted core biopsy of a 2-3 cm right breast mass located at the 11-11 30 position.  A post biopsy marking clip was placed.  PATHOLOGY: Invasive ductal carcinoma ER negative, ND negative, HER-2/mary equivocal. Findings are concordant. The patient will be referred to back to [facility]  This report was finalized on 1/27/2022 11:40 AM by Dr. Svetlana Juárez MD.      MRI Breast Bilateral Diagnostic With & Without Contrast    Result Date: 12/30/2021  1. Known biopsy-proven malignancy on the left 2. Findings highly suggestive of malignancy on the right as described 3. Indeterminate sternal lesion measuring almost 2 cm with questionable expansion/erosion of the anterior sternal cortex. Dedicated bone imaging is recommended.  RECOMMENDATIONS: 1. Diagnostic right mammography and sonography for evaluation for biopsy of the adjacent areas of concern in the right upper outer quadrant. 2. Dedicated bone evaluation of the sternal lesion.  BI-RADS CATEGORY: 5, HIGHLY SUGGESTIVE OF MALIGNANCY  FINAL MRI RESULTS AND RECOMMENDATIONS WILL BE CALLED TO THE PATIENT BY A BREAST CARE NURSE.  This report was finalized on 12/30/2021 11:48 AM by Dr. Svetlana Juárez MD.      NM PET/CT Skull Base to Mid Thigh    Result Date: 1/26/2022   Hypermetabolic soft tissue nodule in the left breast compatible with biopsy-proven invasive ductal  carcinoma. There is diffuse low level FDG uptake in the region of recent right breast biopsy site. A hypermetabolic mass in the left lower lobe is suspicious for pulmonary metastasis. No additional sites of metastases are identified. There is no evidence of discrete/focal hypermetabolic lesion of the sternum to correspond with the indeterminant sternal lesion described on breast MRI exam.   This report was finalized on 1/26/2022 3:33 PM by Anil Saha MD.      Mammo Breast Placement Device Initial Without Biopsy    Result Date: 3/19/2022  Successful needle localization of the known malignancy in the left upper outer quadrant.  Surgical excision will be performed by Dr. Reed.  FINAL PATHOLOGY: Invasive poorly differentiated ductal carcinoma. Gross tumor size 2.8 cm. 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 Positive margins for invasive carcinoma. Findings are concordant. The patient is being followed by [facility].  This report was finalized on 3/19/2022 11:26 AM by Dr. Svetlana Juárez MD.      Mammo Breast Placement Device Initial Without Biopsy    Result Date: 3/19/2022  Successful needle localization of the known right upper outer quadrant malignancy.  Surgical excision will be performed by Dr. Reed.  FINAL PATHOLOGY OF THE RIGHT BREAST: Invasive moderately differentiated ductal carcinoma. Gross tumor size 11 mm. Focal high-grade DCIS. Invasive carcinoma present at the deep/posterior surgical margin. The patient is being cared for by [facility].  This report was finalized on 3/19/2022 11:24 AM by Dr. Svetlana Juárez MD.      Mammo Diagnostic Digital Tomosynthesis Right With CAD    Result Date: 1/27/2022  Findings highly suggestive of malignancy at the 11:00 position of the right breast both mammographically and sonographically.   BI-RADS CATEGORY:  5, HIGHLY SUGGESTIVE OF MALIGNANCY   RECOMMENDATION:  Ultrasound-guided biopsy  CAD was utilized.     10G ELEVATION  "VACUUM-ASSISTED ULTRASOUND GUIDED CORE BIOPSY    History: Findings highly suggestive of malignancy at the right 11-11 30 position  Procedure: Written and verbal consent was obtained for ultrasound guided core biopsy of a 2 to 3 cm centimeter ill-defined spiculated area corresponding to MRI nonmass enhancement at the right 11-11 30 position \" Time out\" was observed to verify the patient's identity and correct location of the breast abnormality. The presence of the lesion was confirmed ultrasound and a lateral approach was chosen. The breast was prepped and draped in the usual sterile fashion and 10 mL 1% lidocaine with and without epinephrine was utilized for local anesthesia. A small skin incision was made with a scalpel and a 10-gauge needle with an overlying sheath attached to the core biopsy device was introduced into the breast under direct sonographic guidance. The needle was placed within to the mass. A total of 6 core samples were obtained. The specimens were placed in formalin and forwarded to the pathology department. A post biopsy marking clip was placed. Post biopsy mammographic images were obtained. The clip was noted to be in excellent position in the anterior aspect of the 2 adjacent areas of nonmass enhancement seen by MRI  Upon completion of the procedure, compression was applied to the biopsy site until all appreciable bleeding subsided and a sterile dressing was applied. Post biopsy instructions were reviewed with the patient by our clinical breast imaging staff. A written copy of these instructions was also given to the patient. The patient tolerated the procedure well and no immediate complications occurred.   SUMMARY: 10-gauge ultrasound guided and vacuum assisted core biopsy of a 2-3 cm right breast mass located at the 11-11 30 position.  A post biopsy marking clip was placed.  PATHOLOGY: Invasive ductal carcinoma ER negative, NH negative, HER-2/mary equivocal. Findings are concordant. The " "patient will be referred to back to [facility]  This report was finalized on 1/27/2022 11:40 AM by Dr. Svetlana Juárez MD.      US Guided Breast Biopsy With & Without Device initial    Result Date: 1/27/2022  Findings highly suggestive of malignancy at the 11:00 position of the right breast both mammographically and sonographically.   BI-RADS CATEGORY:  5, HIGHLY SUGGESTIVE OF MALIGNANCY   RECOMMENDATION:  Ultrasound-guided biopsy  CAD was utilized.     10G ELEVATION VACUUM-ASSISTED ULTRASOUND GUIDED CORE BIOPSY    History: Findings highly suggestive of malignancy at the right 11-11 30 position  Procedure: Written and verbal consent was obtained for ultrasound guided core biopsy of a 2 to 3 cm centimeter ill-defined spiculated area corresponding to MRI nonmass enhancement at the right 11-11 30 position \" Time out\" was observed to verify the patient's identity and correct location of the breast abnormality. The presence of the lesion was confirmed ultrasound and a lateral approach was chosen. The breast was prepped and draped in the usual sterile fashion and 10 mL 1% lidocaine with and without epinephrine was utilized for local anesthesia. A small skin incision was made with a scalpel and a 10-gauge needle with an overlying sheath attached to the core biopsy device was introduced into the breast under direct sonographic guidance. The needle was placed within to the mass. A total of 6 core samples were obtained. The specimens were placed in formalin and forwarded to the pathology department. A post biopsy marking clip was placed. Post biopsy mammographic images were obtained. The clip was noted to be in excellent position in the anterior aspect of the 2 adjacent areas of nonmass enhancement seen by MRI  Upon completion of the procedure, compression was applied to the biopsy site until all appreciable bleeding subsided and a sterile dressing was applied. Post biopsy instructions were reviewed with the patient by " our clinical breast imaging staff. A written copy of these instructions was also given to the patient. The patient tolerated the procedure well and no immediate complications occurred.   SUMMARY: 10-gauge ultrasound guided and vacuum assisted core biopsy of a 2-3 cm right breast mass located at the 11-11 30 position.  A post biopsy marking clip was placed.  PATHOLOGY: Invasive ductal carcinoma ER negative, LA negative, HER-2/mary equivocal. Findings are concordant. The patient will be referred to back to [facility]  This report was finalized on 1/27/2022 11:40 AM by Dr. Svetlana Juárez MD.      Final Diagnosis   1.  RIGHT BREAST, NEEDLE LOCALIZED LUMPECTOMY:                 Invasive moderately differentiated ductal carcinoma with apocrine morphology ([place] score 7/9)                 Gross tumor size equal 11 mm                 Biopsy site identified                 Focal high-grade ductal carcinoma in situ identified                 Invasive carcinoma present at the deep/posterior surgical margin.  See template #1    2.  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  See template #2    3.  LEFT BREAST, EXTENDED MEDIAL, SUPERIOR, AND DEEP MARGIN:                 Focal residual carcinoma measuring 3 mm near anterior aspect                New anterior margin width is 9 mm    INVASIVE BREAST CANCER STAGING TEMPLATE #1    TYPE OF SPECIMEN/PROCEDURE: Needle localized lumpectomy  SPECIMEN LATERALITY: Right  TUMOR SITE:  11:00  TUMOR SIZE: 11 mm  TUMOR FOCALITY: Single focus  HISTOLOGIC TYPE OF INVASIVE CARCINOMA: Ductal with apocrine morphology  rdGrdRrdArdDrdErd:rd rd3rd Tubule formation: 3                Mitotic activity: 1                Pleomorphism: 3  OVERALL SCORE: 7/9  DUCTAL CARCINOMA IN SITU EXTENT: Focal  TUMOR EXTENSION: Structures not present                Skin: N/A                Skin satellite nodule: N/A                Nipple: N/a                Skeletal muscle: N/A  SURGICAL MARGINS: Invasive carcinoma present at margin                Invasive carcinoma margins: Tumor present at margin                Distance from closest margin: 0 mm                Specific closest margin: Posterior                   DCIS margins: Uninvolved by DCIS                Distance from closest margin: 6 mm                Specific closest margin: Posterior    LYMPH NODE STATUS: None submitted                Number of lymph nodes with macrometastasis: N/A                Number of lymph nodes with micrometastasis: N/A                Number of lymph nodes with isolated tumor cells: N/A  TOTAL NUMBER OF LYMPH NODES EXAMINED: 0  NUMBER OF SENTINEL NODES EXAMINED: 0  EXTRANODAL EXTENSION OF TUMOR: N/A  SIZE OF LARGEST ENOCH METASTATIC DEPOSIT: N/A  VASCULAR/LYMPHATIC INVASION: N/A  DISTANT SITES INVOLVED: Not applicable  ESTROGEN RECEPTOR STATUS BY IHC METHOD: Negative per previous biopsy  PROGESTERONE RECEPTOR STATUS BY IHC METHOD: Negative per previous biopsy  HER-2/LAURA ONCOPROTEIN STATUS BY IHC METHOD: Equivocal (2+) per previous biopsy  HER-2/LAURA ONCOGENE STATUS BY IN SITU HYBRIDIZATION ANALYSIS: Not amplified  BIOPSY UNDER WHICH BREAST BIOMARKERS PERFORMED: SX   TREATMENT EFFECT (BREAST): No known presurgical therapy  TREATMENT EFFECT (LYMPH NODE): No known presurgical therapy  ADDITIONAL PATHOLOGIC FINDINGS: Fibrocystic change  OTHER STUDIES: Available upon request  AJCC PATHOLOGIC STAGE: (COMPLETED BY PATHOLOGIST, BASED ONLY ON TISSUE  FINDINGS; MORE EXTENSIVE DISEASE MAY NOT BE KNOWN TO THE PATHOLOGIST)  pT = 1c  pN = x  pM = N/A      INVASIVE BREAST CANCER STAGING TEMPLATE #2    TYPE OF SPECIMEN/PROCEDURE: Needle localized lumpectomy  SPECIMEN LATERALITY: Left  TUMOR SITE: 2:00  TUMOR SIZE: 28 mm  TUMOR FOCALITY: Single focus  HISTOLOGIC TYPE OF INVASIVE CARCINOMA: Ductal  thGthRthAthDthEth:th th4th Tubule formation: 3                Mitotic activity: 2                Pleomorphism: 3  OVERALL SCORE: 8/9  DUCTAL CARCINOMA IN SITU EXTENT: 12 mm  TUMOR EXTENSION: Structures not present                Skin: N/A                Skin satellite nodule: N/A                Nipple: N/A                Skeletal muscle: N/A  SURGICAL MARGINS: Final margins uninvolved by in situ or invasive neoplasm                Invasive carcinoma margins: Extended margin uninvolved                Distance from closest margin: Extended margin 9 mm                Specific closest margin: Anterior                   DCIS margins: Uninvolved                Distance from closest margin: 12 mm                Specific closest margin: Posterior    LYMPH NODE STATUS: No lymph nodes submitted                Number of lymph nodes with macrometastasis: N/A                Number of lymph nodes with micrometastasis: N/A                Number of lymph nodes with isolated tumor cells: N/A  TOTAL NUMBER OF LYMPH NODES EXAMINED: 0  NUMBER OF SENTINEL NODES EXAMINED: 0  EXTRANODAL EXTENSION OF TUMOR: N/A  SIZE OF LARGEST ENOCH METASTATIC DEPOSIT: N/A  VASCULAR/LYMPHATIC INVASION: N/A  DISTANT SITES INVOLVED: N/A  ESTROGEN RECEPTOR STATUS BY IHC METHOD: Positive per previous biopsy  PROGESTERONE RECEPTOR STATUS BY IHC METHOD: Negative per previous biopsy  HER-2/LAURA ONCOPROTEIN STATUS BY IHC METHOD: Negative (0+) per previous biopsy   HER-2/LAURA ONCOGENE STATUS BY IN SITU HYBRIDIZATION ANALYSIS: Not performed  BIOPSY UNDER WHICH BREAST BIOMARKERS PERFORMED: [id number]  TREATMENT EFFECT (BREAST): No  known presurgical therapy  TREATMENT EFFECT (LYMPH NODE): No known presurgical therapy  ADDITIONAL PATHOLOGIC FINDINGS: None significant  OTHER STUDIES: Available upon request  AJCC PATHOLOGIC STAGE: (COMPLETED BY PATHOLOGIST, BASED ONLY ON TISSUE FINDINGS; MORE EXTENSIVE DISEASE MAY NOT BE KNOWN TO THE PATHOLOGIST)  pT = 2  pN = x  pM = not applicable            Assessment/Plan    1.  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   completed adjuvant chemotherapy and has now completed  adjuvant radiotherapy.      2.  Metastatic endometrial cancer.  She had a single focus of disease in her lung.  This was treated with CyberKnife.  I plan to keep her on Femara for now.  She is estrogen receptor positive on the biopsy.  I was planning to scan her today.  However she missed her appointment.  Plan to reschedule her scans for 3 months.  Thankfully no clinical sign of recurrence.        Ruby Trevino MD  [facility]ematology [facility] On[facility]         No orders of the defined types were placed in this encounter.      3/8/2023

## 2023-04-21 ENCOUNTER — TRANSCRIBE ORDERS (OUTPATIENT)
Dept: ADMINISTRATIVE | Facility: HOSPITAL | Age: 78
End: 2023-04-21
Payer: MEDICARE

## 2023-04-21 ENCOUNTER — HOSPITAL ENCOUNTER (OUTPATIENT)
Dept: MAMMOGRAPHY | Facility: HOSPITAL | Age: 78
Discharge: HOME | End: 2023-04-21
Payer: MEDICARE

## 2023-04-21 DIAGNOSIS — C50.411 MALIGNANT NEOPLASM OF UPPER-OUTER QUADRANT OF RIGHT FEMALE BREAST, UNSPECIFIED ESTROGEN RECEPTOR STATUS: ICD-10-CM

## 2023-04-21 DIAGNOSIS — Z09 FOLLOW-UP EXAM: Primary | ICD-10-CM

## 2023-04-21 DIAGNOSIS — C50.412 MALIGNANT NEOPLASM OF UPPER-OUTER QUADRANT OF LEFT FEMALE BREAST, UNSPECIFIED ESTROGEN RECEPTOR STATUS: ICD-10-CM

## 2023-04-21 PROCEDURE — 77066 DX MAMMO INCL CAD BI: CPT

## 2023-04-21 PROCEDURE — G0279 TOMOSYNTHESIS, MAMMO: HCPCS

## 2023-06-07 ENCOUNTER — OFFICE VISIT (OUTPATIENT)
Dept: ONCOLOGY | Facility: CLINIC | Age: 78
End: 2023-06-07
Payer: MEDICARE

## 2023-06-07 ENCOUNTER — HOSPITAL ENCOUNTER (OUTPATIENT)
Dept: CT IMAGING | Facility: HOSPITAL | Age: 78
Discharge: HOME | End: 2023-06-07
Admitting: INTERNAL MEDICINE
Payer: MEDICARE

## 2023-06-07 VITALS
HEART RATE: 60 BPM | HEIGHT: 63 IN | BODY MASS INDEX: 27.46 KG/M2 | TEMPERATURE: 96.8 F | RESPIRATION RATE: 20 BRPM | WEIGHT: 155 LBS | DIASTOLIC BLOOD PRESSURE: 76 MMHG | SYSTOLIC BLOOD PRESSURE: 156 MMHG | OXYGEN SATURATION: 98 %

## 2023-06-07 DIAGNOSIS — C50.412 MALIGNANT NEOPLASM OF UPPER-OUTER QUADRANT OF BOTH BREASTS IN FEMALE, ESTROGEN RECEPTOR POSITIVE: Primary | ICD-10-CM

## 2023-06-07 DIAGNOSIS — Z17.0 MALIGNANT NEOPLASM OF UPPER-OUTER QUADRANT OF BOTH BREASTS IN FEMALE, ESTROGEN RECEPTOR POSITIVE: Primary | ICD-10-CM

## 2023-06-07 DIAGNOSIS — C54.1 ENDOMETRIAL CANCER: ICD-10-CM

## 2023-06-07 DIAGNOSIS — R91.8 MASS OF LEFT LUNG: ICD-10-CM

## 2023-06-07 DIAGNOSIS — C50.411 MALIGNANT NEOPLASM OF UPPER-OUTER QUADRANT OF BOTH BREASTS IN FEMALE, ESTROGEN RECEPTOR POSITIVE: Primary | ICD-10-CM

## 2023-06-07 LAB — CREAT BLDA-MCNC: 0.9 MG/DL (ref 0.6–1.3)

## 2023-06-07 PROCEDURE — 1126F AMNT PAIN NOTED NONE PRSNT: CPT | Performed by: INTERNAL MEDICINE

## 2023-06-07 PROCEDURE — 25510000001 IOPAMIDOL 61 % SOLUTION: Performed by: INTERNAL MEDICINE

## 2023-06-07 PROCEDURE — 74177 CT ABD & PELVIS W/CONTRAST: CPT

## 2023-06-07 PROCEDURE — 99214 OFFICE O/P EST MOD 30 MIN: CPT | Performed by: INTERNAL MEDICINE

## 2023-06-07 PROCEDURE — 82565 ASSAY OF CREATININE: CPT

## 2023-06-07 PROCEDURE — 71260 CT THORAX DX C+: CPT

## 2023-06-07 RX ADMIN — IOPAMIDOL 90 ML: 612 INJECTION, SOLUTION INTRAVENOUS at 10:35

## 2023-06-07 NOTE — PROGRESS NOTES
PROBLEM LIST:  Oncology/Hematology History   Endometrial cancer   9/30/2011 Imaging    TVUS and CT scan for palpable left pelvic mass upon annual exam and new abdominal symptoms.      10/6/2011 Surgery    ADY/BSO with pelvic lymph node dissection. Stage 1A grade 2 endometrial adenocarcinoma by final pathology     1/26/2022 Imaging    IMPRESSION:     Hypermetabolic soft tissue nodule in the left breast compatible with  biopsy-proven invasive ductal carcinoma. There is diffuse low level FDG  uptake in the region of recent right breast biopsy site. A  hypermetabolic mass in the left lower lobe is suspicious for pulmonary  metastasis. No additional sites of metastases are identified. There is  no evidence of discrete/focal hypermetabolic lesion of the sternum to  correspond with the indeterminant sternal lesion described on breast MRI  exam.     2/14/2022 Biopsy    Final Diagnosis   LEFT LUNG, BIOPSY:  Metastatic adenocarcinoma.  See comment.  GJK   Electronically signed by Wolfgang Hair MD on 2/22/2022 at 1154   Comment    Sections show small fragments of tumor that are morphologically distinct from the patient's known breast cancers.  Immunoprofile suggests mullerian origin.  Clinical correlation required.  This case was shown for intradepartmental consultation and the other pathologist concurs with the findings interpretation.  This case was discussed with Dr. Trevino on 02/21/22.  This case was discussed with Dr. Reed on 02/22/22.        5/4/2022 - 7/13/2022 Chemotherapy    OP GYN PACLitaxel / CARBOplatin AUC=2 (Weekly)       5/4/2022 -  Chemotherapy    OP CENTRAL VENOUS ACCESS DEVICE ACCESS, CARE, AND MAINTENANCE ([facility])       Malignant neoplasm of upper-outer quadrant of both breasts in female, estrogen receptor positive   1/21/2022 Initial Diagnosis    Malignant neoplasm of upper-outer quadrant of both breasts in female, estrogen receptor positive (HCC)     1/24/2022 Biopsy    1.  Right breast  cancer-invasive ductal carcinoma grade 2-ER negative LA negative HER-2 negative    2.  Left breast cancer-invasive ductal carcinoma grade 2-ER positive LA negative HER-2 negative     1/26/2022 Imaging    EXAMINATION: NM PET/CT SKULL BASE TO MID THIGH      FINDINGS:      Today's 3-D images demonstrate focal hypermetabolism in the soft tissues  of the left breast as well as focal hypermetabolism within the left  midlung.     Multiplanar images of the head and neck demonstrate symmetric FDG uptake  within the brain. There is no evidence of hypermetabolic neck mass or  lymph node.     In the upper outer quadrant of the left breast there is redemonstration  of a irregular 2.1 cm soft tissue nodule with FDG hypermetabolism of SUV  max 4.2, compatible with biopsy-proven invasive ductal carcinoma. There  is an ill-defined diffuse region of low-level hypermetabolism in the  right breast with adjacent biopsy clip and single locule of soft tissue  air, compatible with recent right breast biopsy. A discrete  hypermetabolic nodule or mass in the right breast is not confidently  identified. There is no hypermetabolic or enlarged axillary lymph nodes.     Within the chest there is redemonstration of a lobulated soft tissue  mass in the superior aspect of the left lower lobe which appears  hypermetabolic with SUV max 6.5. There is no evidence of hypermetabolic  mediastinal or hilar adenopathy.     Below the diaphragm there is expected physiologic uptake within the   and GI tracts. No evidence of abdominal pelvic malignancy or metastasis.  No hypermetabolic abdominopelvic lymph nodes. Photopenic left renal cyst  is noted.     There is no hypermetabolic pathologic marrow process. Specifically,  there is no evidence of hypermetabolic lesion of the sternum to  correspond with the indeterminant sternal lesion detailed on prior  breast MRI exam. FDG uptake at the left antecubital fossa reflects site  of IV administration.      IMPRESSION:     Hypermetabolic soft tissue nodule in the left breast compatible with  biopsy-proven invasive ductal carcinoma. There is diffuse low level FDG  uptake in the region of recent right breast biopsy site. A  hypermetabolic mass in the left lower lobe is suspicious for pulmonary  metastasis. No additional sites of metastases are identified. There is  no evidence of discrete/focal hypermetabolic lesion of the sternum to  correspond with the indeterminant sternal lesion described on breast MRI  exam.           4/20/2022 Cancer Staged    Staging form: Breast, AJCC 8th Edition  - Pathologic: Stage IB (pT1c, pN0, cM0, G3, ER-, MN-, HER2-) - Signed by Ruby Trevino MD on 4/20/2022 5/4/2022 -  Chemotherapy    OP CENTRAL VENOUS ACCESS DEVICE ACCESS, CARE, AND MAINTENANCE ([facility])       Malignant neoplasm metastatic to left lung (Resolved)   3/23/2022 Initial Diagnosis    Malignant neoplasm metastatic to left lung (HCC) (Resolved)     4/26/2022 - 4/26/2022 Radiation    Radiation OncologyTreatment Course:  Kenisha Jama received 2500 cGy in 1 fraction to left lung metastasis via Stereotactic Radiation Therapy - SRT.         REASON FOR VISIT: Management of my cancers     HISTORY OF PRESENT ILLNESS:   77 y.o.  female presents today for follow-up.  She completed carboplatin and Taxol adjuvantly for her bilateral breast cancer.  Her counts did not tolerate her treatment well.  Subsequently I sent her to [place] for radiotherapy and she completed radiotherapy.  She also underwent CyberKnife to the solitary lesion in the lung from endometrial cancer.  She is currently on letrozole alone for now for her endometrial cancer.  She presents after having scans done.  Since I last saw her she reports her dog pulled her and she had a fall causing likely rib fractures on the right side.    Past medical history, social history and family history was reviewed 06/07/23 and unchanged from prior visit.    Review of  Systems:    Review of Systems   Constitutional:  Positive for fatigue.   HENT:  Negative.     Eyes: Negative.    Respiratory: Negative.     Cardiovascular: Negative.    Gastrointestinal: Negative.    Endocrine: Negative.    Genitourinary: Negative.     Musculoskeletal: Negative.    Skin: Negative.    Neurological: Negative.    Hematological: Negative.    Psychiatric/Behavioral: Negative.            Medications:        Current Outpatient Medications:     acetaminophen (Tylenol 8 Hour) 650 MG 8 hr tablet, Take 1 tablet by mouth Every 8 (Eight) Hours As Needed for Mild Pain ., Disp: 40 tablet, Rfl: 0    atorvastatin (LIPITOR) 20 MG tablet, Take 1 tablet by mouth Every Evening., Disp: , Rfl:     carvedilol (COREG) 12.5 MG tablet, Take 1 tablet by mouth 2 (Two) Times a Day With Meals., Disp: , Rfl:     ezetimibe (ZETIA) 10 MG tablet, Take 1 tablet by mouth Every Night., Disp: , Rfl:     ferrous sulfate 325 (65 FE) MG tablet, Take 1 tablet by mouth Daily With Breakfast., Disp: , Rfl:     ibuprofen (ADVIL,MOTRIN) 600 MG tablet, Take 1 tablet by mouth Every 6 (Six) Hours As Needed for Mild Pain ., Disp: 15 tablet, Rfl: 0    ketorolac (ACULAR) 0.5 % ophthalmic solution, , Disp: , Rfl:     letrozole (FEMARA) 2.5 MG tablet, TAKE 1 TABLET BY MOUTH DAILY, Disp: 90 tablet, Rfl: 3    lidocaine-prilocaine (EMLA) 2.5-2.5 % cream, Apply 1 application topically to the appropriate area as directed As Needed for Injection Site Pain. Apply 45-60 minutes before port access, cover with plastic wrap after application., Disp: 5 g, Rfl: 5    lisinopril (PRINIVIL,ZESTRIL) 40 MG tablet, Take 1 tablet by mouth Daily., Disp: , Rfl:     mupirocin (BACTROBAN) 2 % ointment, APPLY TOPICALLY TO THE AFFECTED AREA(S) THREE TIMES DAILY FOR 7 DAYS, Disp: , Rfl:     NIFEdipine CC (ADALAT CC) 30 MG 24 hr tablet, Take 1 tablet by mouth Every Morning., Disp: , Rfl:     NIFEdipine XL (PROCARDIA XL) 30 MG 24 hr tablet, Take 1 tablet by mouth Daily., Disp: ,  "Rfl:     pantoprazole (PROTONIX) 40 MG EC tablet, , Disp: , Rfl:     pravastatin (PRAVACHOL) 80 MG tablet, , Disp: , Rfl: 1    ursodiol (ACTIGALL) 500 MG tablet, , Disp: , Rfl:     Current Facility-Administered Medications:     lidocaine (XYLOCAINE) 1 % injection 5 mL, 5 mL, Infiltration, Once, Svetlana Juárez MD    Pain Medications               acetaminophen (Tylenol 8 Hour) 650 MG 8 hr tablet Take 1 tablet by mouth Every 8 (Eight) Hours As Needed for Mild Pain .    ibuprofen (ADVIL,MOTRIN) 600 MG tablet Take 1 tablet by mouth Every 6 (Six) Hours As Needed for Mild Pain .               ALLERGIES:  No Known Allergies      Physical Exam    VITAL SIGNS:  /76 Comment: LUE  Pulse 60   Temp 96.8 °F (36 °C) (Infrared)   Resp 20   Ht 160 cm (63\")   Wt 70.3 kg (155 lb)   SpO2 98% Comment: RA  BMI 27.46 kg/m²     ECOG score: 0           Wt Readings from Last 3 Encounters:   06/07/23 70.3 kg (155 lb)   03/08/23 70.8 kg (156 lb)   10/26/22 67.6 kg (149 lb)       Body mass index is 27.46 kg/m². Body surface area is 1.74 meters squared.    Pain Score    06/07/23 1053   PainSc: 0-No pain           Performance Status: 0    General: well appearing, in no acute distress  HEENT: sclera anicteric, neck is supple  Lymphatics: no cervical, supraclavicular, or axillary adenopathy  Cardiovascular: regular rate and rhythm, no murmurs, rubs or gallops  Lungs: clear to auscultation bilaterally  Abdomen: soft, nontender, nondistended.  No palpable organomegaly  Extremities: no lower extremity edema  Skin: no rashes, lesions, bruising, or petechiae  Msk:  Shows no weakness of the large muscle groups  Psych: Mood is stable        RECENT LABS:    Lab Results   Component Value Date    HGB 8.4 (L) 08/15/2022    HCT 27.0 (L) 08/15/2022    .1 (H) 08/15/2022     08/15/2022    WBC 4.80 08/15/2022    NEUTROABS 3.00 08/15/2022    LYMPHSABS 1.40 08/15/2022    MONOSABS 0.40 08/15/2022    EOSABS 0.01 07/06/2022    BASOSABS " 0.04 07/06/2022       Lab Results   Component Value Date    GLUCOSE 134 (H) 07/27/2022    BUN 21 07/27/2022    CREATININE 0.90 06/07/2023     (L) 07/27/2022    K 4.4 07/27/2022    CL 98 07/27/2022    CO2 23.0 07/27/2022    CALCIUM 9.2 07/27/2022    PROTEINTOT 6.6 07/27/2022    ALBUMIN 3.80 07/27/2022    BILITOT 0.7 07/27/2022    ALKPHOS 55 07/27/2022    AST 17 07/27/2022    ALT 13 07/27/2022     CT Chest With Contrast Diagnostic    Result Date: 6/7/2023  Impression: 1. New left lower lobe volume loss with an area of increasing masslike consolidation in the left infrahilar region. On the previous study, there was a discrete lung mass in the left infrahilar region which has been previously biopsied. On today's study, the masslike consolidation extends along the bronchovascular structures measuring 3.0 x 3.3 cm in diameter. This is concerning for malignancy. I would consider bronchoscopic evaluation and a repeat biopsy. 2. There are postsurgical changes in both breasts. 3. There is a combination of chronic scarring and subsegmental atelectasis in both lungs. 4. There are healing fractures of the posterior 10th through 12th ribs on the right side at the costovertebral angle. 5. No evidence of metastatic disease within the abdomen or pelvis. 6. Multiple additional findings in the chest, abdomen, and pelvis as noted above. Electronically Signed: Feliciano Morfin  6/7/2023 10:55 AM EDT  Workstation ID: [id number]    CT Abdomen Pelvis With Contrast    Result Date: 6/7/2023  Impression: 1. New left lower lobe volume loss with an area of increasing masslike consolidation in the left infrahilar region. On the previous study, there was a discrete lung mass in the left infrahilar region which has been previously biopsied. On today's study, the masslike consolidation extends along the bronchovascular structures measuring 3.0 x 3.3 cm in diameter. This is concerning for malignancy. I would consider bronchoscopic evaluation and a  repeat biopsy. 2. There are postsurgical changes in both breasts. 3. There is a combination of chronic scarring and subsegmental atelectasis in both lungs. 4. There are healing fractures of the posterior 10th through 12th ribs on the right side at the costovertebral angle. 5. No evidence of metastatic disease within the abdomen or pelvis. 6. Multiple additional findings in the chest, abdomen, and pelvis as noted above. Electronically Signed: Feliciano Navjot  6/7/2023 10:55 AM EDT  Workstation ID: [id number]    Mammo Diagnostic Digital Tomosynthesis Bilateral With CAD    Result Date: 4/21/2023  Presumed postsurgical changes bilaterally, no mammographic findings worrisome for malignancy.  ACR BI-RADS CATEGORY:  3, PROBABLY BENIGN  RECOMMENDATION:  Six-month short-term interval bilateral mammographic follow-up according to the lumpectomy protocol.   CAD was utilized.  The standard false-negative rate of mammography is between 10% and 25%. Complex patterns or increased breast density will markedly elevate the false-negative rate of mammography.    A letter, in lay terminology, with the results of this exam was given to the patient at the time of the visit.  ________________________________________________________________________ _ Physician Order  Diagnostic 6 Month follow up Mammogram with Breast Ultrasound if needed.  Diagnosis: Abnormal Mammogram  This report was finalized on 4/21/2023 1:36 PM by Maegan Holloway MD.            Final Diagnosis   1.  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  See template #1    2.  LEFT BREAST, NEEDLE LOCALIZED LUMPECTOMY:                 Invasive  poorly differentiated ductal carcinoma (Pendleton score 8/9)                 Gross tumor size 28 mm                Background high-grade ductal carcinoma in situ with involvement of papillomatous lesion                 Invasive ductal carcinoma less than 1 mm from superior margin and medial margin                 Invasive ductal carcinoma 2 mm from inferior margin                 Ductal carcinoma in situ extends to posterior margin                 Biopsy cavity identified.  See template #2    3.  LEFT BREAST, EXTENDED MEDIAL, SUPERIOR, AND DEEP MARGIN:                 Focal residual carcinoma measuring 3 mm near anterior aspect                New anterior margin width is 9 mm    INVASIVE BREAST CANCER STAGING TEMPLATE #1    TYPE OF SPECIMEN/PROCEDURE: Needle localized lumpectomy  SPECIMEN LATERALITY: Right  TUMOR SITE: 11:00  TUMOR SIZE: 11 mm  TUMOR FOCALITY: Single focus  HISTOLOGIC TYPE OF INVASIVE CARCINOMA: Ductal with apocrine morphology  rdGrdRrdArdDrdErd:rd rd3rd Tubule formation: 3                Mitotic activity: 1                Pleomorphism: 3  OVERALL SCORE: 7/9  DUCTAL CARCINOMA IN SITU EXTENT: Focal  TUMOR EXTENSION: Structures not present                Skin: N/A                Skin satellite nodule: N/A                Nipple: N/a                Skeletal muscle: N/A  SURGICAL MARGINS: Invasive carcinoma present at margin                Invasive carcinoma margins: Tumor present at margin                Distance from closest margin: 0 mm                Specific closest margin: Posterior                   DCIS margins: Uninvolved by DCIS                Distance from closest margin: 6 mm                Specific closest margin: Posterior    LYMPH NODE STATUS: None submitted                Number of lymph nodes with macrometastasis: N/A                Number of lymph nodes with micrometastasis: N/A                Number of lymph nodes with isolated tumor cells: N/A  TOTAL NUMBER OF LYMPH NODES EXAMINED:  0  NUMBER OF SENTINEL NODES EXAMINED: 0  EXTRANODAL EXTENSION OF TUMOR: N/A  SIZE OF LARGEST ENOCH METASTATIC DEPOSIT: N/A  VASCULAR/LYMPHATIC INVASION: N/A  DISTANT SITES INVOLVED: Not applicable  ESTROGEN RECEPTOR STATUS BY IHC METHOD: Negative per previous biopsy  PROGESTERONE RECEPTOR STATUS BY IHC METHOD: Negative per previous biopsy  HER-2/LAURA ONCOPROTEIN STATUS BY IHC METHOD: Equivocal (2+) per previous biopsy  HER-2/LAURA ONCOGENE STATUS BY IN SITU HYBRIDIZATION ANALYSIS: Not amplified  BIOPSY UNDER WHICH BREAST BIOMARKERS PERFORMED: SX   TREATMENT EFFECT (BREAST): No known presurgical therapy  TREATMENT EFFECT (LYMPH NODE): No known presurgical therapy  ADDITIONAL PATHOLOGIC FINDINGS: Fibrocystic change  OTHER STUDIES: Available upon request  AJCC PATHOLOGIC STAGE: (COMPLETED BY PATHOLOGIST, BASED ONLY ON TISSUE FINDINGS; MORE EXTENSIVE DISEASE MAY NOT BE KNOWN TO THE PATHOLOGIST)  pT = 1c  pN = x  pM = N/A      INVASIVE BREAST CANCER STAGING TEMPLATE #2    TYPE OF SPECIMEN/PROCEDURE: Needle localized lumpectomy  SPECIMEN LATERALITY: Left  TUMOR SITE: 2:00  TUMOR SIZE: 28 mm  TUMOR FOCALITY: Single focus  HISTOLOGIC TYPE OF INVASIVE CARCINOMA: Ductal  ndGndRndAndDndEnd:nd nd2nd Tubule formation: 3                Mitotic activity: 2                Pleomorphism: 3  OVERALL SCORE: 8/9  DUCTAL CARCINOMA IN SITU EXTENT: 12 mm  TUMOR EXTENSION: Structures not present                Skin: N/A                Skin satellite nodule: N/A                Nipple: N/A                Skeletal muscle: N/A  SURGICAL MARGINS: Final margins uninvolved by in situ or invasive neoplasm                Invasive carcinoma margins: Extended margin uninvolved                Distance from closest margin: Extended margin 9 mm                Specific closest margin: Anterior                   DCIS margins: Uninvolved                Distance from closest margin: 12 mm                Specific closest margin: Posterior    LYMPH NODE  STATUS: No lymph nodes submitted                Number of lymph nodes with macrometastasis: N/A                Number of lymph nodes with micrometastasis: N/A                Number of lymph nodes with isolated tumor cells: N/A  TOTAL NUMBER OF LYMPH NODES EXAMINED: 0  NUMBER OF SENTINEL NODES EXAMINED: 0  EXTRANODAL EXTENSION OF TUMOR: N/A  SIZE OF LARGEST ENOCH METASTATIC DEPOSIT: N/A  VASCULAR/LYMPHATIC INVASION: N/A  DISTANT SITES INVOLVED: N/A  ESTROGEN RECEPTOR STATUS BY IHC METHOD: Positive per previous biopsy  PROGESTERONE RECEPTOR STATUS BY IHC METHOD: Negative per previous biopsy  HER-2/LAURA ONCOPROTEIN STATUS BY IHC METHOD: Negative (0+) per previous biopsy   HER-2/LAURA ONCOGENE STATUS BY IN SITU HYBRIDIZATION ANALYSIS: Not performed  BIOPSY UNDER WHICH BREAST BIOMARKERS PERFORMED: [id number]  TREATMENT EFFECT (BREAST): No known presurgical therapy  TREATMENT EFFECT (LYMPH NODE): No known presurgical therapy  ADDITIONAL PATHOLOGIC FINDINGS: None significant  OTHER STUDIES: Available upon request  AJCC PATHOLOGIC STAGE: (COMPLETED BY PATHOLOGIST, BASED ONLY ON TISSUE FINDINGS; MORE EXTENSIVE DISEASE MAY NOT BE KNOWN TO THE PATHOLOGIST)  pT = 2  pN = x  pM = not applicable        CT Chest With Contrast Diagnostic    Result Date: 6/7/2023  Impression: 1. New left lower lobe volume loss with an area of increasing masslike consolidation in the left infrahilar region. On the previous study, there was a discrete lung mass in the left infrahilar region which has been previously biopsied. On today's study, the masslike consolidation extends along the bronchovascular structures measuring 3.0 x 3.3 cm in diameter. This is concerning for malignancy. I would consider bronchoscopic evaluation and a repeat biopsy. 2. There are postsurgical changes in both breasts. 3. There is a combination of chronic scarring and subsegmental atelectasis in both lungs. 4. There are healing fractures of the posterior 10th through 12th ribs  on the right side at the costovertebral angle. 5. No evidence of metastatic disease within the abdomen or pelvis. 6. Multiple additional findings in the chest, abdomen, and pelvis as noted above. Electronically Signed: Feliciano Navjot  6/7/2023 10:55 AM EDT  Workstation ID: [id number]    CT Abdomen Pelvis With Contrast    Result Date: 6/7/2023  Impression: 1. New left lower lobe volume loss with an area of increasing masslike consolidation in the left infrahilar region. On the previous study, there was a discrete lung mass in the left infrahilar region which has been previously biopsied. On today's study, the masslike consolidation extends along the bronchovascular structures measuring 3.0 x 3.3 cm in diameter. This is concerning for malignancy. I would consider bronchoscopic evaluation and a repeat biopsy. 2. There are postsurgical changes in both breasts. 3. There is a combination of chronic scarring and subsegmental atelectasis in both lungs. 4. There are healing fractures of the posterior 10th through 12th ribs on the right side at the costovertebral angle. 5. No evidence of metastatic disease within the abdomen or pelvis. 6. Multiple additional findings in the chest, abdomen, and pelvis as noted above. Electronically Signed: Feliciano Navjot  6/7/2023 10:55 AM EDT  Workstation ID: [id number]    Mammo Diagnostic Digital Tomosynthesis Bilateral With CAD    Result Date: 4/21/2023  Presumed postsurgical changes bilaterally, no mammographic findings worrisome for malignancy.  ACR BI-RADS CATEGORY:  3, PROBABLY BENIGN  RECOMMENDATION:  Six-month short-term interval bilateral mammographic follow-up according to the lumpectomy protocol.   CAD was utilized.  The standard false-negative rate of mammography is between 10% and 25%. Complex patterns or increased breast density will markedly elevate the false-negative rate of mammography.    A letter, in lay terminology, with the results of this exam was given to the patient at the time of  the visit.  ________________________________________________________________________ _ Physician Order  Diagnostic 6 Month follow up Mammogram with Breast Ultrasound if needed.  Diagnosis: Abnormal Mammogram  This report was finalized on 4/21/2023 1:36 PM by Maegan Holloway MD.          Assessment/Plan    1.  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   completed adjuvant chemotherapy and has now completed  adjuvant radiotherapy.  Mammogram in April looked reasonable.  Currently on letrozole adjuvantly.    2.  Metastatic endometrial cancer.  She had a single focus of disease in her lung.  This was treated with CyberKnife.  I plan to keep her on Femara for now.  She is estrogen receptor positive on the biopsy.  I looked at the scans and actually reviewed them with Dr. Ortega today.  This appears to be consistent with radiation changes to the region after CyberKnife.  Recommend repeating scans in 3 months.      Ruby Trevino MD  [facility]    Return on: 09/06/23  Return in (Approximately): 3 months    Orders Placed This Encounter   Procedures    CT Chest With Contrast Diagnostic       6/7/2023

## 2023-08-22 ENCOUNTER — HOSPITAL ENCOUNTER (OUTPATIENT)
Age: 78
End: 2023-08-22
Payer: MEDICARE

## 2023-08-22 DIAGNOSIS — D64.9: ICD-10-CM

## 2023-08-22 DIAGNOSIS — K74.3: Primary | ICD-10-CM

## 2023-08-22 LAB
ALBUMIN LEVEL: 3.8 G/DL (ref 3.5–5)
ALBUMIN/GLOB SERPL: 1.1 {RATIO} (ref 1.1–1.8)
ALP ISO SERPL-ACNC: 88 U/L (ref 38–126)
ALT SERPLBLD-CCNC: 27 U/L (ref 12–78)
ANION GAP SERPL CALC-SCNC: 15 MEQ/L (ref 5–15)
AST SERPL QL: 36 U/L (ref 14–36)
BILIRUBIN,TOTAL: 0.4 MG/DL (ref 0.2–1.3)
BUN SERPL-MCNC: 26 MG/DL (ref 7–17)
CALCIUM SPEC-MCNC: 8.7 MG/DL (ref 8.4–10.2)
CHLORIDE SPEC-SCNC: 99 MMOL/L (ref 98–107)
CO2 SERPL-SCNC: 23 MMOL/L (ref 22–30)
CREAT BLD-SCNC: 1.4 MG/DL (ref 0.52–1.04)
ESTIMATED GLOMERULAR FILT RATE: 37 ML/MIN (ref 60–?)
GFR (AFRICAN AMERICAN): 44 ML/MIN (ref 60–?)
GLOBULIN SER CALC-MCNC: 3.5 G/DL (ref 1.3–3.2)
GLUCOSE: 116 MG/DL (ref 74–100)
HCT VFR BLD CALC: 33.5 % (ref 37–47)
HGB BLD-MCNC: 11.1 G/DL (ref 12.2–16.2)
INR PPP: 1.01 (ref 0.9–1.1)
MCHC RBC-ENTMCNC: 33 G/DL (ref 31.8–35.4)
MCV RBC: 93.8 FL (ref 81–99)
MEAN CORPUSCULAR HEMOGLOBIN: 30.9 PG (ref 27–31.2)
PLATELET # BLD: 86 K/MM3 (ref 142–424)
POTASSIUM: 5 MMOL/L (ref 3.5–5.1)
PROT SERPL-MCNC: 7.3 G/DL (ref 6.3–8.2)
PT BLD: 10.9 SECONDS (ref 10.1–12.5)
RBC # BLD AUTO: 3.57 M/MM3 (ref 4.2–5.4)
SODIUM SPEC-SCNC: 132 MMOL/L (ref 136–145)
WBC # BLD AUTO: 5.4 K/MM3 (ref 4.8–10.8)

## 2023-08-22 PROCEDURE — 80053 COMPREHEN METABOLIC PANEL: CPT

## 2023-08-22 PROCEDURE — 85610 PROTHROMBIN TIME: CPT

## 2023-08-22 PROCEDURE — 36415 COLL VENOUS BLD VENIPUNCTURE: CPT

## 2023-08-22 PROCEDURE — 85025 COMPLETE CBC W/AUTO DIFF WBC: CPT

## 2023-09-06 ENCOUNTER — HOSPITAL ENCOUNTER (OUTPATIENT)
Dept: CT IMAGING | Facility: HOSPITAL | Age: 78
Discharge: HOME | End: 2023-09-06
Admitting: INTERNAL MEDICINE
Payer: MEDICARE

## 2023-09-06 ENCOUNTER — OFFICE VISIT (OUTPATIENT)
Dept: ONCOLOGY | Facility: CLINIC | Age: 78
End: 2023-09-06
Payer: MEDICARE

## 2023-09-06 VITALS
WEIGHT: 153 LBS | HEIGHT: 63 IN | TEMPERATURE: 97.3 F | OXYGEN SATURATION: 97 % | BODY MASS INDEX: 27.11 KG/M2 | RESPIRATION RATE: 24 BRPM | SYSTOLIC BLOOD PRESSURE: 139 MMHG | HEART RATE: 54 BPM | DIASTOLIC BLOOD PRESSURE: 68 MMHG

## 2023-09-06 DIAGNOSIS — Z17.0 MALIGNANT NEOPLASM OF UPPER-OUTER QUADRANT OF BOTH BREASTS IN FEMALE, ESTROGEN RECEPTOR POSITIVE: Primary | ICD-10-CM

## 2023-09-06 DIAGNOSIS — C50.412 MALIGNANT NEOPLASM OF UPPER-OUTER QUADRANT OF BOTH BREASTS IN FEMALE, ESTROGEN RECEPTOR POSITIVE: Primary | ICD-10-CM

## 2023-09-06 DIAGNOSIS — C50.411 MALIGNANT NEOPLASM OF UPPER-OUTER QUADRANT OF BOTH BREASTS IN FEMALE, ESTROGEN RECEPTOR POSITIVE: Primary | ICD-10-CM

## 2023-09-06 DIAGNOSIS — R91.8 MASS OF LEFT LUNG: ICD-10-CM

## 2023-09-06 LAB — CREAT BLDA-MCNC: 1 MG/DL (ref 0.6–1.3)

## 2023-09-06 PROCEDURE — 99214 OFFICE O/P EST MOD 30 MIN: CPT | Performed by: INTERNAL MEDICINE

## 2023-09-06 PROCEDURE — 71260 CT THORAX DX C+: CPT

## 2023-09-06 PROCEDURE — 82565 ASSAY OF CREATININE: CPT

## 2023-09-06 PROCEDURE — 1126F AMNT PAIN NOTED NONE PRSNT: CPT | Performed by: INTERNAL MEDICINE

## 2023-09-06 PROCEDURE — 25510000001 IOPAMIDOL 61 % SOLUTION: Performed by: INTERNAL MEDICINE

## 2023-09-06 RX ADMIN — IOPAMIDOL 75 ML: 612 INJECTION, SOLUTION INTRAVENOUS at 09:21

## 2023-09-06 NOTE — PROGRESS NOTES
PROBLEM LIST:  Oncology/Hematology History   Endometrial cancer   9/30/2011 Imaging    TVUS and CT scan for palpable left pelvic mass upon annual exam and new abdominal symptoms.      10/6/2011 Surgery    ADY/BSO with pelvic lymph node dissection. Stage 1A grade 2 endometrial adenocarcinoma by final pathology     1/26/2022 Imaging    IMPRESSION:     Hypermetabolic soft tissue nodule in the left breast compatible with  biopsy-proven invasive ductal carcinoma. There is diffuse low level FDG  uptake in the region of recent right breast biopsy site. A  hypermetabolic mass in the left lower lobe is suspicious for pulmonary  metastasis. No additional sites of metastases are identified. There is  no evidence of discrete/focal hypermetabolic lesion of the sternum to  correspond with the indeterminant sternal lesion described on breast MRI  exam.     2/14/2022 Biopsy    Final Diagnosis   LEFT LUNG, BIOPSY:  Metastatic adenocarcinoma.  See comment.  GJK   Electronically signed by Wolfgang Hair MD on 2/22/2022 at 1154   Comment    Sections show small fragments of tumor that are morphologically distinct from the patient's known breast cancers.  Immunoprofile suggests mullerian origin.  Clinical correlation required.  This case was shown for intradepartmental consultation and the other pathologist concurs with the findings interpretation.  This case was discussed with Dr. Trevino on 02/21/22.  This case was discussed with Dr. Reed on 02/22/22.        5/4/2022 - 7/13/2022 Chemotherapy    OP GYN PACLitaxel / CARBOplatin AUC=2 (Weekly)     5/4/2022 -  Chemotherapy    OP CENTRAL VENOUS ACCESS DEVICE ACCESS, CARE, AND MAINTENANCE ([facility])     Malignant neoplasm of upper-outer quadrant of both breasts in female, estrogen receptor positive   1/21/2022 Initial Diagnosis    Malignant neoplasm of upper-outer quadrant of both breasts in female, estrogen receptor positive (HCC)     1/24/2022 Biopsy    1.  Right breast cancer-invasive  ductal carcinoma grade 2-ER negative AL negative HER-2 negative    2.  Left breast cancer-invasive ductal carcinoma grade 2-ER positive AL negative HER-2 negative     1/26/2022 Imaging    EXAMINATION: NM PET/CT SKULL BASE TO MID THIGH      FINDINGS:      Today's 3-D images demonstrate focal hypermetabolism in the soft tissues  of the left breast as well as focal hypermetabolism within the left  midlung.     Multiplanar images of the head and neck demonstrate symmetric FDG uptake  within the brain. There is no evidence of hypermetabolic neck mass or  lymph node.     In the upper outer quadrant of the left breast there is redemonstration  of a irregular 2.1 cm soft tissue nodule with FDG hypermetabolism of SUV  max 4.2, compatible with biopsy-proven invasive ductal carcinoma. There  is an ill-defined diffuse region of low-level hypermetabolism in the  right breast with adjacent biopsy clip and single locule of soft tissue  air, compatible with recent right breast biopsy. A discrete  hypermetabolic nodule or mass in the right breast is not confidently  identified. There is no hypermetabolic or enlarged axillary lymph nodes.     Within the chest there is redemonstration of a lobulated soft tissue  mass in the superior aspect of the left lower lobe which appears  hypermetabolic with SUV max 6.5. There is no evidence of hypermetabolic  mediastinal or hilar adenopathy.     Below the diaphragm there is expected physiologic uptake within the   and GI tracts. No evidence of abdominal pelvic malignancy or metastasis.  No hypermetabolic abdominopelvic lymph nodes. Photopenic left renal cyst  is noted.     There is no hypermetabolic pathologic marrow process. Specifically,  there is no evidence of hypermetabolic lesion of the sternum to  correspond with the indeterminant sternal lesion detailed on prior  breast MRI exam. FDG uptake at the left antecubital fossa reflects site  of IV administration.     IMPRESSION:      Hypermetabolic soft tissue nodule in the left breast compatible with  biopsy-proven invasive ductal carcinoma. There is diffuse low level FDG  uptake in the region of recent right breast biopsy site. A  hypermetabolic mass in the left lower lobe is suspicious for pulmonary  metastasis. No additional sites of metastases are identified. There is  no evidence of discrete/focal hypermetabolic lesion of the sternum to  correspond with the indeterminant sternal lesion described on breast MRI  exam.           4/20/2022 Cancer Staged    Staging form: Breast, AJCC 8th Edition  - Pathologic: Stage IB (pT1c, pN0, cM0, G3, ER-, VA-, HER2-) - Signed by Ruby Trevino MD on 4/20/2022 5/4/2022 -  Chemotherapy    OP CENTRAL VENOUS ACCESS DEVICE ACCESS, CARE, AND MAINTENANCE ([facility])     Malignant neoplasm metastatic to left lung (Resolved)   3/23/2022 Initial Diagnosis    Malignant neoplasm metastatic to left lung (HCC) (Resolved)     4/26/2022 - 4/26/2022 Radiation    Radiation OncologyTreatment Course:  Kenisha Jama received 2500 cGy in 1 fraction to left lung metastasis via Stereotactic Radiation Therapy - SRT.         REASON FOR VISIT: Management of my cancers     HISTORY OF PRESENT ILLNESS:   77 y.o.  female presents today for follow-up.  She completed carboplatin and Taxol adjuvantly for her bilateral breast cancer.  Her counts did not tolerate her treatment well.  Subsequently I sent her to [place] for radiotherapy and she completed radiotherapy.  She also underwent CyberKnife to the solitary lesion in the lung from endometrial cancer.  She is currently on letrozole alone for now for her endometrial cancer.  She presents after having scans done.  She is tolerating letrozole reasonably well.  Denies any issues with joint complaints.  No cough.  No issues with pain      Past medical history, social history and family history was reviewed 09/06/23 and unchanged from prior visit.    Review of  Systems:    Review of Systems   Constitutional:  Positive for fatigue.   HENT:  Negative.     Eyes: Negative.    Respiratory: Negative.     Cardiovascular: Negative.    Gastrointestinal: Negative.    Endocrine: Negative.    Genitourinary: Negative.     Musculoskeletal: Negative.    Skin: Negative.    Neurological: Negative.    Hematological: Negative.    Psychiatric/Behavioral: Negative.            Medications:        Current Outpatient Medications:     acetaminophen (Tylenol 8 Hour) 650 MG 8 hr tablet, Take 1 tablet by mouth Every 8 (Eight) Hours As Needed for Mild Pain ., Disp: 40 tablet, Rfl: 0    atorvastatin (LIPITOR) 20 MG tablet, Take 1 tablet by mouth Every Evening., Disp: , Rfl:     carvedilol (COREG) 12.5 MG tablet, Take 1 tablet by mouth 2 (Two) Times a Day With Meals., Disp: , Rfl:     ezetimibe (ZETIA) 10 MG tablet, Take 1 tablet by mouth Every Night., Disp: , Rfl:     ferrous sulfate 325 (65 FE) MG tablet, Take 1 tablet by mouth Daily With Breakfast., Disp: , Rfl:     ibuprofen (ADVIL,MOTRIN) 600 MG tablet, Take 1 tablet by mouth Every 6 (Six) Hours As Needed for Mild Pain ., Disp: 15 tablet, Rfl: 0    ketorolac (ACULAR) 0.5 % ophthalmic solution, , Disp: , Rfl:     letrozole (FEMARA) 2.5 MG tablet, TAKE 1 TABLET BY MOUTH DAILY, Disp: 90 tablet, Rfl: 3    lidocaine-prilocaine (EMLA) 2.5-2.5 % cream, Apply 1 application topically to the appropriate area as directed As Needed for Injection Site Pain. Apply 45-60 minutes before port access, cover with plastic wrap after application., Disp: 5 g, Rfl: 5    lisinopril (PRINIVIL,ZESTRIL) 40 MG tablet, Take 1 tablet by mouth Daily., Disp: , Rfl:     mupirocin (BACTROBAN) 2 % ointment, APPLY TOPICALLY TO THE AFFECTED AREA(S) THREE TIMES DAILY FOR 7 DAYS, Disp: , Rfl:     NIFEdipine CC (ADALAT CC) 30 MG 24 hr tablet, Take 1 tablet by mouth Every Morning., Disp: , Rfl:     NIFEdipine XL (PROCARDIA XL) 30 MG 24 hr tablet, Take 1 tablet by mouth Daily., Disp: ,  "Rfl:     pantoprazole (PROTONIX) 40 MG EC tablet, , Disp: , Rfl:     pravastatin (PRAVACHOL) 80 MG tablet, , Disp: , Rfl: 1    ursodiol (ACTIGALL) 500 MG tablet, , Disp: , Rfl:     Current Facility-Administered Medications:     lidocaine (XYLOCAINE) 1 % injection 5 mL, 5 mL, Infiltration, Once, Svetlana Juárez MD    Pain Medications               acetaminophen (Tylenol 8 Hour) 650 MG 8 hr tablet Take 1 tablet by mouth Every 8 (Eight) Hours As Needed for Mild Pain .    ibuprofen (ADVIL,MOTRIN) 600 MG tablet Take 1 tablet by mouth Every 6 (Six) Hours As Needed for Mild Pain .               ALLERGIES:  No Known Allergies      Physical Exam    VITAL SIGNS:  /68 Comment: RUE  Pulse 54   Temp 97.3 °F (36.3 °C) (Infrared)   Resp 24   Ht 160 cm (63\")   Wt 69.4 kg (153 lb)   SpO2 97% Comment: RA  BMI 27.10 kg/m²     ECOG score: 0           Wt Readings from Last 3 Encounters:   09/06/23 69.4 kg (153 lb)   06/07/23 70.3 kg (155 lb)   03/08/23 70.8 kg (156 lb)       Body mass index is 27.1 kg/m². Body surface area is 1.73 meters squared.    Pain Score    09/06/23 1040   PainSc: 0-No pain           Performance Status: 0    General: well appearing, in no acute distress  HEENT: sclera anicteric, neck is supple  Lymphatics: no cervical, supraclavicular, or axillary adenopathy  Cardiovascular: regular rate and rhythm, no murmurs, rubs or gallops  Lungs: clear to auscultation bilaterally  Abdomen: soft, nontender, nondistended.  No palpable organomegaly  Extremities: no lower extremity edema  Skin: no rashes, lesions, bruising, or petechiae  Msk:  Shows no weakness of the large muscle groups  Psych: Mood is stable        RECENT LABS:    Lab Results   Component Value Date    HGB 8.4 (L) 08/15/2022    HCT 27.0 (L) 08/15/2022    .1 (H) 08/15/2022     08/15/2022    WBC 4.80 08/15/2022    NEUTROABS 3.00 08/15/2022    LYMPHSABS 1.40 08/15/2022    MONOSABS 0.40 08/15/2022    EOSABS 0.01 07/06/2022    BASOSABS " 0.04 07/06/2022       Lab Results   Component Value Date    GLUCOSE 134 (H) 07/27/2022    BUN 21 07/27/2022    CREATININE 0.90 06/07/2023     (L) 07/27/2022    K 4.4 07/27/2022    CL 98 07/27/2022    CO2 23.0 07/27/2022    CALCIUM 9.2 07/27/2022    PROTEINTOT 6.6 07/27/2022    ALBUMIN 3.80 07/27/2022    BILITOT 0.7 07/27/2022    ALKPHOS 55 07/27/2022    AST 17 07/27/2022    ALT 13 07/27/2022     CT Chest With Contrast Diagnostic    Result Date: 6/7/2023  Impression: 1. New left lower lobe volume loss with an area of increasing masslike consolidation in the left infrahilar region. On the previous study, there was a discrete lung mass in the left infrahilar region which has been previously biopsied. On today's study, the masslike consolidation extends along the bronchovascular structures measuring 3.0 x 3.3 cm in diameter. This is concerning for malignancy. I would consider bronchoscopic evaluation and a repeat biopsy. 2. There are postsurgical changes in both breasts. 3. There is a combination of chronic scarring and subsegmental atelectasis in both lungs. 4. There are healing fractures of the posterior 10th through 12th ribs on the right side at the costovertebral angle. 5. No evidence of metastatic disease within the abdomen or pelvis. 6. Multiple additional findings in the chest, abdomen, and pelvis as noted above. Electronically Signed: Feliciano Morfin  6/7/2023 10:55 AM EDT  Workstation ID: [id number]    CT Abdomen Pelvis With Contrast    Result Date: 6/7/2023  Impression: 1. New left lower lobe volume loss with an area of increasing masslike consolidation in the left infrahilar region. On the previous study, there was a discrete lung mass in the left infrahilar region which has been previously biopsied. On today's study, the masslike consolidation extends along the bronchovascular structures measuring 3.0 x 3.3 cm in diameter. This is concerning for malignancy. I would consider bronchoscopic evaluation and a  repeat biopsy. 2. There are postsurgical changes in both breasts. 3. There is a combination of chronic scarring and subsegmental atelectasis in both lungs. 4. There are healing fractures of the posterior 10th through 12th ribs on the right side at the costovertebral angle. 5. No evidence of metastatic disease within the abdomen or pelvis. 6. Multiple additional findings in the chest, abdomen, and pelvis as noted above. Electronically Signed: Feliciano Navjot  6/7/2023 10:55 AM EDT  Workstation ID: [id number]    Mammo Diagnostic Digital Tomosynthesis Bilateral With CAD    Result Date: 4/21/2023  Presumed postsurgical changes bilaterally, no mammographic findings worrisome for malignancy.  ACR BI-RADS CATEGORY:  3, PROBABLY BENIGN  RECOMMENDATION:  Six-month short-term interval bilateral mammographic follow-up according to the lumpectomy protocol.   CAD was utilized.  The standard false-negative rate of mammography is between 10% and 25%. Complex patterns or increased breast density will markedly elevate the false-negative rate of mammography.    A letter, in lay terminology, with the results of this exam was given to the patient at the time of the visit.  ________________________________________________________________________ _ Physician Order  Diagnostic 6 Month follow up Mammogram with Breast Ultrasound if needed.  Diagnosis: Abnormal Mammogram  This report was finalized on 4/21/2023 1:36 PM by Maegan Holloway MD.            Final Diagnosis   1.  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  See template #1    2.  LEFT BREAST, NEEDLE LOCALIZED LUMPECTOMY:                 Invasive  poorly differentiated ductal carcinoma (Kennerdell score 8/9)                 Gross tumor size 28 mm                Background high-grade ductal carcinoma in situ with involvement of papillomatous lesion                 Invasive ductal carcinoma less than 1 mm from superior margin and medial margin                 Invasive ductal carcinoma 2 mm from inferior margin                 Ductal carcinoma in situ extends to posterior margin                 Biopsy cavity identified.  See template #2    3.  LEFT BREAST, EXTENDED MEDIAL, SUPERIOR, AND DEEP MARGIN:                 Focal residual carcinoma measuring 3 mm near anterior aspect                New anterior margin width is 9 mm    INVASIVE BREAST CANCER STAGING TEMPLATE #1    TYPE OF SPECIMEN/PROCEDURE: Needle localized lumpectomy  SPECIMEN LATERALITY: Right  TUMOR SITE: 11:00  TUMOR SIZE: 11 mm  TUMOR FOCALITY: Single focus  HISTOLOGIC TYPE OF INVASIVE CARCINOMA: Ductal with apocrine morphology  rdGrdRrdArdDrdErd:rd rd3rd Tubule formation: 3                Mitotic activity: 1                Pleomorphism: 3  OVERALL SCORE: 7/9  DUCTAL CARCINOMA IN SITU EXTENT: Focal  TUMOR EXTENSION: Structures not present                Skin: N/A                Skin satellite nodule: N/A                Nipple: N/a                Skeletal muscle: N/A  SURGICAL MARGINS: Invasive carcinoma present at margin                Invasive carcinoma margins: Tumor present at margin                Distance from closest margin: 0 mm                Specific closest margin: Posterior                   DCIS margins: Uninvolved by DCIS                Distance from closest margin: 6 mm                Specific closest margin: Posterior    LYMPH NODE STATUS: None submitted                Number of lymph nodes with macrometastasis: N/A                Number of lymph nodes with micrometastasis: N/A                Number of lymph nodes with isolated tumor cells: N/A  TOTAL NUMBER OF LYMPH NODES EXAMINED:  0  NUMBER OF SENTINEL NODES EXAMINED: 0  EXTRANODAL EXTENSION OF TUMOR: N/A  SIZE OF LARGEST ENOCH METASTATIC DEPOSIT: N/A  VASCULAR/LYMPHATIC INVASION: N/A  DISTANT SITES INVOLVED: Not applicable  ESTROGEN RECEPTOR STATUS BY IHC METHOD: Negative per previous biopsy  PROGESTERONE RECEPTOR STATUS BY IHC METHOD: Negative per previous biopsy  HER-2/LAURA ONCOPROTEIN STATUS BY IHC METHOD: Equivocal (2+) per previous biopsy  HER-2/LAURA ONCOGENE STATUS BY IN SITU HYBRIDIZATION ANALYSIS: Not amplified  BIOPSY UNDER WHICH BREAST BIOMARKERS PERFORMED: SX   TREATMENT EFFECT (BREAST): No known presurgical therapy  TREATMENT EFFECT (LYMPH NODE): No known presurgical therapy  ADDITIONAL PATHOLOGIC FINDINGS: Fibrocystic change  OTHER STUDIES: Available upon request  AJCC PATHOLOGIC STAGE: (COMPLETED BY PATHOLOGIST, BASED ONLY ON TISSUE FINDINGS; MORE EXTENSIVE DISEASE MAY NOT BE KNOWN TO THE PATHOLOGIST)  pT = 1c  pN = x  pM = N/A      INVASIVE BREAST CANCER STAGING TEMPLATE #2    TYPE OF SPECIMEN/PROCEDURE: Needle localized lumpectomy  SPECIMEN LATERALITY: Left  TUMOR SITE: 2:00  TUMOR SIZE: 28 mm  TUMOR FOCALITY: Single focus  HISTOLOGIC TYPE OF INVASIVE CARCINOMA: Ductal  ndGndRndAndDndEnd:nd nd2nd Tubule formation: 3                Mitotic activity: 2                Pleomorphism: 3  OVERALL SCORE: 8/9  DUCTAL CARCINOMA IN SITU EXTENT: 12 mm  TUMOR EXTENSION: Structures not present                Skin: N/A                Skin satellite nodule: N/A                Nipple: N/A                Skeletal muscle: N/A  SURGICAL MARGINS: Final margins uninvolved by in situ or invasive neoplasm                Invasive carcinoma margins: Extended margin uninvolved                Distance from closest margin: Extended margin 9 mm                Specific closest margin: Anterior                   DCIS margins: Uninvolved                Distance from closest margin: 12 mm                Specific closest margin: Posterior    LYMPH NODE  STATUS: No lymph nodes submitted                Number of lymph nodes with macrometastasis: N/A                Number of lymph nodes with micrometastasis: N/A                Number of lymph nodes with isolated tumor cells: N/A  TOTAL NUMBER OF LYMPH NODES EXAMINED: 0  NUMBER OF SENTINEL NODES EXAMINED: 0  EXTRANODAL EXTENSION OF TUMOR: N/A  SIZE OF LARGEST ENOCH METASTATIC DEPOSIT: N/A  VASCULAR/LYMPHATIC INVASION: N/A  DISTANT SITES INVOLVED: N/A  ESTROGEN RECEPTOR STATUS BY IHC METHOD: Positive per previous biopsy  PROGESTERONE RECEPTOR STATUS BY IHC METHOD: Negative per previous biopsy  HER-2/LAURA ONCOPROTEIN STATUS BY IHC METHOD: Negative (0+) per previous biopsy   HER-2/LAURA ONCOGENE STATUS BY IN SITU HYBRIDIZATION ANALYSIS: Not performed  BIOPSY UNDER WHICH BREAST BIOMARKERS PERFORMED: [id number]  TREATMENT EFFECT (BREAST): No known presurgical therapy  TREATMENT EFFECT (LYMPH NODE): No known presurgical therapy  ADDITIONAL PATHOLOGIC FINDINGS: None significant  OTHER STUDIES: Available upon request  AJCC PATHOLOGIC STAGE: (COMPLETED BY PATHOLOGIST, BASED ONLY ON TISSUE FINDINGS; MORE EXTENSIVE DISEASE MAY NOT BE KNOWN TO THE PATHOLOGIST)  pT = 2  pN = x  pM = not applicable      CT Chest With Contrast Diagnostic    Result Date: 9/6/2023  Impression: 1.Stable exam without evidence of disease progression. No superimposed acute process identified. Electronically Signed: Shad Anaya MD  9/6/2023 8:38 AM CDT  Workstation ID: [id number]    CT Chest With Contrast Diagnostic    Result Date: 6/7/2023  Impression: 1. New left lower lobe volume loss with an area of increasing masslike consolidation in the left infrahilar region. On the previous study, there was a discrete lung mass in the left infrahilar region which has been previously biopsied. On today's study, the masslike consolidation extends along the bronchovascular structures measuring 3.0 x 3.3 cm in diameter. This is concerning for malignancy. I would consider  bronchoscopic evaluation and a repeat biopsy. 2. There are postsurgical changes in both breasts. 3. There is a combination of chronic scarring and subsegmental atelectasis in both lungs. 4. There are healing fractures of the posterior 10th through 12th ribs on the right side at the costovertebral angle. 5. No evidence of metastatic disease within the abdomen or pelvis. 6. Multiple additional findings in the chest, abdomen, and pelvis as noted above. Electronically Signed: Feliciano Morfin  6/7/2023 10:55 AM EDT  Workstation ID: [id number]    CT Abdomen Pelvis With Contrast    Result Date: 6/7/2023  Impression: 1. New left lower lobe volume loss with an area of increasing masslike consolidation in the left infrahilar region. On the previous study, there was a discrete lung mass in the left infrahilar region which has been previously biopsied. On today's study, the masslike consolidation extends along the bronchovascular structures measuring 3.0 x 3.3 cm in diameter. This is concerning for malignancy. I would consider bronchoscopic evaluation and a repeat biopsy. 2. There are postsurgical changes in both breasts. 3. There is a combination of chronic scarring and subsegmental atelectasis in both lungs. 4. There are healing fractures of the posterior 10th through 12th ribs on the right side at the costovertebral angle. 5. No evidence of metastatic disease within the abdomen or pelvis. 6. Multiple additional findings in the chest, abdomen, and pelvis as noted above. Electronically Signed: Feliciano Morfin  6/7/2023 10:55 AM EDT  Workstation ID: [id number]        Assessment/Plan    1.  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   completed adjuvant chemotherapy and has now completed  adjuvant radiotherapy.  Mammogram in April looked reasonable.  Currently on letrozole adjuvantly.    2.  Metastatic endometrial cancer.  She had a single focus of disease in her lung.  This was  treated with CyberKnife.  I plan to keep her on Femara for now.  She is estrogen receptor positive on the biopsy.  I looked at the scans and actually reviewed them with Dr. Ortega today.  This appears to be consistent with radiation changes to the region after CyberKnife.  I looked at the scans which look stable.  I think it is reasonable to wait 6 months to scan her again.      Ruby Trevino MD  [facility]    Return on: 03/20/24  Return in (Approximately): 6 months    Orders Placed This Encounter   Procedures    CT Abdomen Pelvis With Contrast    CT Chest With Contrast Diagnostic       9/6/2023

## 2023-10-30 ENCOUNTER — HOSPITAL ENCOUNTER (OUTPATIENT)
Dept: MAMMOGRAPHY | Facility: HOSPITAL | Age: 78
Discharge: HOME | End: 2023-10-30
Admitting: SURGERY
Payer: MEDICARE

## 2023-10-30 DIAGNOSIS — Z09 FOLLOW-UP EXAM: ICD-10-CM

## 2023-10-30 PROCEDURE — 77066 DX MAMMO INCL CAD BI: CPT

## 2023-10-30 PROCEDURE — 77066 DX MAMMO INCL CAD BI: CPT | Performed by: RADIOLOGY

## 2023-11-10 ENCOUNTER — HOSPITAL ENCOUNTER (OUTPATIENT)
Dept: MRI IMAGING | Facility: HOSPITAL | Age: 78
Discharge: HOME | End: 2023-11-10
Admitting: INTERNAL MEDICINE
Payer: MEDICARE

## 2023-11-10 DIAGNOSIS — R92.8 ABNORMAL MAMMOGRAM: ICD-10-CM

## 2023-11-10 DIAGNOSIS — Z85.3 PERSONAL HISTORY OF MALIGNANT NEOPLASM OF BREAST: ICD-10-CM

## 2023-11-10 LAB — CREAT BLDA-MCNC: 0.9 MG/DL (ref 0.6–1.3)

## 2023-11-10 PROCEDURE — 0 GADOBENATE DIMEGLUMINE 529 MG/ML SOLUTION: Performed by: INTERNAL MEDICINE

## 2023-11-10 PROCEDURE — A9577 INJ MULTIHANCE: HCPCS | Performed by: INTERNAL MEDICINE

## 2023-11-10 PROCEDURE — C8908 MRI W/O FOL W/CONT, BREAST,: HCPCS

## 2023-11-10 PROCEDURE — 82565 ASSAY OF CREATININE: CPT

## 2023-11-10 PROCEDURE — C8937 CAD BREAST MRI: HCPCS

## 2023-11-10 RX ADMIN — GADOBENATE DIMEGLUMINE 14 ML: 529 INJECTION, SOLUTION INTRAVENOUS at 13:16

## 2023-12-04 ENCOUNTER — TELEPHONE (OUTPATIENT)
Dept: ONCOLOGY | Facility: CLINIC | Age: 78
End: 2023-12-04
Payer: MEDICARE

## 2023-12-04 NOTE — TELEPHONE ENCOUNTER
I called patient per Dr. Lieberman and he said the mri of breast was stable without evidence of disease.  Pt verbalized understanding.

## 2023-12-04 NOTE — TELEPHONE ENCOUNTER
Caller: Kenisha Jama    Relationship: Self    Best call back number: [phone]    What test was performed: RIGHT BREAST MRI    When was the test performed: 11/10/23    Where was the test performed: [facility]

## 2023-12-26 RX ORDER — LETROZOLE 2.5 MG/1
TABLET, FILM COATED ORAL
Qty: 90 TABLET | Refills: 3 | Status: SHIPPED | OUTPATIENT
Start: 2023-12-26

## 2024-03-20 ENCOUNTER — HOSPITAL ENCOUNTER (OUTPATIENT)
Dept: CT IMAGING | Facility: HOSPITAL | Age: 79
Discharge: HOME | End: 2024-03-20
Admitting: INTERNAL MEDICINE
Payer: MEDICARE

## 2024-03-20 ENCOUNTER — OFFICE VISIT (OUTPATIENT)
Dept: ONCOLOGY | Facility: CLINIC | Age: 79
End: 2024-03-20
Payer: MEDICARE

## 2024-03-20 VITALS
RESPIRATION RATE: 24 BRPM | BODY MASS INDEX: 27.29 KG/M2 | WEIGHT: 154 LBS | OXYGEN SATURATION: 99 % | TEMPERATURE: 97.5 F | SYSTOLIC BLOOD PRESSURE: 196 MMHG | HEART RATE: 54 BPM | DIASTOLIC BLOOD PRESSURE: 76 MMHG | HEIGHT: 63 IN

## 2024-03-20 DIAGNOSIS — Z17.0 MALIGNANT NEOPLASM OF UPPER-OUTER QUADRANT OF BOTH BREASTS IN FEMALE, ESTROGEN RECEPTOR POSITIVE: ICD-10-CM

## 2024-03-20 DIAGNOSIS — C50.411 MALIGNANT NEOPLASM OF UPPER-OUTER QUADRANT OF BOTH BREASTS IN FEMALE, ESTROGEN RECEPTOR POSITIVE: ICD-10-CM

## 2024-03-20 DIAGNOSIS — I26.99 OTHER ACUTE PULMONARY EMBOLISM WITHOUT ACUTE COR PULMONALE: Primary | ICD-10-CM

## 2024-03-20 DIAGNOSIS — C50.412 MALIGNANT NEOPLASM OF UPPER-OUTER QUADRANT OF BOTH BREASTS IN FEMALE, ESTROGEN RECEPTOR POSITIVE: ICD-10-CM

## 2024-03-20 LAB — CREAT BLDA-MCNC: 0.9 MG/DL (ref 0.6–1.3)

## 2024-03-20 PROCEDURE — 71260 CT THORAX DX C+: CPT

## 2024-03-20 PROCEDURE — 82565 ASSAY OF CREATININE: CPT

## 2024-03-20 PROCEDURE — 25510000001 IOPAMIDOL 61 % SOLUTION: Performed by: INTERNAL MEDICINE

## 2024-03-20 PROCEDURE — 74177 CT ABD & PELVIS W/CONTRAST: CPT

## 2024-03-20 RX ORDER — APIXABAN 5 MG (74)
5 KIT ORAL TAKE AS DIRECTED
Qty: 74 TABLET | Refills: 0 | Status: SHIPPED | OUTPATIENT
Start: 2024-03-20

## 2024-03-20 RX ADMIN — IOPAMIDOL 85 ML: 612 INJECTION, SOLUTION INTRAVENOUS at 10:20

## 2024-03-20 NOTE — PROGRESS NOTES
PROBLEM LIST:  Oncology/Hematology History   Endometrial cancer   9/30/2011 Imaging    TVUS and CT scan for palpable left pelvic mass upon annual exam and new abdominal symptoms.      10/6/2011 Surgery    ADY/BSO with pelvic lymph node dissection. Stage 1A grade 2 endometrial adenocarcinoma by final pathology     1/26/2022 Imaging    IMPRESSION:     Hypermetabolic soft tissue nodule in the left breast compatible with  biopsy-proven invasive ductal carcinoma. There is diffuse low level FDG  uptake in the region of recent right breast biopsy site. A  hypermetabolic mass in the left lower lobe is suspicious for pulmonary  metastasis. No additional sites of metastases are identified. There is  no evidence of discrete/focal hypermetabolic lesion of the sternum to  correspond with the indeterminant sternal lesion described on breast MRI  exam.     2/14/2022 Biopsy    Final Diagnosis   LEFT LUNG, BIOPSY:  Metastatic adenocarcinoma.  See comment.  GJK   Electronically signed by Wolfgang Hair MD on 2/22/2022 at 1154   Comment    Sections show small fragments of tumor that are morphologically distinct from the patient's known breast cancers.  Immunoprofile suggests mullerian origin.  Clinical correlation required.  This case was shown for intradepartmental consultation and the other pathologist concurs with the findings interpretation.  This case was discussed with Dr. Trevino on 02/21/22.  This case was discussed with Dr. Reed on 02/22/22.        5/4/2022 - 7/13/2022 Chemotherapy    OP GYN PACLitaxel / CARBOplatin AUC=2 (Weekly)     5/4/2022 -  Chemotherapy    OP CENTRAL VENOUS ACCESS DEVICE ACCESS, CARE, AND MAINTENANCE ([facility])     Malignant neoplasm of upper-outer quadrant of both breasts in female, estrogen receptor positive   1/21/2022 Initial Diagnosis    Malignant neoplasm of upper-outer quadrant of both breasts in female, estrogen receptor positive (HCC)     1/24/2022 Biopsy    1.  Right breast cancer-invasive  ductal carcinoma grade 2-ER negative [place] negative HER-2 negative    2.  Left breast cancer-invasive ductal carcinoma grade 2-ER positive [place] negative HER-2 negative     1/26/2022 Imaging    EXAMINATION: NM PET/CT SKULL BASE TO MID THIGH      FINDINGS:      Today's 3-D images demonstrate focal hypermetabolism in the soft tissues  of the left breast as well as focal hypermetabolism within the left  midlung.     Multiplanar images of the head and neck demonstrate symmetric FDG uptake  within the brain. There is no evidence of hypermetabolic neck mass or  lymph node.     In the upper outer quadrant of the left breast there is redemonstration  of a irregular 2.1 cm soft tissue nodule with FDG hypermetabolism of SUV  max 4.2, compatible with biopsy-proven invasive ductal carcinoma. There  is an ill-defined diffuse region of low-level hypermetabolism in the  right breast with adjacent biopsy clip and single locule of soft tissue  air, compatible with recent right breast biopsy. A discrete  hypermetabolic nodule or mass in the right breast is not confidently  identified. There is no hypermetabolic or enlarged axillary lymph nodes.     Within the chest there is redemonstration of a lobulated soft tissue  mass in the superior aspect of the left lower lobe which appears  hypermetabolic with SUV max 6.5. There is no evidence of hypermetabolic  mediastinal or hilar adenopathy.     Below the diaphragm there is expected physiologic uptake within the   and GI tracts. No evidence of abdominal pelvic malignancy or metastasis.  No hypermetabolic abdominopelvic lymph nodes. Photopenic left renal cyst  is noted.     There is no hypermetabolic pathologic marrow process. Specifically,  there is no evidence of hypermetabolic lesion of the sternum to  correspond with the indeterminant sternal lesion detailed on prior  breast MRI exam. FDG uptake at the left antecubital fossa reflects site  of IV administration.     IMPRESSION:      Hypermetabolic soft tissue nodule in the left breast compatible with  biopsy-proven invasive ductal carcinoma. There is diffuse low level FDG  uptake in the region of recent right breast biopsy site. A  hypermetabolic mass in the left lower lobe is suspicious for pulmonary  metastasis. No additional sites of metastases are identified. There is  no evidence of discrete/focal hypermetabolic lesion of the sternum to  correspond with the indeterminant sternal lesion described on breast MRI  exam.           4/20/2022 Cancer Staged    Staging form: Breast, AJCC 8th Edition  - Pathologic: Stage IB (pT1c, pN0, cM0, G3, ER-, OK-, HER2-) - Signed by Ruby Trevino MD on 4/20/2022 5/4/2022 -  Chemotherapy    OP CENTRAL VENOUS ACCESS DEVICE ACCESS, CARE, AND MAINTENANCE ([facility])     Malignant neoplasm metastatic to left lung (Resolved)   3/23/2022 Initial Diagnosis    Malignant neoplasm metastatic to left lung (HCC) (Resolved)     4/26/2022 - 4/26/2022 Radiation    Radiation OncologyTreatment Course:  Kenisha Jama received 2500 cGy in 1 fraction to left lung metastasis via Stereotactic Radiation Therapy - SRT.         REASON FOR VISIT: Management of my cancers     HISTORY OF PRESENT ILLNESS:   78 y.o.  female presents today for follow-up.  She completed carboplatin and Taxol adjuvantly for her bilateral breast cancer.  Her counts did not tolerate her treatment well.  Subsequently I sent her to [place] for radiotherapy and she completed radiotherapy.  She also underwent CyberKnife to the solitary lesion in the lung from endometrial cancer.  She is currently on letrozole alone for now for her endometrial cancer.  She presents after having scans done.  She is tolerating letrozole reasonably well.  Denies any issues with joint complaints.  No cough.  No issues with pain.  Recently she was diagnosed with liver enzymes that were elevated.  They were concerned about primary sclerosing cholangitis.  But she had seen  hepatology at  who did not feel that was the case.      Past medical history, social history and family history was reviewed 03/20/24 and unchanged from prior visit.    Review of Systems:    Review of Systems   Constitutional:  Positive for fatigue.   HENT:  Negative.     Eyes: Negative.    Respiratory: Negative.     Cardiovascular: Negative.    Gastrointestinal: Negative.    Endocrine: Negative.    Genitourinary: Negative.     Musculoskeletal: Negative.    Skin: Negative.    Neurological: Negative.    Hematological: Negative.    Psychiatric/Behavioral: Negative.              Medications:        Current Outpatient Medications:     acetaminophen (Tylenol 8 Hour) 650 MG 8 hr tablet, Take 1 tablet by mouth Every 8 (Eight) Hours As Needed for Mild Pain ., Disp: 40 tablet, Rfl: 0    atorvastatin (LIPITOR) 20 MG tablet, Take 1 tablet by mouth Every Evening., Disp: , Rfl:     carvedilol (COREG) 12.5 MG tablet, Take 1 tablet by mouth 2 (Two) Times a Day With Meals., Disp: , Rfl:     ezetimibe (ZETIA) 10 MG tablet, Take 1 tablet by mouth Every Night., Disp: , Rfl:     ferrous sulfate 325 (65 FE) MG tablet, Take 1 tablet by mouth Daily With Breakfast., Disp: , Rfl:     ibuprofen (ADVIL,MOTRIN) 600 MG tablet, Take 1 tablet by mouth Every 6 (Six) Hours As Needed for Mild Pain ., Disp: 15 tablet, Rfl: 0    ketorolac (ACULAR) 0.5 % ophthalmic solution, , Disp: , Rfl:     letrozole (FEMARA) 2.5 MG tablet, TAKE 1 TABLET BY MOUTH DAILY, Disp: 90 tablet, Rfl: 3    lidocaine-prilocaine (EMLA) 2.5-2.5 % cream, Apply 1 application topically to the appropriate area as directed As Needed for Injection Site Pain. Apply 45-60 minutes before port access, cover with plastic wrap after application., Disp: 5 g, Rfl: 5    lisinopril (PRINIVIL,ZESTRIL) 40 MG tablet, Take 1 tablet by mouth Daily., Disp: , Rfl:     mupirocin (BACTROBAN) 2 % ointment, APPLY TOPICALLY TO THE AFFECTED AREA(S) THREE TIMES DAILY FOR 7 DAYS, Disp: , Rfl:     NIFEdipine  "CC (ADALAT CC) 30 MG 24 hr tablet, Take 1 tablet by mouth Every Morning., Disp: , Rfl:     NIFEdipine XL (PROCARDIA XL) 30 MG 24 hr tablet, Take 1 tablet by mouth Daily., Disp: , Rfl:     pantoprazole (PROTONIX) 40 MG EC tablet, , Disp: , Rfl:     pravastatin (PRAVACHOL) 80 MG tablet, , Disp: , Rfl: 1    ursodiol (ACTIGALL) 500 MG tablet, , Disp: , Rfl:     [START ON 4/17/2024] apixaban (ELIQUIS) 5 MG tablet tablet, Take 1 tablet by mouth 2 (Two) Times a Day., Disp: 60 tablet, Rfl: 5    Apixaban Starter Pack (Eliquis DVT/PE Starter Pack) tablet therapy pack, Take two 5 mg tablets by mouth every 12 hours for 7 days. Followed by one 5 mg tablet every 12 hours., Disp: 74 tablet, Rfl: 0    Current Facility-Administered Medications:     lidocaine (XYLOCAINE) 1 % injection 5 mL, 5 mL, Infiltration, Once, Svetlana Juárez MD    Pain Medications               acetaminophen (Tylenol 8 Hour) 650 MG 8 hr tablet Take 1 tablet by mouth Every 8 (Eight) Hours As Needed for Mild Pain .    ibuprofen (ADVIL,MOTRIN) 600 MG tablet Take 1 tablet by mouth Every 6 (Six) Hours As Needed for Mild Pain .               ALLERGIES:  No Known Allergies      Physical Exam    VITAL SIGNS:  BP (!) 196/76 Comment: RUE  Pulse 54   Temp 97.5 °F (36.4 °C) (Temporal)   Resp 24   Ht 160 cm (63\")   Wt 69.9 kg (154 lb)   SpO2 99% Comment: RA  BMI 27.28 kg/m²     ECOG score: 0           Wt Readings from Last 3 Encounters:   03/20/24 69.9 kg (154 lb)   11/10/23 69.9 kg (154 lb)   09/06/23 69.4 kg (153 lb)       Body mass index is 27.28 kg/m². Body surface area is 1.73 meters squared.    Pain Score    03/20/24 1031   PainSc: 0-No pain           Performance Status: 0    General: well appearing, in no acute distress  HEENT: sclera anicteric, neck is supple  Lymphatics: no cervical, supraclavicular, or axillary adenopathy  Cardiovascular: regular rate and rhythm, no murmurs, rubs or gallops  Lungs: clear to auscultation bilaterally  Abdomen: soft, " nontender, nondistended.  No palpable organomegaly  Extremities: no lower extremity edema  Skin: no rashes, lesions, bruising, or petechiae  Msk:  Shows no weakness of the large muscle groups  Psych: Mood is stable        RECENT LABS:    Lab Results   Component Value Date    HGB 10.4 (L) 10/03/2023    HCT 32.1 (L) 10/03/2023    MCV 96 10/03/2023     (L) 10/03/2023    WBC 5.89 10/03/2023    NEUTROABS 3.00 08/15/2022    LYMPHSABS 1.40 08/15/2022    MONOSABS 0.40 08/15/2022    EOSABS 0.01 07/06/2022    BASOSABS 0.04 07/06/2022       Lab Results   Component Value Date    GLUCOSE 134 (H) 07/27/2022    BUN 21 07/27/2022    CREATININE 0.90 11/10/2023     (L) 07/27/2022    K 4.4 07/27/2022    CL 98 07/27/2022    CO2 23.0 07/27/2022    CALCIUM 9.2 07/27/2022    PROTEINTOT 6.6 07/27/2022    ALBUMIN 3.80 07/27/2022    BILITOT 0.7 07/27/2022    ALKPHOS 55 07/27/2022    AST 17 07/27/2022    ALT 13 07/27/2022     CT Chest With Contrast Diagnostic    Result Date: 6/7/2023  Impression: 1. New left lower lobe volume loss with an area of increasing masslike consolidation in the left infrahilar region. On the previous study, there was a discrete lung mass in the left infrahilar region which has been previously biopsied. On today's study, the masslike consolidation extends along the bronchovascular structures measuring 3.0 x 3.3 cm in diameter. This is concerning for malignancy. I would consider bronchoscopic evaluation and a repeat biopsy. 2. There are postsurgical changes in both breasts. 3. There is a combination of chronic scarring and subsegmental atelectasis in both lungs. 4. There are healing fractures of the posterior 10th through 12th ribs on the right side at the costovertebral angle. 5. No evidence of metastatic disease within the abdomen or pelvis. 6. Multiple additional findings in the chest, abdomen, and pelvis as noted above. Electronically Signed: Feliciano Morfin  6/7/2023 10:55 AM EDT  Workstation ID:  [id number]    CT Abdomen Pelvis With Contrast    Result Date: 6/7/2023  Impression: 1. New left lower lobe volume loss with an area of increasing masslike consolidation in the left infrahilar region. On the previous study, there was a discrete lung mass in the left infrahilar region which has been previously biopsied. On today's study, the masslike consolidation extends along the bronchovascular structures measuring 3.0 x 3.3 cm in diameter. This is concerning for malignancy. I would consider bronchoscopic evaluation and a repeat biopsy. 2. There are postsurgical changes in both breasts. 3. There is a combination of chronic scarring and subsegmental atelectasis in both lungs. 4. There are healing fractures of the posterior 10th through 12th ribs on the right side at the costovertebral angle. 5. No evidence of metastatic disease within the abdomen or pelvis. 6. Multiple additional findings in the chest, abdomen, and pelvis as noted above. Electronically Signed: Feliciano Morfin  6/7/2023 10:55 AM EDT  Workstation ID: [id number]    Mammo Diagnostic Digital Tomosynthesis Bilateral With CAD    Result Date: 4/21/2023  Presumed postsurgical changes bilaterally, no mammographic findings worrisome for malignancy.  ACR BI-RADS CATEGORY:  3, PROBABLY BENIGN  RECOMMENDATION:  Six-month short-term interval bilateral mammographic follow-up according to the lumpectomy protocol.   CAD was utilized.  The standard false-negative rate of mammography is between 10% and 25%. Complex patterns or increased breast density will markedly elevate the false-negative rate of mammography.    A letter, in lay terminology, with the results of this exam was given to the patient at the time of the visit.  ________________________________________________________________________ _ Physician Order  Diagnostic 6 Month follow up Mammogram with Breast Ultrasound if needed.  Diagnosis: Abnormal Mammogram  This report was finalized on 4/21/2023 1:36 PM by Maegan  MD Amie.            Final Diagnosis   1.  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  See template #1    2.  LEFT BREAST, NEEDLE LOCALIZED LUMPECTOMY:                 Invasive poorly differentiated ductal carcinoma (Stockholm score 8/9)                 Gross tumor size 28 mm                Background high-grade ductal carcinoma in situ with involvement of papillomatous lesion                 Invasive ductal carcinoma less than 1 mm from superior margin and medial margin                 Invasive ductal carcinoma 2 mm from inferior margin                 Ductal carcinoma in situ extends to posterior margin                 Biopsy cavity identified.  See template #2    3.  LEFT BREAST, EXTENDED MEDIAL, SUPERIOR, AND DEEP MARGIN:                 Focal residual carcinoma measuring 3 mm near anterior aspect                New anterior margin width is 9 mm    INVASIVE BREAST CANCER STAGING TEMPLATE #1    TYPE OF SPECIMEN/PROCEDURE: Needle localized lumpectomy  SPECIMEN LATERALITY: Right  TUMOR SITE: 11:00  TUMOR SIZE: 11 mm  TUMOR FOCALITY: Single focus  HISTOLOGIC TYPE OF INVASIVE CARCINOMA: Ductal with apocrine morphology  rdGrdRrdArdDrdErd:rd rd3rd Tubule formation: 3                Mitotic activity: 1                Pleomorphism: 3  OVERALL SCORE: 7/9  DUCTAL CARCINOMA IN SITU EXTENT: Focal  TUMOR EXTENSION: Structures not present                Skin: N/A                Skin satellite nodule: N/A                Nipple: N/a                Skeletal muscle: N/A  SURGICAL MARGINS: Invasive carcinoma present at margin                Invasive carcinoma margins: Tumor present at  margin                Distance from closest margin: 0 mm                Specific closest margin: Posterior                   DCIS margins: Uninvolved by DCIS                Distance from closest margin: 6 mm                Specific closest margin: Posterior    LYMPH NODE STATUS: None submitted                Number of lymph nodes with macrometastasis: N/A                Number of lymph nodes with micrometastasis: N/A                Number of lymph nodes with isolated tumor cells: N/A  TOTAL NUMBER OF LYMPH NODES EXAMINED: 0  NUMBER OF SENTINEL NODES EXAMINED: 0  EXTRANODAL EXTENSION OF TUMOR: N/A  SIZE OF LARGEST ENOCH METASTATIC DEPOSIT: N/A  VASCULAR/LYMPHATIC INVASION: N/A  DISTANT SITES INVOLVED: Not applicable  ESTROGEN RECEPTOR STATUS BY IHC METHOD: Negative per previous biopsy  PROGESTERONE RECEPTOR STATUS BY IHC METHOD: Negative per previous biopsy  HER-2/LAURA ONCOPROTEIN STATUS BY IHC METHOD: Equivocal (2+) per previous biopsy  HER-2/LAURA ONCOGENE STATUS BY IN SITU HYBRIDIZATION ANALYSIS: Not amplified  BIOPSY UNDER WHICH BREAST BIOMARKERS PERFORMED: SX   TREATMENT EFFECT (BREAST): No known presurgical therapy  TREATMENT EFFECT (LYMPH NODE): No known presurgical therapy  ADDITIONAL PATHOLOGIC FINDINGS: Fibrocystic change  OTHER STUDIES: Available upon request  AJCC PATHOLOGIC STAGE: (COMPLETED BY PATHOLOGIST, BASED ONLY ON TISSUE FINDINGS; MORE EXTENSIVE DISEASE MAY NOT BE KNOWN TO THE PATHOLOGIST)  pT = 1c  pN = x  pM = N/A      INVASIVE BREAST CANCER STAGING TEMPLATE #2    TYPE OF SPECIMEN/PROCEDURE: Needle localized lumpectomy  SPECIMEN LATERALITY: Left  TUMOR SITE: 2:00  TUMOR SIZE: 28 mm  TUMOR FOCALITY: Single focus  HISTOLOGIC TYPE OF INVASIVE CARCINOMA: Ductal  ndGndRndAndDndEnd:nd nd2nd Tubule formation: 3                Mitotic activity: 2                Pleomorphism: 3  OVERALL SCORE: 8/9  DUCTAL CARCINOMA IN SITU EXTENT: 12 mm  TUMOR EXTENSION: Structures not present                Skin: N/A                 Skin satellite nodule: N/A                Nipple: N/A                Skeletal muscle: N/A  SURGICAL MARGINS: Final margins uninvolved by in situ or invasive neoplasm                Invasive carcinoma margins: Extended margin uninvolved                Distance from closest margin: Extended margin 9 mm                Specific closest margin: Anterior                   DCIS margins: Uninvolved                Distance from closest margin: 12 mm                Specific closest margin: Posterior    LYMPH NODE STATUS: No lymph nodes submitted                Number of lymph nodes with macrometastasis: N/A                Number of lymph nodes with micrometastasis: N/A                Number of lymph nodes with isolated tumor cells: N/A  TOTAL NUMBER OF LYMPH NODES EXAMINED: 0  NUMBER OF SENTINEL NODES EXAMINED: 0  EXTRANODAL EXTENSION OF TUMOR: N/A  SIZE OF LARGEST ENOCH METASTATIC DEPOSIT: N/A  VASCULAR/LYMPHATIC INVASION: N/A  DISTANT SITES INVOLVED: N/A  ESTROGEN RECEPTOR STATUS BY IHC METHOD: Positive per previous biopsy  PROGESTERONE RECEPTOR STATUS BY IHC METHOD: Negative per previous biopsy  HER-2/LAURA ONCOPROTEIN STATUS BY IHC METHOD: Negative (0+) per previous biopsy   HER-2/LAURA ONCOGENE STATUS BY IN SITU HYBRIDIZATION ANALYSIS: Not performed  BIOPSY UNDER WHICH BREAST BIOMARKERS PERFORMED: [id number]  TREATMENT EFFECT (BREAST): No known presurgical therapy  TREATMENT EFFECT (LYMPH NODE): No known presurgical therapy  ADDITIONAL PATHOLOGIC FINDINGS: None significant  OTHER STUDIES: Available upon request  AJCC PATHOLOGIC STAGE: (COMPLETED BY PATHOLOGIST, BASED ONLY ON TISSUE FINDINGS; MORE EXTENSIVE DISEASE MAY NOT BE KNOWN TO THE PATHOLOGIST)  pT = 2  pN = x  pM = not applicable      CT Abdomen Pelvis With Contrast    Result Date: 3/20/2024  Impression: 1.No definitive evidence of disease progression. Left infrahilar lesion appears to be similar in size although difficult to give exact measurements due to  surrounding atelectasis and/or scarring. There is persistent left lower lobe bronchial cutoff. 2.There is left lower lobe pulmonary arterial thrombus. This may represent tumor thrombus, bland thrombus, or embolic disease. 3.Decrease in size of presumed right breast seroma. 4.Other stable chronic findings. No evidence of metastatic disease in the abdomen or pelvis. Electronically Signed: Shad Anaya MD  3/20/2024 10:41 AM EDT  Workstation ID: [id number]    CT Chest With Contrast Diagnostic    Result Date: 3/20/2024  Impression: 1.No definitive evidence of disease progression. Left infrahilar lesion appears to be similar in size although difficult to give exact measurements due to surrounding atelectasis and/or scarring. There is persistent left lower lobe bronchial cutoff. 2.There is left lower lobe pulmonary arterial thrombus. This may represent tumor thrombus, bland thrombus, or embolic disease. 3.Decrease in size of presumed right breast seroma. 4.Other stable chronic findings. No evidence of metastatic disease in the abdomen or pelvis. Electronically Signed: Shad Anaya MD  3/20/2024 10:41 AM EDT  Workstation ID: [id number]          Assessment/Plan    1.  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   completed adjuvant chemotherapy and has now completed  adjuvant radiotherapy.  Mammogram in April looked reasonable.  Currently on letrozole adjuvantly.  She is now 3 years out from her disease.    2.  Metastatic endometrial cancer.  She had a single focus of disease in her lung.  This was treated with CyberKnife.  I plan to keep her on Femara for now.  She is estrogen receptor positive on the biopsy.  I looked at the scans and actually reviewed them with Dr. Ortega today.  This appears to be consistent with radiation changes to the region after CyberKnife.  I looked at the scans which look stable.  I think it is reasonable to wait 6 months to scan her  again.    3.  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  I am going to start her on Eliquis.  Unclear why this occurred.  Will have to see if she has an underlying process that is ongoing.  She does not report any illness or periods of inactivity to suggest a cause for this.  This was incidentally found on a CT scan today.  I will put her on Eliquis and see how she does.      Ruby Trevino MD  [facility]ematology [facility] On[facility]    Return on: 05/29/24  Return in (Approximately): 2 months    No orders of the defined types were placed in this encounter.      3/20/2024

## 2024-05-21 ENCOUNTER — HOSPITAL ENCOUNTER (OUTPATIENT)
Dept: MAMMOGRAPHY | Facility: HOSPITAL | Age: 79
Discharge: HOME | End: 2024-05-21
Admitting: RADIOLOGY
Payer: MEDICARE

## 2024-05-21 DIAGNOSIS — R92.8 ABNORMAL MAMMOGRAM: ICD-10-CM

## 2024-05-21 PROCEDURE — 77066 DX MAMMO INCL CAD BI: CPT | Performed by: RADIOLOGY

## 2024-05-21 PROCEDURE — 77066 DX MAMMO INCL CAD BI: CPT

## 2024-05-21 PROCEDURE — G0279 TOMOSYNTHESIS, MAMMO: HCPCS | Performed by: RADIOLOGY

## 2024-05-21 PROCEDURE — G0279 TOMOSYNTHESIS, MAMMO: HCPCS

## 2024-05-30 ENCOUNTER — OFFICE VISIT (OUTPATIENT)
Dept: ONCOLOGY | Facility: CLINIC | Age: 79
End: 2024-05-30
Payer: MEDICARE

## 2024-05-30 VITALS
SYSTOLIC BLOOD PRESSURE: 140 MMHG | BODY MASS INDEX: 28.17 KG/M2 | OXYGEN SATURATION: 98 % | DIASTOLIC BLOOD PRESSURE: 70 MMHG | HEART RATE: 59 BPM | HEIGHT: 63 IN | WEIGHT: 159 LBS | TEMPERATURE: 97.5 F | RESPIRATION RATE: 20 BRPM

## 2024-05-30 DIAGNOSIS — I26.99 OTHER ACUTE PULMONARY EMBOLISM WITHOUT ACUTE COR PULMONALE: Primary | ICD-10-CM

## 2024-05-30 NOTE — PROGRESS NOTES
PROBLEM LIST:  Oncology/Hematology History   Endometrial cancer   9/30/2011 Imaging    TVUS and CT scan for palpable left pelvic mass upon annual exam and new abdominal symptoms.      10/6/2011 Surgery    ADY/BSO with pelvic lymph node dissection. Stage 1A grade 2 endometrial adenocarcinoma by final pathology     1/26/2022 Imaging    IMPRESSION:     Hypermetabolic soft tissue nodule in the left breast compatible with  biopsy-proven invasive ductal carcinoma. There is diffuse low level FDG  uptake in the region of recent right breast biopsy site. A  hypermetabolic mass in the left lower lobe is suspicious for pulmonary  metastasis. No additional sites of metastases are identified. There is  no evidence of discrete/focal hypermetabolic lesion of the sternum to  correspond with the indeterminant sternal lesion described on breast MRI  exam.     2/14/2022 Biopsy    Final Diagnosis   LEFT LUNG, BIOPSY:  Metastatic adenocarcinoma.  See comment.  GJK   Electronically signed by Wolfgang Hair MD on 2/22/2022 at 1154   Comment    Sections show small fragments of tumor that are morphologically distinct from the patient's known breast cancers.  Immunoprofile suggests mullerian origin.  Clinical correlation required.  This case was shown for intradepartmental consultation and the other pathologist concurs with the findings interpretation.  This case was discussed with Dr. Trevino on 02/21/22.  This case was discussed with Dr. Reed on 02/22/22.        5/4/2022 - 7/13/2022 Chemotherapy    OP GYN PACLitaxel / CARBOplatin AUC=2 (Weekly)     5/4/2022 -  Chemotherapy    OP CENTRAL VENOUS ACCESS DEVICE ACCESS, CARE, AND MAINTENANCE ([facility])     Malignant neoplasm of upper-outer quadrant of both breasts in female, estrogen receptor positive   1/21/2022 Initial Diagnosis    Malignant neoplasm of upper-outer quadrant of both breasts in female, estrogen receptor positive (HCC)     1/24/2022 Biopsy    1.  Right breast cancer-invasive  ductal carcinoma grade 2-ER negative [facility] negative HER-2 negative    2.  Left breast cancer-invasive ductal carcinoma grade 2-ER positive [facility] negative HER-2 negative     1/26/2022 Imaging    EXAMINATION: NM PET/CT SKULL BASE TO MID THIGH      FINDINGS:      Today's 3-D images demonstrate focal hypermetabolism in the soft tissues  of the left breast as well as focal hypermetabolism within the left  midlung.     Multiplanar images of the head and neck demonstrate symmetric FDG uptake  within the brain. There is no evidence of hypermetabolic neck mass or  lymph node.     In the upper outer quadrant of the left breast there is redemonstration  of a irregular 2.1 cm soft tissue nodule with FDG hypermetabolism of SUV  max 4.2, compatible with biopsy-proven invasive ductal carcinoma. There  is an ill-defined diffuse region of low-level hypermetabolism in the  right breast with adjacent biopsy clip and single locule of soft tissue  air, compatible with recent right breast biopsy. A discrete  hypermetabolic nodule or mass in the right breast is not confidently  identified. There is no hypermetabolic or enlarged axillary lymph nodes.     Within the chest there is redemonstration of a lobulated soft tissue  mass in the superior aspect of the left lower lobe which appears  hypermetabolic with SUV max 6.5. There is no evidence of hypermetabolic  mediastinal or hilar adenopathy.     Below the diaphragm there is expected physiologic uptake within the   and GI tracts. No evidence of abdominal pelvic malignancy or metastasis.  No hypermetabolic abdominopelvic lymph nodes. Photopenic left renal cyst  is noted.     There is no hypermetabolic pathologic marrow process. Specifically,  there is no evidence of hypermetabolic lesion of the sternum to  correspond with the indeterminant sternal lesion detailed on prior  breast MRI exam. FDG uptake at the left antecubital fossa reflects site  of IV administration.     IMPRESSION:      Hypermetabolic soft tissue nodule in the left breast compatible with  biopsy-proven invasive ductal carcinoma. There is diffuse low level FDG  uptake in the region of recent right breast biopsy site. A  hypermetabolic mass in the left lower lobe is suspicious for pulmonary  metastasis. No additional sites of metastases are identified. There is  no evidence of discrete/focal hypermetabolic lesion of the sternum to  correspond with the indeterminant sternal lesion described on breast MRI  exam.           4/20/2022 Cancer Staged    Staging form: Breast, AJCC 8th Edition  - Pathologic: Stage IB (pT1c, pN0, cM0, G3, ER-, MD-, HER2-) - Signed by Ruby Trevino MD on 4/20/2022 5/4/2022 -  Chemotherapy    OP CENTRAL VENOUS ACCESS DEVICE ACCESS, CARE, AND MAINTENANCE ([facility])     Malignant neoplasm metastatic to left lung (Resolved)   3/23/2022 Initial Diagnosis    Malignant neoplasm metastatic to left lung (HCC) (Resolved)     4/26/2022 - 4/26/2022 Radiation    Radiation OncologyTreatment Course:  Kenisha Jama received 2500 cGy in 1 fraction to left lung metastasis via Stereotactic Radiation Therapy - SRT.         REASON FOR VISIT: Management of my cancers     HISTORY OF PRESENT ILLNESS:   78 y.o.  female presents today for follow-up.  She completed carboplatin and Taxol adjuvantly for her bilateral breast cancer.  Her counts did not tolerate her treatment well.  Subsequently I sent her to [facility] for radiotherapy and she completed radiotherapy.  She also underwent CyberKnife to the solitary lesion in the lung from endometrial cancer.  She is currently on letrozole alone for now for her endometrial cancer.  She is doing reasonably well so far.  She is followed for GI due to a vague history of PBC.  So she has been on ursodiol for a number of years without a clear diagnosis.  She also was recently diagnosed with a pulmonary embolism and I started her on Eliquis.  She is doing reasonably well on Eliquis  without any significant bruising.    Past medical history, social history and family history was reviewed 05/30/24 and unchanged from prior visit.    Review of Systems:    Review of Systems   Constitutional:  Positive for fatigue.   HENT:  Negative.     Eyes: Negative.    Respiratory: Negative.     Cardiovascular: Negative.    Gastrointestinal: Negative.    Endocrine: Negative.    Genitourinary: Negative.     Musculoskeletal: Negative.    Skin: Negative.    Neurological: Negative.    Hematological: Negative.    Psychiatric/Behavioral: Negative.              Medications:        Current Outpatient Medications:     acetaminophen (Tylenol 8 Hour) 650 MG 8 hr tablet, Take 1 tablet by mouth Every 8 (Eight) Hours As Needed for Mild Pain ., Disp: 40 tablet, Rfl: 0    apixaban (ELIQUIS) 5 MG tablet tablet, Take 1 tablet by mouth 2 (Two) Times a Day., Disp: 60 tablet, Rfl: 5    Apixaban Starter Pack (Eliquis DVT/PE Starter Pack) tablet therapy pack, Take two 5 mg tablets by mouth every 12 hours for 7 days. Followed by one 5 mg tablet every 12 hours., Disp: 74 tablet, Rfl: 0    atorvastatin (LIPITOR) 20 MG tablet, Take 1 tablet by mouth Every Evening., Disp: , Rfl:     carvedilol (COREG) 12.5 MG tablet, Take 1 tablet by mouth 2 (Two) Times a Day With Meals., Disp: , Rfl:     ezetimibe (ZETIA) 10 MG tablet, Take 1 tablet by mouth Every Night., Disp: , Rfl:     ferrous sulfate 325 (65 FE) MG tablet, Take 1 tablet by mouth Daily With Breakfast., Disp: , Rfl:     ibuprofen (ADVIL,MOTRIN) 600 MG tablet, Take 1 tablet by mouth Every 6 (Six) Hours As Needed for Mild Pain ., Disp: 15 tablet, Rfl: 0    ketorolac (ACULAR) 0.5 % ophthalmic solution, , Disp: , Rfl:     letrozole (FEMARA) 2.5 MG tablet, TAKE 1 TABLET BY MOUTH DAILY, Disp: 90 tablet, Rfl: 3    lidocaine-prilocaine (EMLA) 2.5-2.5 % cream, Apply 1 application topically to the appropriate area as directed As Needed for Injection Site Pain. Apply 45-60 minutes before port  "access, cover with plastic wrap after application., Disp: 5 g, Rfl: 5    lisinopril (PRINIVIL,ZESTRIL) 40 MG tablet, Take 1 tablet by mouth Daily., Disp: , Rfl:     mupirocin (BACTROBAN) 2 % ointment, APPLY TOPICALLY TO THE AFFECTED AREA(S) THREE TIMES DAILY FOR 7 DAYS, Disp: , Rfl:     NIFEdipine CC (ADALAT CC) 30 MG 24 hr tablet, Take 1 tablet by mouth Every Morning., Disp: , Rfl:     NIFEdipine XL (PROCARDIA XL) 30 MG 24 hr tablet, Take 1 tablet by mouth Daily., Disp: , Rfl:     pantoprazole (PROTONIX) 40 MG EC tablet, , Disp: , Rfl:     pravastatin (PRAVACHOL) 80 MG tablet, , Disp: , Rfl: 1    ursodiol (ACTIGALL) 500 MG tablet, , Disp: , Rfl:     Current Facility-Administered Medications:     lidocaine (XYLOCAINE) 1 % injection 5 mL, 5 mL, Infiltration, Once, Svetlana Juárez MD    Pain Medications               acetaminophen (Tylenol 8 Hour) 650 MG 8 hr tablet Take 1 tablet by mouth Every 8 (Eight) Hours As Needed for Mild Pain .    ibuprofen (ADVIL,MOTRIN) 600 MG tablet Take 1 tablet by mouth Every 6 (Six) Hours As Needed for Mild Pain .               ALLERGIES:  No Known Allergies      Physical Exam    VITAL SIGNS:  /70 Comment: LUE- Manual  Pulse 59   Temp 97.5 °F (36.4 °C) (Infrared)   Resp 20   Ht 160 cm (63\")   Wt 72.1 kg (159 lb)   SpO2 98%   BMI 28.17 kg/m²     ECOG score: 0           Wt Readings from Last 3 Encounters:   05/30/24 72.1 kg (159 lb)   03/20/24 69.9 kg (154 lb)   11/10/23 69.9 kg (154 lb)       Body mass index is 28.17 kg/m². Body surface area is 1.75 meters squared.    Pain Score    05/30/24 1441   PainSc: 0-No pain           Performance Status: 0    General: well appearing, in no acute distress  HEENT: sclera anicteric, neck is supple  Lymphatics: no cervical, supraclavicular, or axillary adenopathy  Cardiovascular: regular rate and rhythm, no murmurs, rubs or gallops  Lungs: clear to auscultation bilaterally  Abdomen: soft, nontender, nondistended.  No palpable " organomegaly  Extremities: no lower extremity edema  Skin: no rashes, lesions, bruising, or petechiae  Msk:  Shows no weakness of the large muscle groups  Psych: Mood is stable        RECENT LABS:    Lab Results   Component Value Date    HGB 11.5 05/30/2024    HCT 34.6 05/30/2024    MCV 94 05/30/2024     (L) 05/30/2024    WBC 6.28 05/30/2024    NEUTROABS 4.37 05/30/2024    LYMPHSABS 1.13 (L) 05/30/2024    MONOSABS 0.64 05/30/2024    EOSABS 0.11 05/30/2024    BASOSABS 0.02 05/30/2024       Lab Results   Component Value Date    GLUCOSE 134 (H) 07/27/2022    BUN 21 07/27/2022    CREATININE 0.90 03/20/2024     (L) 07/27/2022    K 4.4 07/27/2022    CL 98 07/27/2022    CO2 23.0 07/27/2022    CALCIUM 9.2 07/27/2022    PROTEINTOT 6.6 07/27/2022    ALBUMIN 3.80 07/27/2022    BILITOT 0.7 07/27/2022    ALKPHOS 55 07/27/2022    AST 17 07/27/2022    ALT 13 07/27/2022     CT Chest With Contrast Diagnostic    Result Date: 6/7/2023  Impression: 1. New left lower lobe volume loss with an area of increasing masslike consolidation in the left infrahilar region. On the previous study, there was a discrete lung mass in the left infrahilar region which has been previously biopsied. On today's study, the masslike consolidation extends along the bronchovascular structures measuring 3.0 x 3.3 cm in diameter. This is concerning for malignancy. I would consider bronchoscopic evaluation and a repeat biopsy. 2. There are postsurgical changes in both breasts. 3. There is a combination of chronic scarring and subsegmental atelectasis in both lungs. 4. There are healing fractures of the posterior 10th through 12th ribs on the right side at the costovertebral angle. 5. No evidence of metastatic disease within the abdomen or pelvis. 6. Multiple additional findings in the chest, abdomen, and pelvis as noted above. Electronically Signed: Feliciano Spears/7/2023 10:55 AM EDT  Workstation ID: [id number]    CT Abdomen Pelvis With  Contrast    Result Date: 6/7/2023  Impression: 1. New left lower lobe volume loss with an area of increasing masslike consolidation in the left infrahilar region. On the previous study, there was a discrete lung mass in the left infrahilar region which has been previously biopsied. On today's study, the masslike consolidation extends along the bronchovascular structures measuring 3.0 x 3.3 cm in diameter. This is concerning for malignancy. I would consider bronchoscopic evaluation and a repeat biopsy. 2. There are postsurgical changes in both breasts. 3. There is a combination of chronic scarring and subsegmental atelectasis in both lungs. 4. There are healing fractures of the posterior 10th through 12th ribs on the right side at the costovertebral angle. 5. No evidence of metastatic disease within the abdomen or pelvis. 6. Multiple additional findings in the chest, abdomen, and pelvis as noted above. Electronically Signed: Feliciano Morfin  6/7/2023 10:55 AM EDT  Workstation ID: [id number]    Mammo Diagnostic Digital Tomosynthesis Bilateral With CAD    Result Date: 4/21/2023  Presumed postsurgical changes bilaterally, no mammographic findings worrisome for malignancy.  ACR BI-RADS CATEGORY:  3, PROBABLY BENIGN  RECOMMENDATION:  Six-month short-term interval bilateral mammographic follow-up according to the lumpectomy protocol.   CAD was utilized.  The standard false-negative rate of mammography is between 10% and 25%. Complex patterns or increased breast density will markedly elevate the false-negative rate of mammography.    A letter, in lay terminology, with the results of this exam was given to the patient at the time of the visit.  ________________________________________________________________________ _ Physician Order  Diagnostic 6 Month follow up Mammogram with Breast Ultrasound if needed.  Diagnosis: Abnormal Mammogram  This report was finalized on 4/21/2023 1:36 PM by Maegan Holloway MD.            Final Diagnosis    1.  RIGHT BREAST, NEEDLE LOCALIZED LUMPECTOMY:                 Invasive moderately differentiated ductal carcinoma with apocrine morphology (Upperco score 7/9)                 Gross tumor size equal 11 mm                 Biopsy site identified                 Focal high-grade ductal carcinoma in situ identified                 Invasive carcinoma present at the deep/posterior surgical margin.  See template #1    2.  LEFT BREAST, NEEDLE LOCALIZED LUMPECTOMY:                 Invasive poorly differentiated ductal carcinoma (Upperco score 8/9)                 Gross tumor size 28 mm                Background high-grade ductal carcinoma in situ with involvement of papillomatous lesion                 Invasive ductal carcinoma less than 1 mm from superior margin and medial margin                 Invasive ductal carcinoma 2 mm from inferior margin                 Ductal carcinoma in situ extends to posterior margin                 Biopsy cavity identified.  See template #2    3.  LEFT BREAST, EXTENDED MEDIAL, SUPERIOR, AND DEEP MARGIN:                 Focal residual carcinoma measuring 3 mm near anterior aspect                New anterior margin width is 9 mm    INVASIVE BREAST CANCER STAGING TEMPLATE #1    TYPE OF SPECIMEN/PROCEDURE: Needle localized lumpectomy  SPECIMEN LATERALITY: Right  TUMOR SITE: 11:00  TUMOR SIZE: 11 mm  TUMOR FOCALITY: Single focus  HISTOLOGIC TYPE OF INVASIVE CARCINOMA: Ductal with apocrine morphology  rdGrdRrdArdDrdErd:rd rd3rd Tubule formation: 3                Mitotic activity: 1                Pleomorphism: 3  OVERALL SCORE: 7/9  DUCTAL CARCINOMA IN SITU EXTENT: Focal  TUMOR EXTENSION: Structures not present                Skin: N/A                Skin satellite nodule: N/A                Nipple: N/a                Skeletal muscle: N/A  SURGICAL MARGINS: Invasive carcinoma present at margin                Invasive carcinoma margins: Tumor present at margin                Distance from  closest margin: 0 mm                Specific closest margin: Posterior                   DCIS margins: Uninvolved by DCIS                Distance from closest margin: 6 mm                Specific closest margin: Posterior    LYMPH NODE STATUS: None submitted                Number of lymph nodes with macrometastasis: N/A                Number of lymph nodes with micrometastasis: N/A                Number of lymph nodes with isolated tumor cells: N/A  TOTAL NUMBER OF LYMPH NODES EXAMINED: 0  NUMBER OF SENTINEL NODES EXAMINED: 0  EXTRANODAL EXTENSION OF TUMOR: N/A  SIZE OF LARGEST ENOCH METASTATIC DEPOSIT: N/A  VASCULAR/LYMPHATIC INVASION: N/A  DISTANT SITES INVOLVED: Not applicable  ESTROGEN RECEPTOR STATUS BY IHC METHOD: Negative per previous biopsy  PROGESTERONE RECEPTOR STATUS BY IHC METHOD: Negative per previous biopsy  HER-2/LAURA ONCOPROTEIN STATUS BY IHC METHOD: Equivocal (2+) per previous biopsy  HER-2/LAURA ONCOGENE STATUS BY IN SITU HYBRIDIZATION ANALYSIS: Not amplified  BIOPSY UNDER WHICH BREAST BIOMARKERS PERFORMED: SX   TREATMENT EFFECT (BREAST): No known presurgical therapy  TREATMENT EFFECT (LYMPH NODE): No known presurgical therapy  ADDITIONAL PATHOLOGIC FINDINGS: Fibrocystic change  OTHER STUDIES: Available upon request  AJCC PATHOLOGIC STAGE: (COMPLETED BY PATHOLOGIST, BASED ONLY ON TISSUE FINDINGS; MORE EXTENSIVE DISEASE MAY NOT BE KNOWN TO THE PATHOLOGIST)  pT = 1c  pN = x  pM = N/A      INVASIVE BREAST CANCER STAGING TEMPLATE #2    TYPE OF SPECIMEN/PROCEDURE: Needle localized lumpectomy  SPECIMEN LATERALITY: Left  TUMOR SITE: 2:00  TUMOR SIZE: 28 mm  TUMOR FOCALITY: Single focus  HISTOLOGIC TYPE OF INVASIVE CARCINOMA: Ductal  thGthRthAthDthEth:th th4th Tubule formation: 3                Mitotic activity: 2                Pleomorphism: 3  OVERALL SCORE: 8/9  DUCTAL CARCINOMA IN SITU EXTENT: 12 mm  TUMOR EXTENSION: Structures not present                Skin: N/A                Skin satellite nodule:  N/A                Nipple: N/A                Skeletal muscle: N/A  SURGICAL MARGINS: Final margins uninvolved by in situ or invasive neoplasm                Invasive carcinoma margins: Extended margin uninvolved                Distance from closest margin: Extended margin 9 mm                Specific closest margin: Anterior                   DCIS margins: Uninvolved                Distance from closest margin: 12 mm                Specific closest margin: Posterior    LYMPH NODE STATUS: No lymph nodes submitted                Number of lymph nodes with macrometastasis: N/A                Number of lymph nodes with micrometastasis: N/A                Number of lymph nodes with isolated tumor cells: N/A  TOTAL NUMBER OF LYMPH NODES EXAMINED: 0  NUMBER OF SENTINEL NODES EXAMINED: 0  EXTRANODAL EXTENSION OF TUMOR: N/A  SIZE OF LARGEST ENOCH METASTATIC DEPOSIT: N/A  VASCULAR/LYMPHATIC INVASION: N/A  DISTANT SITES INVOLVED: N/A  ESTROGEN RECEPTOR STATUS BY IHC METHOD: Positive per previous biopsy  PROGESTERONE RECEPTOR STATUS BY IHC METHOD: Negative per previous biopsy  HER-2/LAURA ONCOPROTEIN STATUS BY IHC METHOD: Negative (0+) per previous biopsy   HER-2/LAURA ONCOGENE STATUS BY IN SITU HYBRIDIZATION ANALYSIS: Not performed  BIOPSY UNDER WHICH BREAST BIOMARKERS PERFORMED: [id number]  TREATMENT EFFECT (BREAST): No known presurgical therapy  TREATMENT EFFECT (LYMPH NODE): No known presurgical therapy  ADDITIONAL PATHOLOGIC FINDINGS: None significant  OTHER STUDIES: Available upon request  AJCC PATHOLOGIC STAGE: (COMPLETED BY PATHOLOGIST, BASED ONLY ON TISSUE FINDINGS; MORE EXTENSIVE DISEASE MAY NOT BE KNOWN TO THE PATHOLOGIST)  pT = 2  pN = x  pM = not applicable      CT Abdomen Pelvis With Contrast    Result Date: 3/20/2024  Impression: 1.No definitive evidence of disease progression. Left infrahilar lesion appears to be similar in size although difficult to give exact measurements due to surrounding atelectasis and/or  scarring. There is persistent left lower lobe bronchial cutoff. 2.There is left lower lobe pulmonary arterial thrombus. This may represent tumor thrombus, bland thrombus, or embolic disease. 3.Decrease in size of presumed right breast seroma. 4.Other stable chronic findings. No evidence of metastatic disease in the abdomen or pelvis. Electronically Signed: Shad Anaya MD  3/20/2024 10:41 AM EDT  Workstation ID: [id number]    CT Chest With Contrast Diagnostic    Result Date: 3/20/2024  Impression: 1.No definitive evidence of disease progression. Left infrahilar lesion appears to be similar in size although difficult to give exact measurements due to surrounding atelectasis and/or scarring. There is persistent left lower lobe bronchial cutoff. 2.There is left lower lobe pulmonary arterial thrombus. This may represent tumor thrombus, bland thrombus, or embolic disease. 3.Decrease in size of presumed right breast seroma. 4.Other stable chronic findings. No evidence of metastatic disease in the abdomen or pelvis. Electronically Signed: Shad Anaya MD  3/20/2024 10:41 AM EDT  Workstation ID: [id number]          Assessment/Plan    1.  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   completed adjuvant chemotherapy and has now completed  adjuvant radiotherapy.  Mammogram in April looked reasonable.  Currently on letrozole adjuvantly.  She is now 3 years out from her disease.    2.  Metastatic endometrial cancer.  She had a single focus of disease in her lung.  This was treated with CyberKnife.  I plan to keep her on Femara for now.  She is estrogen receptor positive on the biopsy.  I looked at the scans and actually reviewed them with Dr. Ortega today.  This appears to be consistent with radiation changes to the region after CyberKnife.  I looked at the scans which look stable.  I think it is reasonable to wait 6 months to scan her again.    3.  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  Continue Eliquis.  Plan to treat her for 6 months.  Will likely recheck her scans in 3 months to make sure they look okay prior to considering a lower dose of Eliquis.  However the co-pay is fairly expensive so I have to consider cheaper alternatives if necessary.    Ruby Trevino MD  [facility] Hematology and Oncology    Return in (Approximately): 3 months    Orders Placed This Encounter   Procedures    CT Chest With Contrast Diagnostic    CT Abdomen Pelvis With Contrast       5/30/2024

## 2024-08-26 ENCOUNTER — HOSPITAL ENCOUNTER (OUTPATIENT)
Dept: HOSPITAL 22 - RAD | Age: 79
Discharge: HOME | End: 2024-08-26
Payer: MEDICARE

## 2024-08-26 DIAGNOSIS — M81.0: Primary | ICD-10-CM

## 2024-08-26 PROCEDURE — 77080 DXA BONE DENSITY AXIAL: CPT

## 2024-08-26 NOTE — XR_ITS
FINAL REPORT 
 
TECHNIQUE: 
Bone densitometry calculations of the lumbar spine and left hip 
were obtained. 
 
CLINICAL HISTORY: 
SCREENING 
 
COMPARISON: 
None 
 
FINDINGS: 
Using L1-4, the bone mineral density of the spine is 0.799 
g/cm2, corresponding to T-score of -2.3.  Using the left hip, 
the bone mineral density of the femoral neck is 0.678 g/cm2, 
corresponding to a T-score of -2.2.    NOTE:   T-score: 
Standard deviation compared with peak bone mass of young adult 
mean.  *Following the recommendations of the International 
Society of Bone densitometry, classification of hip BMD is based 
on the lower of two T-scores; total hip or femoral neck. 
 
IMPRESSION: 
Diminished bone mineral density of the lumbar spine and left hip 
consistent with osteopenia. 
 
Reviewed, Interpreted and Dictated by Phoenix Whiting MD 
Transcribed by Kaylin Tatum 
 
Authenticated and Electronically Signed by Phoenix Whiting MD 
on 08/26/2024 09:53:12 AM [facility]

## 2024-09-11 ENCOUNTER — OFFICE VISIT (OUTPATIENT)
Dept: ONCOLOGY | Facility: CLINIC | Age: 79
End: 2024-09-11
Payer: MEDICARE

## 2024-09-11 ENCOUNTER — HOSPITAL ENCOUNTER (OUTPATIENT)
Dept: CT IMAGING | Facility: HOSPITAL | Age: 79
Discharge: HOME | End: 2024-09-11
Admitting: INTERNAL MEDICINE
Payer: MEDICARE

## 2024-09-11 VITALS
BODY MASS INDEX: 28.35 KG/M2 | TEMPERATURE: 98 F | HEIGHT: 63 IN | OXYGEN SATURATION: 98 % | RESPIRATION RATE: 24 BRPM | HEART RATE: 54 BPM | WEIGHT: 160 LBS | SYSTOLIC BLOOD PRESSURE: 175 MMHG | DIASTOLIC BLOOD PRESSURE: 86 MMHG

## 2024-09-11 DIAGNOSIS — C50.412 MALIGNANT NEOPLASM OF UPPER-OUTER QUADRANT OF BOTH BREASTS IN FEMALE, ESTROGEN RECEPTOR POSITIVE: Primary | ICD-10-CM

## 2024-09-11 DIAGNOSIS — Z17.0 MALIGNANT NEOPLASM OF UPPER-OUTER QUADRANT OF BOTH BREASTS IN FEMALE, ESTROGEN RECEPTOR POSITIVE: Primary | ICD-10-CM

## 2024-09-11 DIAGNOSIS — C50.411 MALIGNANT NEOPLASM OF UPPER-OUTER QUADRANT OF BOTH BREASTS IN FEMALE, ESTROGEN RECEPTOR POSITIVE: Primary | ICD-10-CM

## 2024-09-11 DIAGNOSIS — I26.99 OTHER ACUTE PULMONARY EMBOLISM WITHOUT ACUTE COR PULMONALE: ICD-10-CM

## 2024-09-11 PROCEDURE — 25510000001 IOPAMIDOL 61 % SOLUTION: Performed by: INTERNAL MEDICINE

## 2024-09-11 PROCEDURE — 74177 CT ABD & PELVIS W/CONTRAST: CPT

## 2024-09-11 PROCEDURE — 71260 CT THORAX DX C+: CPT

## 2024-09-11 PROCEDURE — 1126F AMNT PAIN NOTED NONE PRSNT: CPT | Performed by: INTERNAL MEDICINE

## 2024-09-11 PROCEDURE — 99214 OFFICE O/P EST MOD 30 MIN: CPT | Performed by: INTERNAL MEDICINE

## 2024-09-11 RX ORDER — IOPAMIDOL 612 MG/ML
100 INJECTION, SOLUTION INTRAVASCULAR
Status: COMPLETED | OUTPATIENT
Start: 2024-09-11 | End: 2024-09-11

## 2024-09-11 RX ADMIN — IOPAMIDOL 85 ML: 612 INJECTION, SOLUTION INTRAVENOUS at 13:36

## 2024-09-11 NOTE — PROGRESS NOTES
PROBLEM LIST:  Oncology/Hematology History   Endometrial cancer   9/30/2011 Imaging    TVUS and CT scan for palpable left pelvic mass upon annual exam and new abdominal symptoms.      10/6/2011 Surgery    ADY/BSO with pelvic lymph node dissection. Stage 1A grade 2 endometrial adenocarcinoma by final pathology     1/26/2022 Imaging    IMPRESSION:     Hypermetabolic soft tissue nodule in the left breast compatible with  biopsy-proven invasive ductal carcinoma. There is diffuse low level FDG  uptake in the region of recent right breast biopsy site. A  hypermetabolic mass in the left lower lobe is suspicious for pulmonary  metastasis. No additional sites of metastases are identified. There is  no evidence of discrete/focal hypermetabolic lesion of the sternum to  correspond with the indeterminant sternal lesion described on breast MRI  exam.     2/14/2022 Biopsy    Final Diagnosis   LEFT LUNG, BIOPSY:  Metastatic adenocarcinoma.  See comment.  GJK   Electronically signed by Wolfgang Hair MD on 2/22/2022 at 1154   Comment    Sections show small fragments of tumor that are morphologically distinct from the patient's known breast cancers.  Immunoprofile suggests mullerian origin.  Clinical correlation required.  This case was shown for intradepartmental consultation and the other pathologist concurs with the findings interpretation.  This case was discussed with Dr. Trevino on 02/21/22.  This case was discussed with Dr. Reed on 02/22/22.        5/4/2022 - 7/13/2022 Chemotherapy    OP GYN PACLitaxel / CARBOplatin AUC=2 (Weekly)     5/4/2022 -  Chemotherapy    OP CENTRAL VENOUS ACCESS DEVICE ACCESS, CARE, AND MAINTENANCE ([facility])     Malignant neoplasm of upper-outer quadrant of both breasts in female, estrogen receptor positive   1/21/2022 Initial Diagnosis    Malignant neoplasm of upper-outer quadrant of both breasts in female, estrogen receptor positive (HCC)     1/24/2022 Biopsy    1.  Right breast cancer-invasive  ductal carcinoma grade 2-ER negative AR negative HER-2 negative    2.  Left breast cancer-invasive ductal carcinoma grade 2-ER positive AR negative HER-2 negative     1/26/2022 Imaging    EXAMINATION: NM PET/CT SKULL BASE TO MID THIGH      FINDINGS:      Today's 3-D images demonstrate focal hypermetabolism in the soft tissues  of the left breast as well as focal hypermetabolism within the left  midlung.     Multiplanar images of the head and neck demonstrate symmetric FDG uptake  within the brain. There is no evidence of hypermetabolic neck mass or  lymph node.     In the upper outer quadrant of the left breast there is redemonstration  of a irregular 2.1 cm soft tissue nodule with FDG hypermetabolism of SUV  max 4.2, compatible with biopsy-proven invasive ductal carcinoma. There  is an ill-defined diffuse region of low-level hypermetabolism in the  right breast with adjacent biopsy clip and single locule of soft tissue  air, compatible with recent right breast biopsy. A discrete  hypermetabolic nodule or mass in the right breast is not confidently  identified. There is no hypermetabolic or enlarged axillary lymph nodes.     Within the chest there is redemonstration of a lobulated soft tissue  mass in the superior aspect of the left lower lobe which appears  hypermetabolic with SUV max 6.5. There is no evidence of hypermetabolic  mediastinal or hilar adenopathy.     Below the diaphragm there is expected physiologic uptake within the   and GI tracts. No evidence of abdominal pelvic malignancy or metastasis.  No hypermetabolic abdominopelvic lymph nodes. Photopenic left renal cyst  is noted.     There is no hypermetabolic pathologic marrow process. Specifically,  there is no evidence of hypermetabolic lesion of the sternum to  correspond with the indeterminant sternal lesion detailed on prior  breast MRI exam. FDG uptake at the left antecubital fossa reflects site  of IV administration.     IMPRESSION:      Hypermetabolic soft tissue nodule in the left breast compatible with  biopsy-proven invasive ductal carcinoma. There is diffuse low level FDG  uptake in the region of recent right breast biopsy site. A  hypermetabolic mass in the left lower lobe is suspicious for pulmonary  metastasis. No additional sites of metastases are identified. There is  no evidence of discrete/focal hypermetabolic lesion of the sternum to  correspond with the indeterminant sternal lesion described on breast MRI  exam.           4/20/2022 Cancer Staged    Staging form: Breast, AJCC 8th Edition  - Pathologic: Stage IB (pT1c, pN0, cM0, G3, ER-, IL-, HER2-) - Signed by Ruby Trevino MD on 4/20/2022 5/4/2022 -  Chemotherapy    OP CENTRAL VENOUS ACCESS DEVICE ACCESS, CARE, AND MAINTENANCE ([facility])     Malignant neoplasm metastatic to left lung (Resolved)   3/23/2022 Initial Diagnosis    Malignant neoplasm metastatic to left lung (HCC) (Resolved)     4/26/2022 - 4/26/2022 Radiation    Radiation OncologyTreatment Course:  Kenisha Jaam received 2500 cGy in 1 fraction to left lung metastasis via Stereotactic Radiation Therapy - SRT.         REASON FOR VISIT: Management of my cancers     HISTORY OF PRESENT ILLNESS:   78 y.o.  female presents today for follow-up.  She completed carboplatin and Taxol adjuvantly for her bilateral breast cancer.  Her counts did not tolerate her treatment well.  Subsequently I sent her to [place] for radiotherapy and she completed radiotherapy.  She also underwent CyberKnife to the solitary lesion in the lung from endometrial cancer.  She is currently on letrozole alone for now for her endometrial cancer.  She is doing reasonably well so far.  She is followed for GI due to a vague history of PBC.  So she has been on ursodiol for a number of years without a clear diagnosis.  She also was recently diagnosed with a pulmonary embolism and I started her on Eliquis.  She has completed 6 months of that.  She is  doing well otherwise.    Past medical history, social history and family history was reviewed 09/12/24 and unchanged from prior visit.    Review of Systems:    Review of Systems   Constitutional:  Positive for fatigue.   HENT:  Negative.     Eyes: Negative.    Respiratory: Negative.     Cardiovascular: Negative.    Gastrointestinal: Negative.    Endocrine: Negative.    Genitourinary: Negative.     Musculoskeletal: Negative.    Skin: Negative.    Neurological: Negative.    Hematological: Negative.    Psychiatric/Behavioral: Negative.              Medications:        Current Outpatient Medications:     acetaminophen (Tylenol 8 Hour) 650 MG 8 hr tablet, Take 1 tablet by mouth Every 8 (Eight) Hours As Needed for Mild Pain ., Disp: 40 tablet, Rfl: 0    apixaban (ELIQUIS) 5 MG tablet tablet, Take 1 tablet by mouth 2 (Two) Times a Day., Disp: 60 tablet, Rfl: 5    Apixaban Starter Pack (Eliquis DVT/PE Starter Pack) tablet therapy pack, Take two 5 mg tablets by mouth every 12 hours for 7 days. Followed by one 5 mg tablet every 12 hours., Disp: 74 tablet, Rfl: 0    atorvastatin (LIPITOR) 20 MG tablet, Take 1 tablet by mouth Every Evening., Disp: , Rfl:     carvedilol (COREG) 12.5 MG tablet, Take 1 tablet by mouth 2 (Two) Times a Day With Meals., Disp: , Rfl:     ezetimibe (ZETIA) 10 MG tablet, Take 1 tablet by mouth Every Night., Disp: , Rfl:     ferrous sulfate 325 (65 FE) MG tablet, Take 1 tablet by mouth Daily With Breakfast., Disp: , Rfl:     ibuprofen (ADVIL,MOTRIN) 600 MG tablet, Take 1 tablet by mouth Every 6 (Six) Hours As Needed for Mild Pain ., Disp: 15 tablet, Rfl: 0    ketorolac (ACULAR) 0.5 % ophthalmic solution, , Disp: , Rfl:     letrozole (FEMARA) 2.5 MG tablet, TAKE 1 TABLET BY MOUTH DAILY, Disp: 90 tablet, Rfl: 3    lidocaine-prilocaine (EMLA) 2.5-2.5 % cream, Apply 1 application topically to the appropriate area as directed As Needed for Injection Site Pain. Apply 45-60 minutes before port access, cover  "with plastic wrap after application., Disp: 5 g, Rfl: 5    lisinopril (PRINIVIL,ZESTRIL) 40 MG tablet, Take 1 tablet by mouth Daily., Disp: , Rfl:     mupirocin (BACTROBAN) 2 % ointment, APPLY TOPICALLY TO THE AFFECTED AREA(S) THREE TIMES DAILY FOR 7 DAYS, Disp: , Rfl:     NIFEdipine CC (ADALAT CC) 30 MG 24 hr tablet, Take 1 tablet by mouth Every Morning., Disp: , Rfl:     NIFEdipine XL (PROCARDIA XL) 30 MG 24 hr tablet, Take 1 tablet by mouth Daily., Disp: , Rfl:     pantoprazole (PROTONIX) 40 MG EC tablet, , Disp: , Rfl:     pravastatin (PRAVACHOL) 80 MG tablet, , Disp: , Rfl: 1    ursodiol (ACTIGALL) 500 MG tablet, , Disp: , Rfl:     apixaban (ELIQUIS) 2.5 MG tablet tablet, Take 1 tablet by mouth 2 (Two) Times a Day., Disp: 60 tablet, Rfl: 5    Current Facility-Administered Medications:     lidocaine (XYLOCAINE) 1 % injection 5 mL, 5 mL, Infiltration, Once, Svetlana Juárez MD    Pain Medications               acetaminophen (Tylenol 8 Hour) 650 MG 8 hr tablet Take 1 tablet by mouth Every 8 (Eight) Hours As Needed for Mild Pain .    ibuprofen (ADVIL,MOTRIN) 600 MG tablet Take 1 tablet by mouth Every 6 (Six) Hours As Needed for Mild Pain .               ALLERGIES:  No Known Allergies      Physical Exam    VITAL SIGNS:  /86 Comment: LUE  Pulse 54   Temp 98 °F (36.7 °C) (Infrared)   Resp 24   Ht 160 cm (63\")   Wt 72.6 kg (160 lb)   SpO2 98% Comment: RA  BMI 28.34 kg/m²     ECOG score: 0           Wt Readings from Last 3 Encounters:   09/11/24 72.6 kg (160 lb)   05/30/24 72.1 kg (159 lb)   03/20/24 69.9 kg (154 lb)       Body mass index is 28.34 kg/m². Body surface area is 1.76 meters squared.    Pain Score    09/11/24 1355   PainSc: 0-No pain           Performance Status: 0    General: well appearing, in no acute distress  HEENT: sclera anicteric, neck is supple  Lymphatics: no cervical, supraclavicular, or axillary adenopathy  Cardiovascular: regular rate and rhythm, no murmurs, rubs or gallops  Lungs: " clear to auscultation bilaterally  Abdomen: soft, nontender, nondistended.  No palpable organomegaly  Extremities: no lower extremity edema  Skin: no rashes, lesions, bruising, or petechiae  Msk:  Shows no weakness of the large muscle groups  Psych: Mood is stable        RECENT LABS:    Lab Results   Component Value Date    HGB 11.5 05/30/2024    HCT 34.6 05/30/2024    MCV 94 05/30/2024     (L) 05/30/2024    WBC 6.28 05/30/2024    NEUTROABS 4.37 05/30/2024    LYMPHSABS 1.13 (L) 05/30/2024    MONOSABS 0.64 05/30/2024    EOSABS 0.11 05/30/2024    BASOSABS 0.02 05/30/2024       Lab Results   Component Value Date    GLUCOSE 134 (H) 07/27/2022    BUN 21 07/27/2022    CREATININE 0.90 03/20/2024     (L) 07/27/2022    K 4.4 07/27/2022    CL 98 07/27/2022    CO2 23.0 07/27/2022    CALCIUM 9.2 07/27/2022    PROTEINTOT 6.6 07/27/2022    ALBUMIN 3.80 07/27/2022    BILITOT 0.7 07/27/2022    ALKPHOS 55 07/27/2022    AST 17 07/27/2022    ALT 13 07/27/2022     CT Chest With Contrast Diagnostic    Result Date: 6/7/2023  Impression: 1. New left lower lobe volume loss with an area of increasing masslike consolidation in the left infrahilar region. On the previous study, there was a discrete lung mass in the left infrahilar region which has been previously biopsied. On today's study, the masslike consolidation extends along the bronchovascular structures measuring 3.0 x 3.3 cm in diameter. This is concerning for malignancy. I would consider bronchoscopic evaluation and a repeat biopsy. 2. There are postsurgical changes in both breasts. 3. There is a combination of chronic scarring and subsegmental atelectasis in both lungs. 4. There are healing fractures of the posterior 10th through 12th ribs on the right side at the costovertebral angle. 5. No evidence of metastatic disease within the abdomen or pelvis. 6. Multiple additional findings in the chest, abdomen, and pelvis as noted above. Electronically Signed: Feliciano Morfin   6/7/2023 10:55 AM EDT  Workstation ID: [id number]    CT Abdomen Pelvis With Contrast    Result Date: 6/7/2023  Impression: 1. New left lower lobe volume loss with an area of increasing masslike consolidation in the left infrahilar region. On the previous study, there was a discrete lung mass in the left infrahilar region which has been previously biopsied. On today's study, the masslike consolidation extends along the bronchovascular structures measuring 3.0 x 3.3 cm in diameter. This is concerning for malignancy. I would consider bronchoscopic evaluation and a repeat biopsy. 2. There are postsurgical changes in both breasts. 3. There is a combination of chronic scarring and subsegmental atelectasis in both lungs. 4. There are healing fractures of the posterior 10th through 12th ribs on the right side at the costovertebral angle. 5. No evidence of metastatic disease within the abdomen or pelvis. 6. Multiple additional findings in the chest, abdomen, and pelvis as noted above. Electronically Signed: Feliciano Morfin  6/7/2023 10:55 AM EDT  Workstation ID: [id number]    Mammo Diagnostic Digital Tomosynthesis Bilateral With CAD    Result Date: 4/21/2023  Presumed postsurgical changes bilaterally, no mammographic findings worrisome for malignancy.  ACR BI-RADS CATEGORY:  3, PROBABLY BENIGN  RECOMMENDATION:  Six-month short-term interval bilateral mammographic follow-up according to the lumpectomy protocol.   CAD was utilized.  The standard false-negative rate of mammography is between 10% and 25%. Complex patterns or increased breast density will markedly elevate the false-negative rate of mammography.    A letter, in lay terminology, with the results of this exam was given to the patient at the time of the visit.  ________________________________________________________________________ _ Physician Order  Diagnostic 6 Month follow up Mammogram with Breast Ultrasound if needed.  Diagnosis: Abnormal Mammogram  This report was  finalized on 4/21/2023 1:36 PM by Maegan Holloway MD.            Final Diagnosis   1.  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  See template #1    2.  LEFT BREAST, NEEDLE LOCALIZED LUMPECTOMY:                 Invasive poorly differentiated ductal carcinoma (Fargo score 8/9)                 Gross tumor size 28 mm                Background high-grade ductal carcinoma in situ with involvement of papillomatous lesion                 Invasive ductal carcinoma less than 1 mm from superior margin and medial margin                 Invasive ductal carcinoma 2 mm from inferior margin                 Ductal carcinoma in situ extends to posterior margin                 Biopsy cavity identified.  See template #2    3.  LEFT BREAST, EXTENDED MEDIAL, SUPERIOR, AND DEEP MARGIN:                 Focal residual carcinoma measuring 3 mm near anterior aspect                New anterior margin width is 9 mm    INVASIVE BREAST CANCER STAGING TEMPLATE #1    TYPE OF SPECIMEN/PROCEDURE: Needle localized lumpectomy  SPECIMEN LATERALITY: Right  TUMOR SITE: 11:00  TUMOR SIZE: 11 mm  TUMOR FOCALITY: Single focus  HISTOLOGIC TYPE OF INVASIVE CARCINOMA: Ductal with apocrine morphology  stGstRstAstDstEst:st st1st Tubule formation: 3                Mitotic activity: 1                Pleomorphism: 3  OVERALL SCORE: 7/9  DUCTAL CARCINOMA IN SITU EXTENT: Focal  TUMOR EXTENSION: Structures not present                Skin: N/A                Skin satellite nodule: N/A                Nipple: N/a                Skeletal muscle: N/A  SURGICAL MARGINS: Invasive carcinoma present at margin                Invasive  carcinoma margins: Tumor present at margin                Distance from closest margin: 0 mm                Specific closest margin: Posterior                   DCIS margins: Uninvolved by DCIS                Distance from closest margin: 6 mm                Specific closest margin: Posterior    LYMPH NODE STATUS: None submitted                Number of lymph nodes with macrometastasis: N/A                Number of lymph nodes with micrometastasis: N/A                Number of lymph nodes with isolated tumor cells: N/A  TOTAL NUMBER OF LYMPH NODES EXAMINED: 0  NUMBER OF SENTINEL NODES EXAMINED: 0  EXTRANODAL EXTENSION OF TUMOR: N/A  SIZE OF LARGEST ENOCH METASTATIC DEPOSIT: N/A  VASCULAR/LYMPHATIC INVASION: N/A  DISTANT SITES INVOLVED: Not applicable  ESTROGEN RECEPTOR STATUS BY IHC METHOD: Negative per previous biopsy  PROGESTERONE RECEPTOR STATUS BY IHC METHOD: Negative per previous biopsy  HER-2/LAURA ONCOPROTEIN STATUS BY IHC METHOD: Equivocal (2+) per previous biopsy  HER-2/LAURA ONCOGENE STATUS BY IN SITU HYBRIDIZATION ANALYSIS: Not amplified  BIOPSY UNDER WHICH BREAST BIOMARKERS PERFORMED: SX   TREATMENT EFFECT (BREAST): No known presurgical therapy  TREATMENT EFFECT (LYMPH NODE): No known presurgical therapy  ADDITIONAL PATHOLOGIC FINDINGS: Fibrocystic change  OTHER STUDIES: Available upon request  AJCC PATHOLOGIC STAGE: (COMPLETED BY PATHOLOGIST, BASED ONLY ON TISSUE FINDINGS; MORE EXTENSIVE DISEASE MAY NOT BE KNOWN TO THE PATHOLOGIST)  pT = 1c  pN = x  pM = N/A      INVASIVE BREAST CANCER STAGING TEMPLATE #2    TYPE OF SPECIMEN/PROCEDURE: Needle localized lumpectomy  SPECIMEN LATERALITY: Left  TUMOR SITE: 2:00  TUMOR SIZE: 28 mm  TUMOR FOCALITY: Single focus  HISTOLOGIC TYPE OF INVASIVE CARCINOMA: Ductal  ndGndRndAndDndEnd:nd nd2nd Tubule formation: 3                Mitotic activity: 2                Pleomorphism: 3  OVERALL SCORE: 8/9  DUCTAL CARCINOMA IN SITU EXTENT: 12 mm  TUMOR EXTENSION: Structures not  present                Skin: N/A                Skin satellite nodule: N/A                Nipple: N/A                Skeletal muscle: N/A  SURGICAL MARGINS: Final margins uninvolved by in situ or invasive neoplasm                Invasive carcinoma margins: Extended margin uninvolved                Distance from closest margin: Extended margin 9 mm                Specific closest margin: Anterior                   DCIS margins: Uninvolved                Distance from closest margin: 12 mm                Specific closest margin: Posterior    LYMPH NODE STATUS: No lymph nodes submitted                Number of lymph nodes with macrometastasis: N/A                Number of lymph nodes with micrometastasis: N/A                Number of lymph nodes with isolated tumor cells: N/A  TOTAL NUMBER OF LYMPH NODES EXAMINED: 0  NUMBER OF SENTINEL NODES EXAMINED: 0  EXTRANODAL EXTENSION OF TUMOR: N/A  SIZE OF LARGEST ENOCH METASTATIC DEPOSIT: N/A  VASCULAR/LYMPHATIC INVASION: N/A  DISTANT SITES INVOLVED: N/A  ESTROGEN RECEPTOR STATUS BY IHC METHOD: Positive per previous biopsy  PROGESTERONE RECEPTOR STATUS BY IHC METHOD: Negative per previous biopsy  HER-2/LAURA ONCOPROTEIN STATUS BY IHC METHOD: Negative (0+) per previous biopsy   HER-2/LAURA ONCOGENE STATUS BY IN SITU HYBRIDIZATION ANALYSIS: Not performed  BIOPSY UNDER WHICH BREAST BIOMARKERS PERFORMED: [id number]  TREATMENT EFFECT (BREAST): No known presurgical therapy  TREATMENT EFFECT (LYMPH NODE): No known presurgical therapy  ADDITIONAL PATHOLOGIC FINDINGS: None significant  OTHER STUDIES: Available upon request  AJCC PATHOLOGIC STAGE: (COMPLETED BY PATHOLOGIST, BASED ONLY ON TISSUE FINDINGS; MORE EXTENSIVE DISEASE MAY NOT BE KNOWN TO THE PATHOLOGIST)  pT = 2  pN = x  pM = not applicable      CT Abdomen Pelvis With Contrast    Result Date: 3/20/2024  Impression: 1.No definitive evidence of disease progression. Left infrahilar lesion appears to be similar in size although  difficult to give exact measurements due to surrounding atelectasis and/or scarring. There is persistent left lower lobe bronchial cutoff. 2.There is left lower lobe pulmonary arterial thrombus. This may represent tumor thrombus, bland thrombus, or embolic disease. 3.Decrease in size of presumed right breast seroma. 4.Other stable chronic findings. No evidence of metastatic disease in the abdomen or pelvis. Electronically Signed: Shad Anaya MD  3/20/2024 10:41 AM EDT  Workstation ID: [id number]    CT Chest With Contrast Diagnostic    Result Date: 3/20/2024  Impression: 1.No definitive evidence of disease progression. Left infrahilar lesion appears to be similar in size although difficult to give exact measurements due to surrounding atelectasis and/or scarring. There is persistent left lower lobe bronchial cutoff. 2.There is left lower lobe pulmonary arterial thrombus. This may represent tumor thrombus, bland thrombus, or embolic disease. 3.Decrease in size of presumed right breast seroma. 4.Other stable chronic findings. No evidence of metastatic disease in the abdomen or pelvis. Electronically Signed: Shad Anaya MD  3/20/2024 10:41 AM EDT  Workstation ID: [id number]      CT Chest With Contrast Diagnostic    Result Date: 9/11/2024  Impression: 1. Interval resolution of previously noted left pulmonary thrombus/embolus. 2. Stable left lower lung discoid atelectasis/scarring, and small foci of scarring and peribronchial thickening elsewhere. 3. No evidence of recurrent malignancy or other new chest disease elsewhere CT SCAN ABDOMEN PELVIS WITH IV CONTRAST: Liver appears unremarkable except for mild diffuse fatty change. Gallbladder, spleen, and adrenal glands appear within normal limits. Pancreas appears normal except for a subtle small elongated cyst or more likely  2 adjacent cysts, best seen on coronal image 42 series 900, combined diameter of approximately 0.6 x 1.9 cm. No other pancreatic lesions are seen. This  is very subtle, but probably present on multiple prior studies back to 2022 and minimally if at all changed. There are stable left renal cysts. Kidneys otherwise appear unremarkable. No upper abdominal adenopathy ascites or acute inflammatory changes seen. Regarding lower abdomen/pelvis, bowel loops are normal in caliber and appearance. No mass adenopathy or inflammatory changes seen. Bladder is normally distended. Uterus and ovaries are not identified. Delayed venous phase images show no evidence of obstructive uropathy. Terminal ileum cecum and appendix appear normal. Delayed venous phase images show no evidence of obstructive uropathy. Bony structures appear to be intact. IMPRESSION: 1. No evidence of malignancy/metastasis in the abdomen or pelvis. 2. Incidentally noted subtle, small pancreatic cyst,, minimally if at all increased since 2022. Imaging follow-up guidelines as noted below. Per American College of Gastroenterology Guidelines for Pancreatic Cyst Follow up: - Dedicated MRI Pancreas or less preferably CT Abdomen Pancreas protocol for more definitive characterization of not previously performed. -Cysts < 1cm: MRI or less preferably CT follow up in 2 years -Cysts 1-2 cm: MRI or less preferably CT follow up in 1 year. -Cysts 2-3 cm: MRI or less preferably CT or EUS every 6-12 mo. -Cysts >3 cm: Referral to multidisciplinary group with MRI or less preferably CT and EUS every 6 months. If suspicious features are present such as main duct diameter greater than 5 mm, cyst size greater then 3 cm or change in main duct caliber with upstream atrophy, EUS would likely be indicated. Electronically Signed: Zev Dunham MD  9/11/2024 2:28 PM EDT  Workstation ID: [id number]    CT Abdomen Pelvis With Contrast    Result Date: 9/11/2024  Impression: 1. Interval resolution of previously noted left pulmonary thrombus/embolus. 2. Stable left lower lung discoid atelectasis/scarring, and small foci of scarring and peribronchial  thickening elsewhere. 3. No evidence of recurrent malignancy or other new chest disease elsewhere CT SCAN ABDOMEN PELVIS WITH IV CONTRAST: Liver appears unremarkable except for mild diffuse fatty change. Gallbladder, spleen, and adrenal glands appear within normal limits. Pancreas appears normal except for a subtle small elongated cyst or more likely  2 adjacent cysts, best seen on coronal image 42 series 900, combined diameter of approximately 0.6 x 1.9 cm. No other pancreatic lesions are seen. This is very subtle, but probably present on multiple prior studies back to 2022 and minimally if at all changed. There are stable left renal cysts. Kidneys otherwise appear unremarkable. No upper abdominal adenopathy ascites or acute inflammatory changes seen. Regarding lower abdomen/pelvis, bowel loops are normal in caliber and appearance. No mass adenopathy or inflammatory changes seen. Bladder is normally distended. Uterus and ovaries are not identified. Delayed venous phase images show no evidence of obstructive uropathy. Terminal ileum cecum and appendix appear normal. Delayed venous phase images show no evidence of obstructive uropathy. Bony structures appear to be intact. IMPRESSION: 1. No evidence of malignancy/metastasis in the abdomen or pelvis. 2. Incidentally noted subtle, small pancreatic cyst,, minimally if at all increased since 2022. Imaging follow-up guidelines as noted below. Per American College of Gastroenterology Guidelines for Pancreatic Cyst Follow up: - Dedicated MRI Pancreas or less preferably CT Abdomen Pancreas protocol for more definitive characterization of not previously performed. -Cysts < 1cm: MRI or less preferably CT follow up in 2 years -Cysts 1-2 cm: MRI or less preferably CT follow up in 1 year. -Cysts 2-3 cm: MRI or less preferably CT or EUS every 6-12 mo. -Cysts >3 cm: Referral to multidisciplinary group with MRI or less preferably CT and EUS every 6 months. If suspicious features are  present such as main duct diameter greater than 5 mm, cyst size greater then 3 cm or change in main duct caliber with upstream atrophy, EUS would likely be indicated. Electronically Signed: Zev Dunham MD  9/11/2024 2:28 PM EDT  Workstation ID: [id number]    US Liver    Result Date: 6/26/2024  No hepatic mass. Category Score US-1 Negative. No US evidence of HCC. No observation or Only definitely benign observation(s). Continue with regular screening. Visualization Score Vis B. Moderate limitations. Limitations may obscure small masses. The above scoring system and recommendations are based on Ultrasound LI-RADS version 2017. [email or web address]/-/[email or web address] CRITICAL RESULT: No. COMMUNICATION: Per this written report. By electronically signing this report, I, the attending physician, attest that I have personally reviewed the images/data for the above examination(s) and agree with the final edited report. Drafted by Meghan Patel DO on 6/26/2024 9:29 AM Final report signed by Skye Guerrero MD on 6/26/2024 1:09 PM    Mammo Diagnostic Digital Tomosynthesis Bilateral With CAD    Result Date: 5/21/2024  Some improvement in the appearance of the right upper outer quadrant lumpectomy bed undergoing follow-up as described above. Stable presumed post lumpectomy changes on the left.  RECOMMENDATION: Recommend an additional follow-up bilateral diagnostic mammogram in 6 months. The patient is also a candidate for annual intermediate risk screening breast MRI.  ACR BI-RADS CATEGORY: 3, PROBABLY BENIGN  CAD was utilized.  The standard false-negative rate of mammography is between 10% and 25%. Complex patterns or increased breast density will markedly elevate the false-negative rate of mammography.    A letter, in lay terminology, with the results of this exam was given to the patient at the time of the visit.  At our facility, a triangular marker is positioned over a  palpable area of concern indicated by the patient. A [place] marker is placed over a visible skin lesion. A linear marker indicates a scar.  ________________________________________________________________________ _ Physician Order  Diagnostic 6 Month follow up Mammogram with Breast Ultrasound if needed.  Diagnosis: Abnormal Mammogram  This report was finalized on 5/21/2024 12:09 PM by Dr. Barbi Reed MD.          Assessment/Plan    1.  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   completed adjuvant chemotherapy and has now completed  adjuvant radiotherapy.  Mammogram in April looked reasonable.  Currently on letrozole adjuvantly.  She is now 3.5 years out from her disease.  I reviewed her scans which shows no sign of recurrent disease.    2.  Metastatic endometrial cancer.  She had a single focus of disease in her lung.  This was treated with CyberKnife.  I plan to keep her on Femara for now.  She is estrogen receptor positive on the biopsy.  I looked at the scans. This appears to be consistent with radiation changes to the region after CyberKnife.   I think it is reasonable to wait 6 months to scan her again.    3.  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  Continue Eliquis.  I will lower her Eliquis to 2.5 mg twice daily.        Ruby Trevino MD  [facility] Hematology [facility] On[facility]    Return in (Approximately): 6 months    Orders Placed This Encounter   Procedures    CT Chest With Contrast Diagnostic       9/12/2024

## 2024-11-20 ENCOUNTER — HOSPITAL ENCOUNTER (OUTPATIENT)
Dept: MAMMOGRAPHY | Facility: HOSPITAL | Age: 79
Discharge: HOME | End: 2024-11-20
Admitting: RADIOLOGY
Payer: MEDICARE

## 2024-11-20 DIAGNOSIS — R92.8 ABNORMAL MAMMOGRAM: ICD-10-CM

## 2024-11-20 PROCEDURE — 77066 DX MAMMO INCL CAD BI: CPT | Performed by: RADIOLOGY

## 2024-11-20 PROCEDURE — G0279 TOMOSYNTHESIS, MAMMO: HCPCS | Performed by: RADIOLOGY

## 2024-11-20 PROCEDURE — G0279 TOMOSYNTHESIS, MAMMO: HCPCS

## 2024-11-20 PROCEDURE — 77066 DX MAMMO INCL CAD BI: CPT

## 2024-12-02 DIAGNOSIS — C50.411 MALIGNANT NEOPLASM OF UPPER-OUTER QUADRANT OF BOTH BREASTS IN FEMALE, ESTROGEN RECEPTOR POSITIVE: Primary | ICD-10-CM

## 2024-12-02 DIAGNOSIS — C50.412 MALIGNANT NEOPLASM OF UPPER-OUTER QUADRANT OF BOTH BREASTS IN FEMALE, ESTROGEN RECEPTOR POSITIVE: Primary | ICD-10-CM

## 2024-12-02 DIAGNOSIS — Z17.0 MALIGNANT NEOPLASM OF UPPER-OUTER QUADRANT OF BOTH BREASTS IN FEMALE, ESTROGEN RECEPTOR POSITIVE: Primary | ICD-10-CM

## 2024-12-04 RX ORDER — LETROZOLE 2.5 MG/1
TABLET, FILM COATED ORAL
Qty: 90 TABLET | Refills: 3 | Status: SHIPPED | OUTPATIENT
Start: 2024-12-04

## 2025-01-13 DIAGNOSIS — Z17.0 MALIGNANT NEOPLASM OF UPPER-OUTER QUADRANT OF BOTH BREASTS IN FEMALE, ESTROGEN RECEPTOR POSITIVE: ICD-10-CM

## 2025-01-13 DIAGNOSIS — C50.411 MALIGNANT NEOPLASM OF UPPER-OUTER QUADRANT OF BOTH BREASTS IN FEMALE, ESTROGEN RECEPTOR POSITIVE: ICD-10-CM

## 2025-01-13 DIAGNOSIS — C50.412 MALIGNANT NEOPLASM OF UPPER-OUTER QUADRANT OF BOTH BREASTS IN FEMALE, ESTROGEN RECEPTOR POSITIVE: ICD-10-CM

## 2025-01-13 RX ORDER — LETROZOLE 2.5 MG/1
TABLET, FILM COATED ORAL
Qty: 90 TABLET | Refills: 3 | Status: SHIPPED | OUTPATIENT
Start: 2025-01-13

## 2025-01-28 NOTE — TELEPHONE ENCOUNTER
Caller: Kenisha Jama    Relationship: Self    Best call back number: [phone]    Requested Prescriptions:   Requested Prescriptions     Pending Prescriptions Disp Refills    apixaban (ELIQUIS) 2.5 MG tablet tablet 60 tablet 5     Sig: Take 1 tablet by mouth 2 (Two) Times a Day.      Pharmacy where request should be sent: [facility] [address] [phone] [facility] [phone]      Last office visit with prescribing clinician: 9/11/2024   Last telemedicine visit with prescribing clinician: Visit date not found   Next office visit with prescribing clinician: 3/17/2025     Does the patient have less than a 3 day supply:  [] Yes  [x] No    Would you like a call back once the refill request has been completed: [] Yes [x] No    If the office needs to give you a call back, can they leave a voicemail: [] Yes [x] No         KIT CHAIN OF CUSTODY MAINTAINED - HANDED OFF @ 9046 TO OFFICER PAM 1540 & OFFICER FACUNDO 0152 - PT GIVEN EDUCATION AND DISCHARGE INFORMATION - PT GAVE PERMISSION TO RN TO HAVE EDUCATION AND DISCHARGE INFORMATION EXPLAINED IN FRONT OF THEM.

## 2025-02-13 ENCOUNTER — TELEPHONE (OUTPATIENT)
Dept: MRI IMAGING | Facility: HOSPITAL | Age: 80
End: 2025-02-13
Payer: MEDICARE

## 2025-02-13 NOTE — TELEPHONE ENCOUNTER
Returned patient's call about scheduling her breast MRI. I don't have an order for this and she said Dr. Reed would be the one to order it. I explained she would want to reach out to that office because they might need her to make an appt to do that order. She could also check with another provider she might be seeing. She was interested in waiting until after her May 2025 MMG. I also emailed for the breast center to give the patient a call to get that scheduled per patient request.

## 2025-03-17 ENCOUNTER — TELEPHONE (OUTPATIENT)
Age: 80
End: 2025-03-17

## 2025-03-17 NOTE — TELEPHONE ENCOUNTER
Caller: Kenisha Jama    Relationship to patient: Self    Best call back number: [phone]    Chief complaint: PATIENT CALLED TO RESCHEDULE     Type of visit: FOLLOW UP 2    If rescheduling, when is the original appointment: 3-17-25

## 2025-04-21 ENCOUNTER — OFFICE VISIT (OUTPATIENT)
Age: 80
End: 2025-04-21
Payer: MEDICARE

## 2025-04-21 ENCOUNTER — HOSPITAL ENCOUNTER (OUTPATIENT)
Facility: HOSPITAL | Age: 80
Discharge: HOME | End: 2025-04-21
Admitting: INTERNAL MEDICINE
Payer: MEDICARE

## 2025-04-21 VITALS
BODY MASS INDEX: 27.82 KG/M2 | TEMPERATURE: 97.7 F | OXYGEN SATURATION: 99 % | SYSTOLIC BLOOD PRESSURE: 183 MMHG | HEART RATE: 58 BPM | DIASTOLIC BLOOD PRESSURE: 101 MMHG | WEIGHT: 157 LBS | RESPIRATION RATE: 20 BRPM | HEIGHT: 63 IN

## 2025-04-21 DIAGNOSIS — C50.412 MALIGNANT NEOPLASM OF UPPER-OUTER QUADRANT OF BOTH BREASTS IN FEMALE, ESTROGEN RECEPTOR POSITIVE: ICD-10-CM

## 2025-04-21 DIAGNOSIS — C54.1 ENDOMETRIAL CANCER: Primary | ICD-10-CM

## 2025-04-21 DIAGNOSIS — Z17.0 MALIGNANT NEOPLASM OF UPPER-OUTER QUADRANT OF BOTH BREASTS IN FEMALE, ESTROGEN RECEPTOR POSITIVE: ICD-10-CM

## 2025-04-21 DIAGNOSIS — I26.99 PULMONARY EMBOLISM, UNSPECIFIED CHRONICITY, UNSPECIFIED PULMONARY EMBOLISM TYPE, UNSPECIFIED WHETHER ACUTE COR PULMONALE PRESENT: ICD-10-CM

## 2025-04-21 DIAGNOSIS — C50.411 MALIGNANT NEOPLASM OF UPPER-OUTER QUADRANT OF BOTH BREASTS IN FEMALE, ESTROGEN RECEPTOR POSITIVE: ICD-10-CM

## 2025-04-21 PROCEDURE — 99215 OFFICE O/P EST HI 40 MIN: CPT | Performed by: NURSE PRACTITIONER

## 2025-04-21 PROCEDURE — 1159F MED LIST DOCD IN RCRD: CPT | Performed by: NURSE PRACTITIONER

## 2025-04-21 PROCEDURE — 1160F RVW MEDS BY RX/DR IN RCRD: CPT | Performed by: NURSE PRACTITIONER

## 2025-04-21 PROCEDURE — 1126F AMNT PAIN NOTED NONE PRSNT: CPT | Performed by: NURSE PRACTITIONER

## 2025-04-21 PROCEDURE — 71260 CT THORAX DX C+: CPT

## 2025-04-21 PROCEDURE — G2211 COMPLEX E/M VISIT ADD ON: HCPCS | Performed by: NURSE PRACTITIONER

## 2025-04-21 PROCEDURE — 25510000001 IOPAMIDOL 61 % SOLUTION: Performed by: INTERNAL MEDICINE

## 2025-04-21 RX ORDER — IOPAMIDOL 612 MG/ML
100 INJECTION, SOLUTION INTRAVASCULAR
Status: COMPLETED | OUTPATIENT
Start: 2025-04-21 | End: 2025-04-21

## 2025-04-21 RX ADMIN — IOPAMIDOL 75 ML: 612 INJECTION, SOLUTION INTRAVENOUS at 10:15

## 2025-04-21 NOTE — PROGRESS NOTES
PROBLEM LIST:  Oncology/Hematology History   Endometrial cancer   9/30/2011 Imaging    TVUS and CT scan for palpable left pelvic mass upon annual exam and new abdominal symptoms.      10/6/2011 Surgery    ADY/BSO with pelvic lymph node dissection. Stage 1A grade 2 endometrial adenocarcinoma by final pathology     1/26/2022 Imaging    IMPRESSION:     Hypermetabolic soft tissue nodule in the left breast compatible with  biopsy-proven invasive ductal carcinoma. There is diffuse low level FDG  uptake in the region of recent right breast biopsy site. A  hypermetabolic mass in the left lower lobe is suspicious for pulmonary  metastasis. No additional sites of metastases are identified. There is  no evidence of discrete/focal hypermetabolic lesion of the sternum to  correspond with the indeterminant sternal lesion described on breast MRI  exam.     2/14/2022 Biopsy    Final Diagnosis   LEFT LUNG, BIOPSY:  Metastatic adenocarcinoma.  See comment.  GJK   Electronically signed by Wolfgang Hair MD on 2/22/2022 at 1154   Comment    Sections show small fragments of tumor that are morphologically distinct from the patient's known breast cancers.  Immunoprofile suggests mullerian origin.  Clinical correlation required.  This case was shown for intradepartmental consultation and the other pathologist concurs with the findings interpretation.  This case was discussed with Dr. Trevino on 02/21/22.  This case was discussed with Dr. Reed on 02/22/22.        5/4/2022 - 7/13/2022 Chemotherapy    OP GYN PACLitaxel / CARBOplatin AUC=2 (Weekly)     5/4/2022 -  Chemotherapy    OP CENTRAL VENOUS ACCESS DEVICE ACCESS, CARE, AND MAINTENANCE (CVAD)     Malignant neoplasm of upper-outer quadrant of both breasts in female, estrogen receptor positive   1/21/2022 Initial Diagnosis    Malignant neoplasm of upper-outer quadrant of both breasts in female, estrogen receptor positive (HCC)     1/24/2022 Biopsy    1.  Right breast cancer-invasive  ductal carcinoma grade 2-ER negative RI negative HER-2 negative    2.  Left breast cancer-invasive ductal carcinoma grade 2-ER positive RI negative HER-2 negative     1/26/2022 Imaging    EXAMINATION: NM PET/CT SKULL BASE TO MID THIGH      FINDINGS:      Today's 3-D images demonstrate focal hypermetabolism in the soft tissues  of the left breast as well as focal hypermetabolism within the left  midlung.     Multiplanar images of the head and neck demonstrate symmetric FDG uptake  within the brain. There is no evidence of hypermetabolic neck mass or  lymph node.     In the upper outer quadrant of the left breast there is redemonstration  of a irregular 2.1 cm soft tissue nodule with FDG hypermetabolism of SUV  max 4.2, compatible with biopsy-proven invasive ductal carcinoma. There  is an ill-defined diffuse region of low-level hypermetabolism in the  right breast with adjacent biopsy clip and single locule of soft tissue  air, compatible with recent right breast biopsy. A discrete  hypermetabolic nodule or mass in the right breast is not confidently  identified. There is no hypermetabolic or enlarged axillary lymph nodes.     Within the chest there is redemonstration of a lobulated soft tissue  mass in the superior aspect of the left lower lobe which appears  hypermetabolic with SUV max 6.5. There is no evidence of hypermetabolic  mediastinal or hilar adenopathy.     Below the diaphragm there is expected physiologic uptake within the   and GI tracts. No evidence of abdominal pelvic malignancy or metastasis.  No hypermetabolic abdominopelvic lymph nodes. Photopenic left renal cyst  is noted.     There is no hypermetabolic pathologic marrow process. Specifically,  there is no evidence of hypermetabolic lesion of the sternum to  correspond with the indeterminant sternal lesion detailed on prior  breast MRI exam. FDG uptake at the left antecubital fossa reflects site  of IV administration.     IMPRESSION:      Hypermetabolic soft tissue nodule in the left breast compatible with  biopsy-proven invasive ductal carcinoma. There is diffuse low level FDG  uptake in the region of recent right breast biopsy site. A  hypermetabolic mass in the left lower lobe is suspicious for pulmonary  metastasis. No additional sites of metastases are identified. There is  no evidence of discrete/focal hypermetabolic lesion of the sternum to  correspond with the indeterminant sternal lesion described on breast MRI  exam.           4/20/2022 Cancer Staged    Staging form: Breast, AJCC 8th Edition  - Pathologic: Stage IB (pT1c, pN0, cM0, G3, ER-, WV-, HER2-) - Signed by Ruby Trevino MD on 4/20/2022 5/4/2022 -  Chemotherapy    OP CENTRAL VENOUS ACCESS DEVICE ACCESS, CARE, AND MAINTENANCE (CVAD)     Malignant neoplasm metastatic to left lung (Resolved)   3/23/2022 Initial Diagnosis    Malignant neoplasm metastatic to left lung (HCC) (Resolved)     4/26/2022 - 4/26/2022 Radiation    Radiation OncologyTreatment Course:  Kenisha Jama received 2500 cGy in 1 fraction to left lung metastasis via Stereotactic Radiation Therapy - SRT.         REASON FOR VISIT: Management of my cancers     HISTORY OF PRESENT ILLNESS:   79 y.o.  female presents today for follow-up of her cancers.  History of endometrial cancer in 2011 s/p ADY/BSO with pelvic node dissection and bilateral breast cancer s/p lumpectomy 2022.  She did not tolerate breast cancer treatment with carboTaxol adjuvantly due to low counts.  She completed radiotherapy in [place].  She is on letrozole therapy.  History of solitary lesion in the lung from endometrial cancer s/p CyberKnife therapy April 2022.  Plans for follow up mammogram in May for completion of 2 year post breast conservation surgery.  She no longer follows with Dr. Reed with no recent clinical breast exam.  She does not perform monthly exams.  In March 2024, incidental finding on follow up imaging of left lower  lobe PE.  She was started on Eliquis and down to 2.5 mg po BID.  Denies any pain or bleeding.  Follows with hepatology at  for Cirrhosis PBC/MASLD    Past medical history, social history and family history was reviewed 04/21/25 and unchanged from prior visit.    Review of Systems:    Review of Systems   Constitutional: Negative.    HENT:  Negative.     Eyes: Negative.    Respiratory: Negative.     Cardiovascular: Negative.    Gastrointestinal: Negative.    Endocrine: Negative.    Genitourinary: Negative.     Musculoskeletal: Negative.    Skin: Negative.    Neurological: Negative.    Hematological: Negative.    Psychiatric/Behavioral: Negative.              Medications:        Current Outpatient Medications:     acetaminophen (Tylenol 8 Hour) 650 MG 8 hr tablet, Take 1 tablet by mouth Every 8 (Eight) Hours As Needed for Mild Pain ., Disp: 40 tablet, Rfl: 0    apixaban (ELIQUIS) 2.5 MG tablet tablet, Take 1 tablet by mouth 2 (Two) Times a Day., Disp: 60 tablet, Rfl: 5    apixaban (ELIQUIS) 5 MG tablet tablet, Take 1 tablet by mouth 2 (Two) Times a Day., Disp: 60 tablet, Rfl: 5    Apixaban Starter Pack (Eliquis DVT/PE Starter Pack) tablet therapy pack, Take two 5 mg tablets by mouth every 12 hours for 7 days. Followed by one 5 mg tablet every 12 hours., Disp: 74 tablet, Rfl: 0    atorvastatin (LIPITOR) 20 MG tablet, Take 1 tablet by mouth Every Evening., Disp: , Rfl:     carvedilol (COREG) 12.5 MG tablet, Take 1 tablet by mouth 2 (Two) Times a Day With Meals., Disp: , Rfl:     ezetimibe (ZETIA) 10 MG tablet, Take 1 tablet by mouth Every Night., Disp: , Rfl:     ferrous sulfate 325 (65 FE) MG tablet, Take 1 tablet by mouth Daily With Breakfast., Disp: , Rfl:     ibuprofen (ADVIL,MOTRIN) 600 MG tablet, Take 1 tablet by mouth Every 6 (Six) Hours As Needed for Mild Pain ., Disp: 15 tablet, Rfl: 0    ketorolac (ACULAR) 0.5 % ophthalmic solution, , Disp: , Rfl:     letrozole (FEMARA) 2.5 MG tablet, TAKE 1 TABLET BY MOUTH  "DAILY, Disp: 90 tablet, Rfl: 3    lidocaine-prilocaine (EMLA) 2.5-2.5 % cream, Apply 1 application topically to the appropriate area as directed As Needed for Injection Site Pain. Apply 45-60 minutes before port access, cover with plastic wrap after application., Disp: 5 g, Rfl: 5    lisinopril (PRINIVIL,ZESTRIL) 40 MG tablet, Take 1 tablet by mouth Daily., Disp: , Rfl:     mupirocin (BACTROBAN) 2 % ointment, APPLY TOPICALLY TO THE AFFECTED AREA(S) THREE TIMES DAILY FOR 7 DAYS, Disp: , Rfl:     NIFEdipine CC (ADALAT CC) 30 MG 24 hr tablet, Take 1 tablet by mouth Every Morning., Disp: , Rfl:     NIFEdipine XL (PROCARDIA XL) 30 MG 24 hr tablet, Take 1 tablet by mouth Daily., Disp: , Rfl:     pantoprazole (PROTONIX) 40 MG EC tablet, , Disp: , Rfl:     pravastatin (PRAVACHOL) 80 MG tablet, , Disp: , Rfl: 1    ursodiol (ACTIGALL) 500 MG tablet, , Disp: , Rfl:     Current Facility-Administered Medications:     lidocaine (XYLOCAINE) 1 % injection 5 mL, 5 mL, Infiltration, Once, Svetlana Juárez MD    Pain Medications              acetaminophen (Tylenol 8 Hour) 650 MG 8 hr tablet Take 1 tablet by mouth Every 8 (Eight) Hours As Needed for Mild Pain .    ibuprofen (ADVIL,MOTRIN) 600 MG tablet Take 1 tablet by mouth Every 6 (Six) Hours As Needed for Mild Pain .               ALLERGIES:    Allergies   Allergen Reactions    Amlodipine Swelling         Physical Exam    VITAL SIGNS:  BP (!) 183/101 Comment: LUE  Pulse 58   Temp 97.7 °F (36.5 °C) (Temporal)   Resp 20   Ht 160 cm (63\")   Wt 71.2 kg (157 lb)   SpO2 99% Comment: RA  BMI 27.81 kg/m²     ECOG score: 0           Wt Readings from Last 3 Encounters:   04/21/25 71.2 kg (157 lb)   09/11/24 72.6 kg (160 lb)   05/30/24 72.1 kg (159 lb)       Body mass index is 27.81 kg/m². Body surface area is 1.74 meters squared.    Pain Score    04/21/25 1042   PainSc: 0-No pain           Performance Status: 0    General: well appearing, in no acute distress  HEENT: sclera anicteric, " neck is supple  Lymphatics: no cervical, supraclavicular, or axillary adenopathy  Cardiovascular: regular rate and rhythm, no murmurs, rubs or gallops  Lungs: clear to auscultation bilaterally  Abdomen: soft, nontender, nondistended.  No palpable organomegaly  Extremities: no lower extremity edema  Skin: no rashes, lesions, bruising, or petechiae  Msk:  Shows no weakness of the large muscle groups  Psych: Mood is stable  Breasts:  No new masses or concerning skin changes        RECENT LABS:    Lab Results   Component Value Date    HGB 11.5 05/30/2024    HCT 34.6 05/30/2024    MCV 94 05/30/2024     (L) 05/30/2024    WBC 6.28 05/30/2024    NEUTROABS 4.37 05/30/2024    LYMPHSABS 1.13 (L) 05/30/2024    MONOSABS 0.64 05/30/2024    EOSABS 0.11 05/30/2024    BASOSABS 0.02 05/30/2024       Lab Results   Component Value Date    GLUCOSE 134 (H) 07/27/2022    BUN 21 07/27/2022    CREATININE 0.90 03/20/2024     (L) 07/27/2022    K 4.4 07/27/2022    CL 98 07/27/2022    CO2 23.0 07/27/2022    CALCIUM 9.2 07/27/2022    PROTEINTOT 6.6 07/27/2022    ALBUMIN 3.80 07/27/2022    BILITOT 0.7 07/27/2022    ALKPHOS 55 07/27/2022    AST 17 07/27/2022    ALT 13 07/27/2022     CT Chest With Contrast Diagnostic  Narrative: CT CHEST W CONTRAST DIAGNOSTIC    Date of Exam: 4/21/2025 10:00 AM EDT    Indication: breast cancer.    Comparison: CT chest 9/11/2024    Technique: Axial CT images were obtained of the chest after the uneventful intravenous administration of 75 mL Isovue-300.  Reconstructed coronal and sagittal images were also obtained. Automated exposure control and iterative construction methods were   used.    Findings:  Thyroid unremarkable. No supraclavicular adenopathy. Normal caliber thoracic aorta. Normal caliber main pulmonary artery without large central filling defect to suggest embolism. Severe calcified coronary atherosclerotic disease. Cardiomegaly. No   pericardial effusion. Small sliding hiatal  hernia.    Scattered subcentimeter thoracic lymph nodes stable from prior. No enlarging adenopathy. Trachea patent. Similar-appearing linear bandlike atelectasis versus scarring in the lingula. Similar-appearing consolidation in the left lower lobe possibly   posttreatment related with focal occlusion or severe stenosis of left lower lobe bronchi image 62 series 2. There is more peripheral stable mild peribronchovascular tissue and a tiny left lower lobe nodule image 71 stable from prior. Right-sided areas of   bandlike atelectasis and/or scar stable from prior. No new consolidation, edema, effusion or pneumothorax. No suspicious or increasing pulmonary nodule.    Stable low-density partially exophytic left upper pole renal cyst likely simple/benign. Additional smaller left renal midpole lesion also stable. No acute findings partially imaged upper abdomen. No suspicious hepatic lesion within included   field-of-view. Stable posttreatment related changes in both breasts. No suspicious or enlarging axillary adenopathy or internal mammary chain adenopathy. Osseous demineralization. Degenerative related change throughout the spine. No acute displaced   fracture or aggressive bone lesion.  Impression: Impression:  1. Exam ordered stat, no urgent or emergent CT findings.  2. Essentially stable CT since 9/11/2024 without findings to suggest disease progression.  3. Chronic/ancillary findings as above.    Electronically Signed: Adan Ornelas MD    4/21/2025 10:34 AM EDT    Workstation ID: [id number]     11/20/24 mammogram  IMPRESSION:  Stable presumed postoperative/radiation changes being  followed bilaterally.     RECOMMENDATION:  1. Short interval bilateral mammographic follow-up in 6 months. Imaging  should be performed with tomosynthesis. This will complete the patient's  2-year follow-up status post breast conservation surgery.  2. The patient is a candidate for annual screening breast MRI imaging.  Her last breast MRI  study was performed on 11/10/2023.      Assessment/Plan    1.  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   Completed adjuvant chemotherapy and radiotherapy.  Plans for 6 month follow up diagnostic mammogram May 2025 which will complete her 2 years.  Nov 2024 stable.  Discussed with Dr. Lieberman, no current plans for breast MRI.  Currently on letrozole adjuvantly.  CBE unremarkable.  Reviewed CT chest that showed no recurrence of disease.      2.  Metastatic endometrial cancer.  She had a single focus of disease in her lung s/p CyberKnife.  Plans to continue Femara for now.  She is estrogen receptor positive on the biopsy.  CT chest stable with post radiation changes.  No recurrence of disease.      3.  Pulmonary embolism with thrombus in the left lower pulmonary artery.  Discussed with Dr. Lieberman.  Pt has completed 1 year of Eliquis therapy.  She is to complete her recent prescription and then discontinue.      4.  HTN.  Need to monitor home blood pressures    Follow up in 6 months with repeat scans.      I spent 41 minutes caring for Kenisha.  This includes time spent by me in the following activities:  preparing for visit, reviewing tests, obtaining history, performing physical exam, education, ordering necessary medications/testing and documenting in the medical record.    ANTONIO Shen  [facility] Hematology and On[facility]         No orders of the defined types were placed in this encounter.      4/21/2025

## 2025-05-21 ENCOUNTER — HOSPITAL ENCOUNTER (OUTPATIENT)
Dept: MAMMOGRAPHY | Facility: HOSPITAL | Age: 80
Discharge: HOME | End: 2025-05-21
Payer: MEDICARE

## 2025-05-21 ENCOUNTER — HOSPITAL ENCOUNTER (OUTPATIENT)
Dept: ULTRASOUND IMAGING | Facility: HOSPITAL | Age: 80
Discharge: HOME | End: 2025-05-21
Payer: MEDICARE

## 2025-05-21 ENCOUNTER — TRANSCRIBE ORDERS (OUTPATIENT)
Dept: MAMMOGRAPHY | Facility: HOSPITAL | Age: 80
End: 2025-05-21
Payer: MEDICARE

## 2025-05-21 DIAGNOSIS — R92.8 ABNORMAL MAMMOGRAM: ICD-10-CM

## 2025-05-21 DIAGNOSIS — R92.8 ABNORMAL MAMMOGRAM: Primary | ICD-10-CM

## 2025-05-21 PROCEDURE — 76642 ULTRASOUND BREAST LIMITED: CPT

## 2025-05-21 PROCEDURE — G0279 TOMOSYNTHESIS, MAMMO: HCPCS

## 2025-05-21 PROCEDURE — 77066 DX MAMMO INCL CAD BI: CPT

## 2025-06-04 ENCOUNTER — HOSPITAL ENCOUNTER (OUTPATIENT)
Dept: MAMMOGRAPHY | Facility: HOSPITAL | Age: 80
Discharge: HOME | End: 2025-06-04
Payer: MEDICARE

## 2025-06-04 ENCOUNTER — HOSPITAL ENCOUNTER (OUTPATIENT)
Dept: ULTRASOUND IMAGING | Facility: HOSPITAL | Age: 80
Discharge: HOME | End: 2025-06-04
Payer: MEDICARE

## 2025-06-04 DIAGNOSIS — R92.8 ABNORMAL MAMMOGRAM: ICD-10-CM

## 2025-06-04 PROCEDURE — 25010000002 LIDOCAINE 1 % SOLUTION: Performed by: SURGERY

## 2025-06-04 PROCEDURE — 88305 TISSUE EXAM BY PATHOLOGIST: CPT | Performed by: SURGERY

## 2025-06-04 PROCEDURE — 25010000002 LIDOCAINE 1% - EPINEPHRINE 1:100000 1 %-1:100000 SOLUTION: Performed by: SURGERY

## 2025-06-04 PROCEDURE — A4648 IMPLANTABLE TISSUE MARKER: HCPCS

## 2025-06-04 RX ORDER — LIDOCAINE HYDROCHLORIDE 10 MG/ML
5 INJECTION, SOLUTION INFILTRATION; PERINEURAL ONCE
Status: COMPLETED | OUTPATIENT
Start: 2025-06-04 | End: 2025-06-04

## 2025-06-04 RX ORDER — LIDOCAINE HYDROCHLORIDE AND EPINEPHRINE 10; 10 MG/ML; UG/ML
10 INJECTION, SOLUTION INFILTRATION; PERINEURAL ONCE
Status: COMPLETED | OUTPATIENT
Start: 2025-06-04 | End: 2025-06-04

## 2025-06-04 RX ADMIN — LIDOCAINE HYDROCHLORIDE,EPINEPHRINE BITARTRATE 6 ML: 10; .01 INJECTION, SOLUTION INFILTRATION; PERINEURAL at 15:13

## 2025-06-04 RX ADMIN — Medication 2 ML: at 15:13

## 2025-06-04 NOTE — PROGRESS NOTES
Alert and oriented. Denies discomfort, no active bleeding, steri-strips not visualized, gauze dressing intact.  Cold packs given.  Verbalizes and demonstrates understanding of post-care instructions, written copy given. Questions answered, support given.

## 2025-06-09 ENCOUNTER — TELEPHONE (OUTPATIENT)
Dept: MAMMOGRAPHY | Facility: HOSPITAL | Age: 80
End: 2025-06-09
Payer: MEDICARE

## 2025-06-09 ENCOUNTER — TRANSCRIBE ORDERS (OUTPATIENT)
Dept: ADMINISTRATIVE | Facility: HOSPITAL | Age: 80
End: 2025-06-09
Payer: MEDICARE

## 2025-06-09 DIAGNOSIS — R92.8 ABNORMAL MAMMOGRAM: Primary | ICD-10-CM

## (undated) DEVICE — SUT MNCRYL PLS ANTIB UD 3/0 PS2 27IN

## (undated) DEVICE — [HIGH FLOW INSUFFLATOR,  DO NOT USE IF PACKAGE IS DAMAGED,  KEEP DRY,  KEEP AWAY FROM SUNLIGHT,  PROTECT FROM HEAT AND RADIOACTIVE SOURCES.]: Brand: PNEUMOSURE

## (undated) DEVICE — ADHS SKIN PREMIERPRO EXOFIN TOPICAL HI/VISC .5ML

## (undated) DEVICE — ENDOPATH XCEL BLADELESS TROCARS WITH STABILITY SLEEVES: Brand: ENDOPATH XCEL

## (undated) DEVICE — ENDOCUT SCISSOR TIP, DISPOSABLE: Brand: RENEW

## (undated) DEVICE — TRENDELENBURG WINGPAD POSITIONING KIT DELUXE - WITHOUT BODY STRAP: Brand: SOULE MEDICAL

## (undated) DEVICE — ANTIBACTERIAL UNDYED BRAIDED (POLYGLACTIN 910), SYNTHETIC ABSORBABLE SURGICAL SUTURE: Brand: COATED VICRYL

## (undated) DEVICE — UNDERGLV SURG BIOGEL INDICAT PF 61/2 GRN

## (undated) DEVICE — LAPAROSCOPIC SMOKE FILTRATION SYSTEM: Brand: PALL LAPAROSHIELD® PLUS LAPAROSCOPIC SMOKE FILTRATION SYSTEM

## (undated) DEVICE — ENDOPATH XCEL UNIVERSAL TROCAR STABLILITY SLEEVES: Brand: ENDOPATH XCEL

## (undated) DEVICE — LEX GYN MINOR LAPAROSCOPY: Brand: MEDLINE INDUSTRIES, INC.

## (undated) DEVICE — APPL CHLORAPREP TINTED 26ML TEAL

## (undated) DEVICE — GLV SURG SENSICARE PI MIC PF SZ6 LF STRL